# Patient Record
Sex: FEMALE | Race: BLACK OR AFRICAN AMERICAN | Employment: FULL TIME | URBAN - METROPOLITAN AREA
[De-identification: names, ages, dates, MRNs, and addresses within clinical notes are randomized per-mention and may not be internally consistent; named-entity substitution may affect disease eponyms.]

---

## 2017-02-06 ENCOUNTER — TELEPHONE (OUTPATIENT)
Dept: ENDOCRINOLOGY | Age: 62
End: 2017-02-06

## 2017-02-10 ENCOUNTER — TELEPHONE (OUTPATIENT)
Dept: ENDOCRINOLOGY | Age: 62
End: 2017-02-10

## 2017-02-13 ENCOUNTER — TELEPHONE (OUTPATIENT)
Dept: ENDOCRINOLOGY | Age: 62
End: 2017-02-13

## 2017-02-20 ENCOUNTER — TELEPHONE (OUTPATIENT)
Dept: INTERNAL MEDICINE | Age: 62
End: 2017-02-20

## 2017-02-20 RX ORDER — FLUOXETINE HYDROCHLORIDE 20 MG/1
20 CAPSULE ORAL DAILY
Qty: 90 CAPSULE | Refills: 0 | Status: SHIPPED | OUTPATIENT
Start: 2017-02-20 | End: 2017-08-22 | Stop reason: SDUPTHER

## 2017-02-20 RX ORDER — FLUOXETINE HYDROCHLORIDE 20 MG/1
20 CAPSULE ORAL DAILY
Qty: 90 CAPSULE | Refills: 1 | Status: CANCELLED | OUTPATIENT
Start: 2017-02-20

## 2017-05-15 ENCOUNTER — OFFICE VISIT (OUTPATIENT)
Dept: ENDOCRINOLOGY | Age: 62
End: 2017-05-15

## 2017-05-15 VITALS
HEART RATE: 106 BPM | WEIGHT: 293 LBS | OXYGEN SATURATION: 95 % | DIASTOLIC BLOOD PRESSURE: 83 MMHG | RESPIRATION RATE: 16 BRPM | SYSTOLIC BLOOD PRESSURE: 164 MMHG | HEIGHT: 67 IN | BODY MASS INDEX: 45.99 KG/M2

## 2017-05-15 DIAGNOSIS — I10 ESSENTIAL HYPERTENSION: ICD-10-CM

## 2017-05-15 DIAGNOSIS — E11.8 TYPE 2 DIABETES MELLITUS WITH COMPLICATION, WITH LONG-TERM CURRENT USE OF INSULIN (HCC): Primary | ICD-10-CM

## 2017-05-15 DIAGNOSIS — Z79.4 TYPE 2 DIABETES MELLITUS WITH COMPLICATION, WITH LONG-TERM CURRENT USE OF INSULIN (HCC): Primary | ICD-10-CM

## 2017-05-15 LAB — HBA1C MFR BLD: 10.2 %

## 2017-05-15 PROCEDURE — 99214 OFFICE O/P EST MOD 30 MIN: CPT | Performed by: INTERNAL MEDICINE

## 2017-05-15 PROCEDURE — 83036 HEMOGLOBIN GLYCOSYLATED A1C: CPT | Performed by: INTERNAL MEDICINE

## 2017-05-15 RX ORDER — HYDROCHLOROTHIAZIDE 25 MG/1
25 TABLET ORAL DAILY
Qty: 90 TABLET | Refills: 3 | Status: SHIPPED | OUTPATIENT
Start: 2017-05-15 | End: 2018-03-05

## 2017-05-16 ENCOUNTER — TELEPHONE (OUTPATIENT)
Dept: BARIATRICS/WEIGHT MGMT | Age: 62
End: 2017-05-16

## 2017-05-23 ENCOUNTER — TELEPHONE (OUTPATIENT)
Dept: ENDOCRINOLOGY | Age: 62
End: 2017-05-23

## 2017-05-25 ENCOUNTER — TELEPHONE (OUTPATIENT)
Dept: ENDOCRINOLOGY | Age: 62
End: 2017-05-25

## 2017-05-30 ENCOUNTER — TELEPHONE (OUTPATIENT)
Dept: ENDOCRINOLOGY | Age: 62
End: 2017-05-30

## 2017-06-21 ENCOUNTER — TELEPHONE (OUTPATIENT)
Dept: INTERNAL MEDICINE | Age: 62
End: 2017-06-21

## 2017-06-21 DIAGNOSIS — Z79.4 TYPE 2 DIABETES MELLITUS WITH COMPLICATION, WITH LONG-TERM CURRENT USE OF INSULIN (HCC): ICD-10-CM

## 2017-06-21 DIAGNOSIS — E11.8 TYPE 2 DIABETES MELLITUS WITH COMPLICATION, WITH LONG-TERM CURRENT USE OF INSULIN (HCC): ICD-10-CM

## 2017-06-21 DIAGNOSIS — I10 ESSENTIAL HYPERTENSION: ICD-10-CM

## 2017-06-21 RX ORDER — PROMETHAZINE HYDROCHLORIDE AND CODEINE PHOSPHATE 6.25; 1 MG/5ML; MG/5ML
5 SYRUP ORAL EVERY 4 HOURS PRN
Qty: 240 ML | Refills: 1 | OUTPATIENT
Start: 2017-06-21 | End: 2017-08-22 | Stop reason: ALTCHOICE

## 2017-08-22 ENCOUNTER — OFFICE VISIT (OUTPATIENT)
Dept: ENDOCRINOLOGY | Age: 62
End: 2017-08-22

## 2017-08-22 VITALS
DIASTOLIC BLOOD PRESSURE: 80 MMHG | RESPIRATION RATE: 16 BRPM | WEIGHT: 293 LBS | BODY MASS INDEX: 45.99 KG/M2 | SYSTOLIC BLOOD PRESSURE: 138 MMHG | HEIGHT: 67 IN

## 2017-08-22 DIAGNOSIS — E11.8 TYPE 2 DIABETES MELLITUS WITH COMPLICATION, WITH LONG-TERM CURRENT USE OF INSULIN (HCC): Primary | ICD-10-CM

## 2017-08-22 DIAGNOSIS — E04.2 MULTIPLE THYROID NODULES: ICD-10-CM

## 2017-08-22 DIAGNOSIS — Z79.4 TYPE 2 DIABETES MELLITUS WITH COMPLICATION, WITH LONG-TERM CURRENT USE OF INSULIN (HCC): Primary | ICD-10-CM

## 2017-08-22 DIAGNOSIS — I10 ESSENTIAL HYPERTENSION: ICD-10-CM

## 2017-08-22 LAB — HBA1C MFR BLD: 9.2 %

## 2017-08-22 PROCEDURE — 99214 OFFICE O/P EST MOD 30 MIN: CPT | Performed by: INTERNAL MEDICINE

## 2017-08-22 PROCEDURE — 83036 HEMOGLOBIN GLYCOSYLATED A1C: CPT | Performed by: INTERNAL MEDICINE

## 2017-08-22 RX ORDER — FLUOXETINE HYDROCHLORIDE 20 MG/1
CAPSULE ORAL
Qty: 15 CAPSULE | Refills: 0 | Status: SHIPPED | OUTPATIENT
Start: 2017-08-22 | End: 2017-09-07 | Stop reason: SDUPTHER

## 2017-09-07 ENCOUNTER — OFFICE VISIT (OUTPATIENT)
Dept: INTERNAL MEDICINE | Age: 62
End: 2017-09-07

## 2017-09-07 ENCOUNTER — TELEPHONE (OUTPATIENT)
Dept: INTERNAL MEDICINE | Age: 62
End: 2017-09-07

## 2017-09-07 VITALS
SYSTOLIC BLOOD PRESSURE: 188 MMHG | WEIGHT: 293 LBS | BODY MASS INDEX: 45.99 KG/M2 | DIASTOLIC BLOOD PRESSURE: 87 MMHG | HEIGHT: 67 IN

## 2017-09-07 DIAGNOSIS — R60.1 GENERALIZED EDEMA: ICD-10-CM

## 2017-09-07 DIAGNOSIS — K21.9 GASTROESOPHAGEAL REFLUX DISEASE WITHOUT ESOPHAGITIS: ICD-10-CM

## 2017-09-07 DIAGNOSIS — E11.8 TYPE 2 DIABETES MELLITUS WITH COMPLICATION, WITH LONG-TERM CURRENT USE OF INSULIN (HCC): ICD-10-CM

## 2017-09-07 DIAGNOSIS — G47.33 OBSTRUCTIVE SLEEP APNEA SYNDROME: ICD-10-CM

## 2017-09-07 DIAGNOSIS — I10 ESSENTIAL HYPERTENSION: ICD-10-CM

## 2017-09-07 DIAGNOSIS — E78.00 PURE HYPERCHOLESTEROLEMIA: ICD-10-CM

## 2017-09-07 DIAGNOSIS — E66.01 MORBID OBESITY DUE TO EXCESS CALORIES (HCC): ICD-10-CM

## 2017-09-07 DIAGNOSIS — F32.A DEPRESSION, UNSPECIFIED DEPRESSION TYPE: ICD-10-CM

## 2017-09-07 DIAGNOSIS — Z00.00 WELL ADULT EXAM: Primary | ICD-10-CM

## 2017-09-07 DIAGNOSIS — Z79.4 TYPE 2 DIABETES MELLITUS WITH COMPLICATION, WITH LONG-TERM CURRENT USE OF INSULIN (HCC): ICD-10-CM

## 2017-09-07 DIAGNOSIS — K63.5 POLYP OF COLON, UNSPECIFIED PART OF COLON, UNSPECIFIED TYPE: ICD-10-CM

## 2017-09-07 DIAGNOSIS — F51.01 PRIMARY INSOMNIA: ICD-10-CM

## 2017-09-07 PROCEDURE — 99396 PREV VISIT EST AGE 40-64: CPT | Performed by: INTERNAL MEDICINE

## 2017-09-07 RX ORDER — FLUOXETINE HYDROCHLORIDE 20 MG/1
CAPSULE ORAL
Qty: 90 CAPSULE | Refills: 3 | Status: SHIPPED | OUTPATIENT
Start: 2017-09-07 | End: 2018-09-29 | Stop reason: SDUPTHER

## 2017-09-07 RX ORDER — VERAPAMIL HYDROCHLORIDE 240 MG/1
240 TABLET, FILM COATED, EXTENDED RELEASE ORAL NIGHTLY
Qty: 90 TABLET | Refills: 3 | Status: SHIPPED | OUTPATIENT
Start: 2017-09-07 | End: 2017-12-13 | Stop reason: SDUPTHER

## 2017-09-07 RX ORDER — GABAPENTIN 100 MG/1
100 CAPSULE ORAL 4 TIMES DAILY
Qty: 120 CAPSULE | Refills: 5 | Status: SHIPPED | OUTPATIENT
Start: 2017-09-07 | End: 2019-01-28 | Stop reason: SDUPTHER

## 2017-09-07 ASSESSMENT — ENCOUNTER SYMPTOMS
BACK PAIN: 0
ABDOMINAL PAIN: 0
CHEST TIGHTNESS: 0
WHEEZING: 0
DIARRHEA: 0
COLOR CHANGE: 0

## 2017-09-16 ENCOUNTER — HOSPITAL ENCOUNTER (OUTPATIENT)
Dept: MAMMOGRAPHY | Age: 62
Discharge: HOME | End: 2017-09-16
Attending: OBSTETRICS & GYNECOLOGY | Admitting: OBSTETRICS & GYNECOLOGY

## 2017-09-16 DIAGNOSIS — Z12.31 VISIT FOR SCREENING MAMMOGRAM: ICD-10-CM

## 2017-09-21 DIAGNOSIS — F51.01 PRIMARY INSOMNIA: ICD-10-CM

## 2017-09-21 DIAGNOSIS — E11.8 TYPE 2 DIABETES MELLITUS WITH COMPLICATION, WITH LONG-TERM CURRENT USE OF INSULIN (HCC): ICD-10-CM

## 2017-09-21 DIAGNOSIS — Z79.4 TYPE 2 DIABETES MELLITUS WITH COMPLICATION, WITH LONG-TERM CURRENT USE OF INSULIN (HCC): ICD-10-CM

## 2017-09-21 DIAGNOSIS — E78.00 PURE HYPERCHOLESTEROLEMIA: ICD-10-CM

## 2017-09-21 DIAGNOSIS — I10 ESSENTIAL HYPERTENSION: ICD-10-CM

## 2017-09-21 DIAGNOSIS — Z00.00 WELL ADULT EXAM: ICD-10-CM

## 2017-09-21 LAB
A/G RATIO: 1.1 (ref 1.1–2.2)
ALBUMIN SERPL-MCNC: 3.7 G/DL (ref 3.4–5)
ALP BLD-CCNC: 123 U/L (ref 40–129)
ALT SERPL-CCNC: 11 U/L (ref 10–40)
ANION GAP SERPL CALCULATED.3IONS-SCNC: 15 MMOL/L (ref 3–16)
AST SERPL-CCNC: 8 U/L (ref 15–37)
BILIRUB SERPL-MCNC: 0.4 MG/DL (ref 0–1)
BUN BLDV-MCNC: 10 MG/DL (ref 7–20)
CALCIUM SERPL-MCNC: 9.4 MG/DL (ref 8.3–10.6)
CHLORIDE BLD-SCNC: 98 MMOL/L (ref 99–110)
CHOLESTEROL, TOTAL: 129 MG/DL (ref 0–199)
CO2: 27 MMOL/L (ref 21–32)
CREAT SERPL-MCNC: 0.6 MG/DL (ref 0.6–1.2)
CREATININE URINE: 106.7 MG/DL (ref 28–259)
GFR AFRICAN AMERICAN: >60
GFR NON-AFRICAN AMERICAN: >60
GLOBULIN: 3.4 G/DL
GLUCOSE BLD-MCNC: 208 MG/DL (ref 70–99)
HDLC SERPL-MCNC: 66 MG/DL (ref 40–60)
HEPATITIS C ANTIBODY INTERPRETATION: NORMAL
LDL CHOLESTEROL CALCULATED: 51 MG/DL
MICROALBUMIN UR-MCNC: <1.2 MG/DL
MICROALBUMIN/CREAT UR-RTO: NORMAL MG/G (ref 0–30)
POTASSIUM SERPL-SCNC: 4.3 MMOL/L (ref 3.5–5.1)
SODIUM BLD-SCNC: 140 MMOL/L (ref 136–145)
TOTAL PROTEIN: 7.1 G/DL (ref 6.4–8.2)
TRIGL SERPL-MCNC: 60 MG/DL (ref 0–150)
TSH SERPL DL<=0.05 MIU/L-ACNC: 1.47 UIU/ML (ref 0.27–4.2)
VITAMIN D 25-HYDROXY: 25.7 NG/ML
VLDLC SERPL CALC-MCNC: 12 MG/DL

## 2017-10-23 RX ORDER — INSULIN GLARGINE 100 [IU]/ML
INJECTION, SOLUTION SUBCUTANEOUS
Qty: 135 ML | Refills: 3 | Status: SHIPPED | OUTPATIENT
Start: 2017-10-23 | End: 2018-03-15 | Stop reason: SDUPTHER

## 2017-11-29 ENCOUNTER — NURSE ONLY (OUTPATIENT)
Dept: INTERNAL MEDICINE | Age: 62
End: 2017-11-29

## 2017-11-29 DIAGNOSIS — Z23 NEED FOR INFLUENZA VACCINATION: Primary | ICD-10-CM

## 2017-11-29 PROCEDURE — 90630 INFLUENZA, QUADV, 18-64 YRS, ID, PF, MICRO INJ, 0.1ML (FLUZONE QUADV, PF): CPT | Performed by: INTERNAL MEDICINE

## 2017-11-29 PROCEDURE — 90471 IMMUNIZATION ADMIN: CPT | Performed by: INTERNAL MEDICINE

## 2017-12-12 DIAGNOSIS — E11.8 TYPE 2 DIABETES MELLITUS WITH COMPLICATION, WITH LONG-TERM CURRENT USE OF INSULIN (HCC): ICD-10-CM

## 2017-12-12 DIAGNOSIS — F51.01 PRIMARY INSOMNIA: ICD-10-CM

## 2017-12-12 DIAGNOSIS — K21.9 GASTROESOPHAGEAL REFLUX DISEASE WITHOUT ESOPHAGITIS: ICD-10-CM

## 2017-12-12 DIAGNOSIS — R60.1 GENERALIZED EDEMA: ICD-10-CM

## 2017-12-12 DIAGNOSIS — Z79.4 TYPE 2 DIABETES MELLITUS WITH COMPLICATION, WITH LONG-TERM CURRENT USE OF INSULIN (HCC): ICD-10-CM

## 2017-12-12 DIAGNOSIS — I10 ESSENTIAL HYPERTENSION: ICD-10-CM

## 2017-12-12 DIAGNOSIS — G47.33 OBSTRUCTIVE SLEEP APNEA SYNDROME: ICD-10-CM

## 2017-12-13 RX ORDER — VERAPAMIL HYDROCHLORIDE 240 MG/1
240 TABLET, FILM COATED, EXTENDED RELEASE ORAL NIGHTLY
Qty: 90 TABLET | Refills: 3 | Status: SHIPPED | OUTPATIENT
Start: 2017-12-13 | End: 2018-02-08 | Stop reason: SDUPTHER

## 2017-12-14 RX ORDER — SIMVASTATIN 40 MG
TABLET ORAL
Qty: 90 TABLET | Refills: 4 | Status: SHIPPED | OUTPATIENT
Start: 2017-12-14 | End: 2018-11-12

## 2017-12-14 RX ORDER — LOSARTAN POTASSIUM 100 MG/1
TABLET ORAL
Qty: 90 TABLET | Refills: 4 | Status: SHIPPED | OUTPATIENT
Start: 2017-12-14 | End: 2019-02-04 | Stop reason: SDUPTHER

## 2018-01-19 ENCOUNTER — TELEPHONE (OUTPATIENT)
Dept: INTERNAL MEDICINE | Age: 63
End: 2018-01-19

## 2018-01-29 ENCOUNTER — OFFICE VISIT (OUTPATIENT)
Dept: ENDOCRINOLOGY | Age: 63
End: 2018-01-29

## 2018-01-29 VITALS
RESPIRATION RATE: 16 BRPM | HEART RATE: 68 BPM | SYSTOLIC BLOOD PRESSURE: 138 MMHG | BODY MASS INDEX: 45.99 KG/M2 | WEIGHT: 293 LBS | DIASTOLIC BLOOD PRESSURE: 78 MMHG | HEIGHT: 67 IN

## 2018-01-29 DIAGNOSIS — I10 ESSENTIAL HYPERTENSION: ICD-10-CM

## 2018-01-29 DIAGNOSIS — E11.8 TYPE 2 DIABETES MELLITUS WITH COMPLICATION, WITH LONG-TERM CURRENT USE OF INSULIN (HCC): Primary | ICD-10-CM

## 2018-01-29 DIAGNOSIS — Z79.4 TYPE 2 DIABETES MELLITUS WITH COMPLICATION, WITH LONG-TERM CURRENT USE OF INSULIN (HCC): Primary | ICD-10-CM

## 2018-01-29 LAB — HBA1C MFR BLD: 9.2 %

## 2018-01-29 PROCEDURE — 83036 HEMOGLOBIN GLYCOSYLATED A1C: CPT | Performed by: INTERNAL MEDICINE

## 2018-01-29 PROCEDURE — 99214 OFFICE O/P EST MOD 30 MIN: CPT | Performed by: INTERNAL MEDICINE

## 2018-01-29 RX ORDER — FLASH GLUCOSE SENSOR
KIT MISCELLANEOUS
Qty: 3 EACH | Refills: 2 | Status: SHIPPED | OUTPATIENT
Start: 2018-01-29 | End: 2019-07-22

## 2018-01-29 RX ORDER — FLASH GLUCOSE SENSOR
KIT MISCELLANEOUS
Qty: 1 DEVICE | Refills: 0 | Status: SHIPPED | OUTPATIENT
Start: 2018-01-29 | End: 2019-07-22

## 2018-01-29 NOTE — PROGRESS NOTES
Seen as f/u patient for diabetes    Interim; Seen after 5 months    Diagnosed with Type 2 diabetes mellitus 8 years ago  Known diabetic complications: Retinopathy NPDR    Current diabetic medications   Lantus /66    But missing second shot 3 days a week  Humalog 34 units AC TID, taking it 4/week   SSI 2 for 50>150  She has relative hypoglycemia in the 70s    H/o use of lantus, novolog, victoza, januvia    Metformin and ER caused GI symptoms    Last A1c 9.2%<------10.2 on <--- 8.4 on <-----7.8 on <------ 9.6 on <---- 10.5 on 10/15<-----11.8 on 8/15<---14.2 <--- 10.6 <--- 9.4    Prior visit with dietician: Yes   Current diet: on average, 3 meals per day does not follow low CHO  Current exercise: walking   Current monitoring regimen: home blood tests -occ    Has brought blood glucose log/meter: no  Home blood sugar records: -  Any episodes of hypoglycemia? no    No Hx of CAD , PVD, CVA    Hyperlipidemia:  on 8/15   Simvastatin 40mg  Last eye exam:   Last foot exam:   Last microalbumin to creatinine ratio:  ACE-I or ARB: verapamil  180mg  Losartan 100mg HCTZ 12.5mg    Past Medical History:   Diagnosis Date    Anxiety and depression     Asthma     Edema     GERD (gastroesophageal reflux disease)     Hyperlipidemia     Hypertension     IBS (irritable bowel syndrome)     Morbid obesity ([address]     Multinodular goiter 2010    Personal history of colonic polyps     Dr. Henriquez Dy- due for repeat colonoscopy     Positive PPD, treated     INH completed 2001    S/P cholecystectomy 2011    Type II or unspecified type diabetes mellitus without mention of complication, not stated as uncontrolled      Past Surgical History:   Procedure Laterality Date     SECTION N/A 30 plus years ago    x2    CHOLECYSTECTOMY      3/2009    COLONOSCOPY      2012 Dr. Cam Arreola 5 yrs    COLONOSCOPY      repeat x 3 yrs HAD 5 POLPYS    HYSTEROSCOPY  2015 HYSTEROSCOPY, DILATATION AND CURETTAGE WITH NOVASURE ABLATION AND ABLATION    MYOMECTOMY      6/2003    TIBIA FRACTURE SURGERY      8/2006 w/ revision     Current Outpatient Prescriptions   Medication Sig Dispense Refill    simvastatin (ZOCOR) 40 MG tablet TAKE 1 TABLET NIGHTLY 90 tablet 4    losartan (COZAAR) 100 MG tablet TAKE 1 TABLET DAILY 90 tablet 4    verapamil (CALAN SR) 240 MG extended release tablet Take 1 tablet by mouth nightly 90 tablet 3    LANTUS SOLOSTAR 100 UNIT/ML injection pen INJECT 66 UNITS TWICE A  mL 3    insulin lispro (HUMALOG) 100 UNIT/ML injection vial 34 units with meals 9 vial 3    gabapentin (NEURONTIN) 100 MG capsule Take 1 capsule by mouth 4 times daily 120 capsule 5    FLUoxetine (PROZAC) 20 MG capsule TAKE 1 CAPSULE DAILY 90 capsule 3    hydrochlorothiazide (HYDRODIURIL) 25 MG tablet Take 1 tablet by mouth daily 90 tablet 3    loratadine (CLARITIN) 10 MG tablet Take 10 mg by mouth daily      ONE TOUCH LANCETS MISC 1 each by Does not apply route daily 100 each 3    Insulin Syringe-Needle U-100 (B-D INS SYR MICROFINE .3CC/28G) 28G X 1/2\" 0.3 ML MISC 1 applicator by Does not apply route 2 times daily Dx: 250.00 200 each 6    NORETHINDRONE PO Take 10 mg by mouth nightly       No current facility-administered medications for this visit.         Review of Systems  scanned       Objective:      /78 (Site: Right Arm, Position: Sitting, Cuff Size: Large Adult)   Pulse 68   Resp 16   Ht 5' 6.5\" (1.689 m)   Wt (!) 343 lb 9.6 oz (155.9 kg)   BMI 54.63 kg/m²   Wt Readings from Last 3 Encounters:   01/29/18 (!) 343 lb 9.6 oz (155.9 kg)   09/07/17 (!) 336 lb (152.4 kg)   08/22/17 (!) 334 lb (151.5 kg)     Constitutional: Well-developed, alert, appears stated age, cooperative, in no acute distress  H/E/N/M/T:atraumatic, normocephalic, external ears, nose, lips normal without lesions  Eyes: extraocular muscles are intact  Skin: Xanthoma/Xanthelasmas are  not -Hyperlipidemia, LDL goal is <100 mg/dl   -Hypertension; Continue same  -Daily ASA: Added 81mg   -Smoking status: Non smoker

## 2018-02-08 RX ORDER — VERAPAMIL HYDROCHLORIDE 240 MG/1
240 TABLET, FILM COATED, EXTENDED RELEASE ORAL NIGHTLY
Qty: 90 TABLET | Refills: 3 | Status: SHIPPED | OUTPATIENT
Start: 2018-02-08 | End: 2018-05-21 | Stop reason: SDUPTHER

## 2018-03-02 ENCOUNTER — TELEPHONE (OUTPATIENT)
Dept: ENDOCRINOLOGY | Age: 63
End: 2018-03-02

## 2018-03-05 ENCOUNTER — OFFICE VISIT (OUTPATIENT)
Dept: INTERNAL MEDICINE | Age: 63
End: 2018-03-05

## 2018-03-05 DIAGNOSIS — M54.50 CHRONIC BILATERAL LOW BACK PAIN WITHOUT SCIATICA: ICD-10-CM

## 2018-03-05 DIAGNOSIS — M25.561 CHRONIC PAIN OF BOTH KNEES: ICD-10-CM

## 2018-03-05 DIAGNOSIS — Z79.4 TYPE 2 DIABETES MELLITUS WITH COMPLICATION, WITH LONG-TERM CURRENT USE OF INSULIN (HCC): ICD-10-CM

## 2018-03-05 DIAGNOSIS — I87.2 VENOUS STASIS DERMATITIS OF BOTH LOWER EXTREMITIES: ICD-10-CM

## 2018-03-05 DIAGNOSIS — G56.03 BILATERAL CARPAL TUNNEL SYNDROME: Primary | ICD-10-CM

## 2018-03-05 DIAGNOSIS — E11.8 TYPE 2 DIABETES MELLITUS WITH COMPLICATION, WITH LONG-TERM CURRENT USE OF INSULIN (HCC): ICD-10-CM

## 2018-03-05 DIAGNOSIS — M25.572 CHRONIC PAIN OF BOTH ANKLES: ICD-10-CM

## 2018-03-05 DIAGNOSIS — G89.29 CHRONIC PAIN OF BOTH ANKLES: ICD-10-CM

## 2018-03-05 DIAGNOSIS — R35.0 URINARY FREQUENCY: ICD-10-CM

## 2018-03-05 DIAGNOSIS — M19.90 ARTHRITIS: ICD-10-CM

## 2018-03-05 DIAGNOSIS — G89.29 CHRONIC PAIN OF BOTH KNEES: ICD-10-CM

## 2018-03-05 DIAGNOSIS — I10 ESSENTIAL HYPERTENSION: ICD-10-CM

## 2018-03-05 DIAGNOSIS — M25.571 CHRONIC PAIN OF BOTH ANKLES: ICD-10-CM

## 2018-03-05 DIAGNOSIS — G89.29 CHRONIC BILATERAL LOW BACK PAIN WITHOUT SCIATICA: ICD-10-CM

## 2018-03-05 DIAGNOSIS — M25.562 CHRONIC PAIN OF BOTH KNEES: ICD-10-CM

## 2018-03-05 PROCEDURE — 99214 OFFICE O/P EST MOD 30 MIN: CPT | Performed by: INTERNAL MEDICINE

## 2018-03-05 RX ORDER — BETAMETHASONE DIPROPIONATE 0.05 %
OINTMENT (GRAM) TOPICAL
Qty: 1 TUBE | Refills: 3 | Status: SHIPPED | OUTPATIENT
Start: 2018-03-05 | End: 2018-05-21 | Stop reason: SDUPTHER

## 2018-03-05 RX ORDER — FUROSEMIDE 20 MG/1
20 TABLET ORAL DAILY
Qty: 30 TABLET | Refills: 3 | Status: SHIPPED | OUTPATIENT
Start: 2018-03-05 | End: 2018-05-21 | Stop reason: SDUPTHER

## 2018-03-05 RX ORDER — OXYBUTYNIN CHLORIDE 10 MG/1
10 TABLET, EXTENDED RELEASE ORAL DAILY
Qty: 30 TABLET | Refills: 3 | Status: SHIPPED | OUTPATIENT
Start: 2018-03-05 | End: 2018-09-29

## 2018-03-05 ASSESSMENT — ENCOUNTER SYMPTOMS
BACK PAIN: 1
CHEST TIGHTNESS: 0
WHEEZING: 0
ABDOMINAL PAIN: 0
COLOR CHANGE: 1

## 2018-03-05 ASSESSMENT — PATIENT HEALTH QUESTIONNAIRE - PHQ9
2. FEELING DOWN, DEPRESSED OR HOPELESS: 0
SUM OF ALL RESPONSES TO PHQ QUESTIONS 1-9: 0
1. LITTLE INTEREST OR PLEASURE IN DOING THINGS: 0
SUM OF ALL RESPONSES TO PHQ9 QUESTIONS 1 & 2: 0

## 2018-03-08 ENCOUNTER — TELEPHONE (OUTPATIENT)
Dept: INTERNAL MEDICINE | Age: 63
End: 2018-03-08

## 2018-03-19 ENCOUNTER — TELEPHONE (OUTPATIENT)
Dept: ENDOCRINOLOGY | Age: 63
End: 2018-03-19

## 2018-03-19 NOTE — TELEPHONE ENCOUNTER
Pt called to have her BD Sterile Needles Microfine . 3cc/28G for her Lantus Pen called to [address] please.

## 2018-03-22 ENCOUNTER — HOSPITAL ENCOUNTER (OUTPATIENT)
Dept: PHYSICAL THERAPY | Age: 63
Discharge: HOME | End: 2018-03-31
Attending: INTERNAL MEDICINE | Admitting: INTERNAL MEDICINE

## 2018-04-01 ENCOUNTER — HOSPITAL ENCOUNTER (OUTPATIENT)
Dept: OTHER | Age: 63
Discharge: HOME | End: 2018-04-30
Attending: INTERNAL MEDICINE | Admitting: INTERNAL MEDICINE

## 2018-04-18 ENCOUNTER — TELEPHONE (OUTPATIENT)
Dept: ORTHOPEDIC SURGERY | Age: 63
End: 2018-04-18

## 2018-04-18 ENCOUNTER — OFFICE VISIT (OUTPATIENT)
Dept: ORTHOPEDIC SURGERY | Age: 63
End: 2018-04-18

## 2018-04-18 VITALS — HEIGHT: 67 IN | WEIGHT: 293 LBS | BODY MASS INDEX: 45.99 KG/M2 | RESPIRATION RATE: 16 BRPM

## 2018-04-18 DIAGNOSIS — R20.0 BILATERAL HAND NUMBNESS: Primary | ICD-10-CM

## 2018-04-18 PROCEDURE — 99243 OFF/OP CNSLTJ NEW/EST LOW 30: CPT | Performed by: ORTHOPAEDIC SURGERY

## 2018-05-01 ENCOUNTER — HOSPITAL ENCOUNTER (OUTPATIENT)
Dept: OTHER | Age: 63
Discharge: HOME | End: 2018-05-31
Attending: INTERNAL MEDICINE | Admitting: INTERNAL MEDICINE

## 2018-05-21 ENCOUNTER — OFFICE VISIT (OUTPATIENT)
Dept: INTERNAL MEDICINE | Age: 63
End: 2018-05-21

## 2018-05-21 VITALS
SYSTOLIC BLOOD PRESSURE: 120 MMHG | HEIGHT: 66 IN | BODY MASS INDEX: 47.09 KG/M2 | WEIGHT: 293 LBS | DIASTOLIC BLOOD PRESSURE: 76 MMHG

## 2018-05-21 DIAGNOSIS — M25.572 CHRONIC PAIN OF BOTH ANKLES: ICD-10-CM

## 2018-05-21 DIAGNOSIS — M54.50 CHRONIC BILATERAL LOW BACK PAIN WITHOUT SCIATICA: ICD-10-CM

## 2018-05-21 DIAGNOSIS — Z79.4 TYPE 2 DIABETES MELLITUS WITH COMPLICATION, WITH LONG-TERM CURRENT USE OF INSULIN (HCC): ICD-10-CM

## 2018-05-21 DIAGNOSIS — M25.571 CHRONIC PAIN OF BOTH ANKLES: ICD-10-CM

## 2018-05-21 DIAGNOSIS — M25.561 CHRONIC PAIN OF BOTH KNEES: ICD-10-CM

## 2018-05-21 DIAGNOSIS — G89.29 CHRONIC BILATERAL LOW BACK PAIN WITHOUT SCIATICA: ICD-10-CM

## 2018-05-21 DIAGNOSIS — E11.8 TYPE 2 DIABETES MELLITUS WITH COMPLICATION, WITH LONG-TERM CURRENT USE OF INSULIN (HCC): ICD-10-CM

## 2018-05-21 DIAGNOSIS — M25.562 CHRONIC PAIN OF BOTH KNEES: ICD-10-CM

## 2018-05-21 DIAGNOSIS — G89.29 CHRONIC PAIN OF BOTH ANKLES: ICD-10-CM

## 2018-05-21 DIAGNOSIS — R60.1 GENERALIZED EDEMA: ICD-10-CM

## 2018-05-21 DIAGNOSIS — I87.2 VENOUS STASIS DERMATITIS OF BOTH LOWER EXTREMITIES: ICD-10-CM

## 2018-05-21 DIAGNOSIS — I10 ESSENTIAL HYPERTENSION: ICD-10-CM

## 2018-05-21 DIAGNOSIS — G89.29 CHRONIC PAIN OF BOTH KNEES: ICD-10-CM

## 2018-05-21 PROCEDURE — 99214 OFFICE O/P EST MOD 30 MIN: CPT | Performed by: INTERNAL MEDICINE

## 2018-05-21 RX ORDER — FUROSEMIDE 40 MG/1
40 TABLET ORAL DAILY
Qty: 10 TABLET | Refills: 3 | Status: SHIPPED | OUTPATIENT
Start: 2018-05-21 | End: 2018-09-29

## 2018-05-21 RX ORDER — POTASSIUM CHLORIDE 750 MG/1
10 TABLET, EXTENDED RELEASE ORAL DAILY
Qty: 10 TABLET | Refills: 3 | Status: SHIPPED | OUTPATIENT
Start: 2018-05-21 | End: 2018-09-29

## 2018-05-21 RX ORDER — BETAMETHASONE DIPROPIONATE 0.05 %
OINTMENT (GRAM) TOPICAL
Qty: 3 TUBE | Refills: 3 | Status: SHIPPED | OUTPATIENT
Start: 2018-05-21 | End: 2018-05-21 | Stop reason: SDUPTHER

## 2018-05-21 RX ORDER — FUROSEMIDE 40 MG/1
40 TABLET ORAL DAILY
Qty: 90 TABLET | Refills: 3 | Status: SHIPPED | OUTPATIENT
Start: 2018-05-21 | End: 2018-05-21 | Stop reason: SDUPTHER

## 2018-05-21 RX ORDER — BETAMETHASONE DIPROPIONATE 0.05 %
OINTMENT (GRAM) TOPICAL
Qty: 1 TUBE | Refills: 3 | Status: SHIPPED | OUTPATIENT
Start: 2018-05-21 | End: 2018-09-29 | Stop reason: ALTCHOICE

## 2018-05-21 RX ORDER — POTASSIUM CHLORIDE 750 MG/1
10 TABLET, EXTENDED RELEASE ORAL DAILY
Qty: 90 TABLET | Refills: 3 | Status: SHIPPED | OUTPATIENT
Start: 2018-05-21 | End: 2018-05-21 | Stop reason: SDUPTHER

## 2018-05-21 RX ORDER — VERAPAMIL HYDROCHLORIDE 240 MG/1
240 TABLET, FILM COATED, EXTENDED RELEASE ORAL NIGHTLY
Qty: 90 TABLET | Refills: 3 | Status: SHIPPED | OUTPATIENT
Start: 2018-05-21 | End: 2019-07-11 | Stop reason: SDUPTHER

## 2018-05-21 ASSESSMENT — ENCOUNTER SYMPTOMS
WHEEZING: 0
CHEST TIGHTNESS: 0
BACK PAIN: 0
ABDOMINAL PAIN: 0
COLOR CHANGE: 1

## 2018-06-19 DIAGNOSIS — M25.572 CHRONIC PAIN OF BOTH ANKLES: ICD-10-CM

## 2018-06-19 DIAGNOSIS — M25.571 CHRONIC PAIN OF BOTH ANKLES: ICD-10-CM

## 2018-06-19 DIAGNOSIS — M25.562 CHRONIC PAIN OF BOTH KNEES: ICD-10-CM

## 2018-06-19 DIAGNOSIS — I87.2 VENOUS STASIS DERMATITIS OF BOTH LOWER EXTREMITIES: ICD-10-CM

## 2018-06-19 DIAGNOSIS — M25.561 CHRONIC PAIN OF BOTH KNEES: ICD-10-CM

## 2018-06-19 DIAGNOSIS — E11.8 TYPE 2 DIABETES MELLITUS WITH COMPLICATION, WITH LONG-TERM CURRENT USE OF INSULIN (HCC): ICD-10-CM

## 2018-06-19 DIAGNOSIS — Z79.4 TYPE 2 DIABETES MELLITUS WITH COMPLICATION, WITH LONG-TERM CURRENT USE OF INSULIN (HCC): ICD-10-CM

## 2018-06-19 DIAGNOSIS — G89.29 CHRONIC PAIN OF BOTH KNEES: ICD-10-CM

## 2018-06-19 DIAGNOSIS — I10 ESSENTIAL HYPERTENSION: ICD-10-CM

## 2018-06-19 DIAGNOSIS — G89.29 CHRONIC PAIN OF BOTH ANKLES: ICD-10-CM

## 2018-06-20 LAB
ALBUMIN SERPL-MCNC: 3.9 G/DL (ref 3.4–5)
ANION GAP SERPL CALCULATED.3IONS-SCNC: 16 MMOL/L (ref 3–16)
BASOPHILS ABSOLUTE: 0 K/UL (ref 0–0.2)
BASOPHILS RELATIVE PERCENT: 0.2 %
BUN BLDV-MCNC: 12 MG/DL (ref 7–20)
CALCIUM SERPL-MCNC: 9.5 MG/DL (ref 8.3–10.6)
CHLORIDE BLD-SCNC: 97 MMOL/L (ref 99–110)
CO2: 28 MMOL/L (ref 21–32)
CREAT SERPL-MCNC: 0.7 MG/DL (ref 0.6–1.2)
EOSINOPHILS ABSOLUTE: 0.1 K/UL (ref 0–0.6)
EOSINOPHILS RELATIVE PERCENT: 1.1 %
GFR AFRICAN AMERICAN: >60
GFR NON-AFRICAN AMERICAN: >60
GLUCOSE BLD-MCNC: 194 MG/DL (ref 70–99)
HCT VFR BLD CALC: 40.3 % (ref 36–48)
HEMOGLOBIN: 13.4 G/DL (ref 12–16)
LYMPHOCYTES ABSOLUTE: 3.2 K/UL (ref 1–5.1)
LYMPHOCYTES RELATIVE PERCENT: 32.1 %
MCH RBC QN AUTO: 29.1 PG (ref 26–34)
MCHC RBC AUTO-ENTMCNC: 33.3 G/DL (ref 31–36)
MCV RBC AUTO: 87.4 FL (ref 80–100)
MONOCYTES ABSOLUTE: 0.8 K/UL (ref 0–1.3)
MONOCYTES RELATIVE PERCENT: 7.9 %
NEUTROPHILS ABSOLUTE: 5.9 K/UL (ref 1.7–7.7)
NEUTROPHILS RELATIVE PERCENT: 58.7 %
PDW BLD-RTO: 15.5 % (ref 12.4–15.4)
PHOSPHORUS: 3.8 MG/DL (ref 2.5–4.9)
PLATELET # BLD: 227 K/UL (ref 135–450)
PMV BLD AUTO: 11.5 FL (ref 5–10.5)
POTASSIUM SERPL-SCNC: 4.3 MMOL/L (ref 3.5–5.1)
RBC # BLD: 4.61 M/UL (ref 4–5.2)
SODIUM BLD-SCNC: 141 MMOL/L (ref 136–145)
WBC # BLD: 10.1 K/UL (ref 4–11)

## 2018-07-26 ENCOUNTER — OFFICE VISIT (OUTPATIENT)
Dept: INTERNAL MEDICINE | Age: 63
End: 2018-07-26

## 2018-07-26 VITALS
SYSTOLIC BLOOD PRESSURE: 161 MMHG | HEIGHT: 67 IN | WEIGHT: 293 LBS | BODY MASS INDEX: 45.99 KG/M2 | DIASTOLIC BLOOD PRESSURE: 64 MMHG

## 2018-07-26 DIAGNOSIS — M25.561 CHRONIC PAIN OF BOTH KNEES: ICD-10-CM

## 2018-07-26 DIAGNOSIS — G89.29 CHRONIC PAIN OF BOTH KNEES: ICD-10-CM

## 2018-07-26 DIAGNOSIS — Z79.4 TYPE 2 DIABETES MELLITUS WITH COMPLICATION, WITH LONG-TERM CURRENT USE OF INSULIN (HCC): ICD-10-CM

## 2018-07-26 DIAGNOSIS — E11.8 TYPE 2 DIABETES MELLITUS WITH COMPLICATION, WITH LONG-TERM CURRENT USE OF INSULIN (HCC): ICD-10-CM

## 2018-07-26 DIAGNOSIS — M72.2 PLANTAR FASCIITIS: ICD-10-CM

## 2018-07-26 DIAGNOSIS — M25.562 CHRONIC PAIN OF BOTH KNEES: ICD-10-CM

## 2018-07-26 DIAGNOSIS — I10 ESSENTIAL HYPERTENSION: ICD-10-CM

## 2018-07-26 PROCEDURE — 99214 OFFICE O/P EST MOD 30 MIN: CPT | Performed by: INTERNAL MEDICINE

## 2018-07-26 ASSESSMENT — ENCOUNTER SYMPTOMS
COLOR CHANGE: 0
BACK PAIN: 0
WHEEZING: 0
CHEST TIGHTNESS: 0
ABDOMINAL PAIN: 0

## 2018-07-26 NOTE — PROGRESS NOTES
Subjective:      Patient ID: Lili Pat is a 65 y.o. female. Chief Complaint   Patient presents with    Foot Pain     bilat feet pain ,rercurring (PLANTAR FASCITIS)     HAVING SEVERE susie heel pain- has had plantar fasciitis in the past- bp's sl up at home but in 140 range- utd w Dr. Ofelia Metzger still ooc- no progress w wt loss-conts w lbp and susie knee pain    Review of Systems   Constitutional: Positive for fatigue. Negative for activity change and appetite change. HENT: Negative for congestion. Eyes: Negative for visual disturbance. Respiratory: Negative for chest tightness and wheezing. Cardiovascular: Negative for chest pain and palpitations. Gastrointestinal: Negative for abdominal pain. Musculoskeletal: Positive for arthralgias and gait problem. Negative for back pain. Skin: Negative for color change and rash. Neurological: Negative for weakness, light-headedness and headaches. Psychiatric/Behavioral: Positive for sleep disturbance. Objective:   Physical Exam   Constitutional: She is oriented to person, place, and time. She appears well-developed and well-nourished. Morbidly obese female   HENT:   Head: Normocephalic and atraumatic. Eyes: Conjunctivae and EOM are normal. Pupils are equal, round, and reactive to light. Neck: Normal range of motion. Neck supple. Cardiovascular: Normal rate, regular rhythm and normal heart sounds. No carotid bruit auscultated bilaterally   Pulmonary/Chest: Effort normal and breath sounds normal. No respiratory distress. Abdominal: She exhibits no distension. There is no tenderness. Musculoskeletal: She exhibits tenderness. Pt tenderness over plantar fascia susie l>r  susie knee pain   Lymphadenopathy:     She has no cervical adenopathy. Neurological: She is alert and oriented to person, place, and time. Skin: Skin is warm and dry. Psychiatric: She has a normal mood and affect.  Her behavior is normal. Judgment and thought content normal.         Assessment and Plan:      Plantar fasciitis  See orders for patient and pt also given complete instructions re: course of therapy      Hypertension  Sl up- monitor and call if staying above 130/80    Obese  Discussed with patient at length health risks of obesity and need for diet and exercise      Diabetes mellitus ([facility] [address]  OOC- cont to work w Dr. Jeny Ceja    Chronic pain of both knees  Try to work on wt loss and leg strengthening       Encounter Diagnoses   Name Primary?  Plantar fasciitis     Essential hypertension     Class 3 obesity due to excess calories with serious comorbidity and body mass index (BMI) of 40.0 to 44.9 in adult [facility])     Type 2 diabetes mellitus with complication, with long-term current use of insulin (HCC)     Chronic pain of both knees        No orders of the defined types were placed in this encounter.

## 2018-07-31 ENCOUNTER — TELEPHONE (OUTPATIENT)
Dept: INTERNAL MEDICINE | Age: 63
End: 2018-07-31

## 2018-07-31 ENCOUNTER — OFFICE VISIT (OUTPATIENT)
Dept: DERMATOLOGY | Age: 63
End: 2018-07-31

## 2018-07-31 DIAGNOSIS — I83.10 LIPODERMATOSCLEROSIS: Primary | ICD-10-CM

## 2018-07-31 DIAGNOSIS — R60.0 LOWER EXTREMITY EDEMA: ICD-10-CM

## 2018-07-31 DIAGNOSIS — Z87.891 FORMER SMOKER: ICD-10-CM

## 2018-07-31 DIAGNOSIS — L85.3 XEROSIS CUTIS: ICD-10-CM

## 2018-07-31 PROCEDURE — 99242 OFF/OP CONSLTJ NEW/EST SF 20: CPT | Performed by: DERMATOLOGY

## 2018-07-31 RX ORDER — CIPROFLOXACIN 500 MG/1
500 TABLET, FILM COATED ORAL 2 TIMES DAILY
Qty: 14 TABLET | Refills: 0 | Status: SHIPPED | OUTPATIENT
Start: 2018-07-31 | End: 2018-08-07

## 2018-07-31 NOTE — PROGRESS NOTES
Lipodermatosclerosis    2. Lower extremity edema    3. Xerosis cutis    4. Former smoker        1. Lipodermatosclerosis  No ulcerations present, very subtle erythema noted, overall stable condition  -Counseled patient about condition and how chronic vascular compromise due to DM and chronic episodes leg edema can lead to thickening and discoloration of the skin in dependent areas. Treatment topically can help to soften plaques with goal of tx to prevent erosions and ulcers in skin but often much of the change is irreversible and possible to experience flares due to underlying etiology     -Patient counseled to use a gentle cleanser such as Dove or Cetaphil daily, do not take more than 1 shower daily with warm water and spend less than 10 minutes in shower/bath, pat dry and then apply thick moisturizer like OTC CeraVe cream in jar liberally at least twice daily. Avoid fragrances and dyes and use only fragrance free detergents. -Apply topical corticosteroid as prescribed below to affected areas on lower extremities     -Referral sent to vascular surgery for help with underlying leg edema and fitting for compression stockings    Apply betamethasone ointment to hardened/firm areas only BID for 2 weeks. -Edu re: sparing use, atrophy, striae, hypopigmentation, telangiectasias. (PCP gave Rx prior, patient has plenty left)    RTC 4 weeks    2. Lower extremity edema  -Referral sent to vascular surgery for help with underlying leg edema, evaluate extent of peripheral vascular disease, and fitting for compression stockings  -Elevate legs above the level of the heart when reclined at rest.  -Encourage compliance with Lasix as prescribed by PCP, patient does not take as directed due to increased frequency of urination and difficulty with occupation. Follow-up with PCP as needed  - Ro Islas MD    3.  Xerosis cutis  -Patient counseled to use a gentle cleanser such as Cetaphil daily, do not take more than 1

## 2018-08-06 ENCOUNTER — TELEPHONE (OUTPATIENT)
Dept: VASCULAR SURGERY | Age: 63
End: 2018-08-06

## 2018-08-24 DIAGNOSIS — Z79.4 TYPE 2 DIABETES MELLITUS WITH COMPLICATION, WITH LONG-TERM CURRENT USE OF INSULIN (HCC): Primary | ICD-10-CM

## 2018-08-24 DIAGNOSIS — E11.8 TYPE 2 DIABETES MELLITUS WITH COMPLICATION, WITH LONG-TERM CURRENT USE OF INSULIN (HCC): Primary | ICD-10-CM

## 2018-08-28 ENCOUNTER — OFFICE VISIT (OUTPATIENT)
Dept: VASCULAR SURGERY | Age: 63
End: 2018-08-28

## 2018-08-28 VITALS
WEIGHT: 293 LBS | TEMPERATURE: 98.1 F | BODY MASS INDEX: 47.09 KG/M2 | SYSTOLIC BLOOD PRESSURE: 183 MMHG | HEIGHT: 66 IN | DIASTOLIC BLOOD PRESSURE: 85 MMHG

## 2018-08-28 DIAGNOSIS — I87.2 VENOUS STASIS DERMATITIS OF BOTH LOWER EXTREMITIES: ICD-10-CM

## 2018-08-28 DIAGNOSIS — E66.01 CLASS 3 SEVERE OBESITY DUE TO EXCESS CALORIES WITH SERIOUS COMORBIDITY AND BODY MASS INDEX (BMI) OF 40.0 TO 44.9 IN ADULT (HCC): Primary | ICD-10-CM

## 2018-08-28 PROCEDURE — 29580 STRAPPING UNNA BOOT: CPT | Performed by: SURGERY

## 2018-08-28 PROCEDURE — 99203 OFFICE O/P NEW LOW 30 MIN: CPT | Performed by: SURGERY

## 2018-08-28 NOTE — PROGRESS NOTES
Number of children: N/A    Years of education: N/A     Occupational History    Not on file. Social History Main Topics    Smoking status: Former Smoker     Packs/day: 0.50     Years: 10.00     Types: Cigarettes     Quit date: 5/22/1995    Smokeless tobacco: Never Used    Alcohol use Yes      Comment: once a month    Drug use: No    Sexual activity: Not on file     Other Topics Concern    Not on file     Social History Narrative    No narrative on file       Family History:  History reviewed. No pertinent family history. Review of Systems:  ROS  all pertinent items are noted in the HPI    Physical Examination:    Vitals:    08/28/18 1436   BP: (!) 183/85   Temp: 98.1 °F (36.7 °C)   Weight: (!) 344 lb (156 kg)   Height: 5' 6\" (1.676 m)     Body mass index is 55.52 kg/m². Physical Exam   Constitutional: She is oriented to person, place, and time. She appears well-developed and well-nourished. No distress. Morbidly obese, very pleasant and conversive. Neck: Normal range of motion. Neck supple. No JVD present. Cardiovascular: Normal rate and regular rhythm. Exam reveals no gallop and no friction rub. No murmur heard. Pulmonary/Chest: Effort normal and breath sounds normal. No respiratory distress. She has no wheezes. She has no rales. Abdominal: Soft. Bowel sounds are normal. She exhibits no distension. There is no tenderness. Neurological: She is alert and oriented to person, place, and time. Skin: Skin is warm and dry. Psychiatric: She has a normal mood and affect. Her behavior is normal. Judgment and thought content normal.   Extremities:  Bilateral lower sugars are warm and well-perfused with 2+ chronic, nonpitting edema of the bilateral lower legs and ankles. .  The feet do not have swelling. She has 2+ palpable dorsalis pedis and posterior tibial pulse. She has mild to moderate erythematous discoloration of the lower legs consistent with venous insufficiency. No cellulitis. No weeping or wound breakdown. Diagnosis:    1. Chronic venous insufficiency with bilateral lower extremity swelling  2. Morbid obesity    Plan: We had a long discussion regarding compression and how this is the mainstay of her treatment. She currently has CircAid; however, she is not applying these correctly. She does not have the measuring device which is necessary for donning these appropriately. We have placed her in bilateral Unna boots today and arrangements have been made for Mindy from [facility] [place] to meet with her next week at which time her inability to be removed and she will be appropriately measured for compression. We will also try to get her a measuring device for the current CircAid's that she has.

## 2018-09-04 ENCOUNTER — OFFICE VISIT (OUTPATIENT)
Dept: VASCULAR SURGERY | Age: 63
End: 2018-09-04

## 2018-09-04 VITALS
BODY MASS INDEX: 45.99 KG/M2 | HEIGHT: 67 IN | DIASTOLIC BLOOD PRESSURE: 81 MMHG | HEART RATE: 111 BPM | SYSTOLIC BLOOD PRESSURE: 145 MMHG | TEMPERATURE: 98.5 F | WEIGHT: 293 LBS

## 2018-09-04 DIAGNOSIS — M79.89 LEG SWELLING: Primary | ICD-10-CM

## 2018-09-04 PROCEDURE — NSCHGE NO SHOW CHARGE: Performed by: SURGERY

## 2018-09-04 PROCEDURE — 99212 OFFICE O/P EST SF 10 MIN: CPT | Performed by: SURGERY

## 2018-09-04 NOTE — PROGRESS NOTES
Patient here for stocking fitting. Cosme boots removed. Katie Ratel with [facility] in and states patient has good stockings, but had been applying them wrong. Per Katie Solares, patient instructed on how to place compression stocking correctly and verbalized understanding.

## 2018-09-18 LAB — PAP SMEAR: NORMAL

## 2018-09-19 ENCOUNTER — TELEPHONE (OUTPATIENT)
Dept: INTERNAL MEDICINE | Age: 63
End: 2018-09-19

## 2018-09-19 RX ORDER — HYDROCHLOROTHIAZIDE 12.5 MG/1
12.5 CAPSULE, GELATIN COATED ORAL DAILY
Qty: 30 CAPSULE | Refills: 3 | Status: SHIPPED | OUTPATIENT
Start: 2018-09-19 | End: 2018-11-12 | Stop reason: SDUPTHER

## 2018-09-19 NOTE — TELEPHONE ENCOUNTER
Prescription Question     From  Zoya Ramos To  [facility] [place] [facility] Practice Support Sent  9/19/2018  4:12 PM   Hello! I am having side effects from the Furesmide(water pill).  I am having severe diarrhea. I have discontinued taking the medication at this time. But of course, I am having inflammation or edema in my legs.  What should I do? Thank you!

## 2018-09-24 ENCOUNTER — HOSPITAL ENCOUNTER (OUTPATIENT)
Dept: MAMMOGRAPHY | Age: 63
Discharge: HOME | End: 2018-09-24
Payer: COMMERCIAL

## 2018-09-24 DIAGNOSIS — Z12.31 VISIT FOR SCREENING MAMMOGRAM: ICD-10-CM

## 2018-09-24 PROCEDURE — 77067 SCR MAMMO BI INCL CAD: CPT

## 2018-09-29 ENCOUNTER — OFFICE VISIT (OUTPATIENT)
Dept: INTERNAL MEDICINE CLINIC | Age: 63
End: 2018-09-29
Payer: COMMERCIAL

## 2018-09-29 VITALS
BODY MASS INDEX: 45.99 KG/M2 | SYSTOLIC BLOOD PRESSURE: 140 MMHG | DIASTOLIC BLOOD PRESSURE: 80 MMHG | WEIGHT: 293 LBS | HEIGHT: 67 IN

## 2018-09-29 DIAGNOSIS — K21.9 GASTROESOPHAGEAL REFLUX DISEASE WITHOUT ESOPHAGITIS: ICD-10-CM

## 2018-09-29 DIAGNOSIS — Z23 NEED FOR INFLUENZA VACCINATION: ICD-10-CM

## 2018-09-29 DIAGNOSIS — F32.A DEPRESSION, UNSPECIFIED DEPRESSION TYPE: ICD-10-CM

## 2018-09-29 DIAGNOSIS — Z11.4 ENCOUNTER FOR SCREENING FOR HIV: ICD-10-CM

## 2018-09-29 DIAGNOSIS — Z79.4 TYPE 2 DIABETES MELLITUS WITH COMPLICATION, WITH LONG-TERM CURRENT USE OF INSULIN (HCC): ICD-10-CM

## 2018-09-29 DIAGNOSIS — I10 ESSENTIAL HYPERTENSION: ICD-10-CM

## 2018-09-29 DIAGNOSIS — E66.01 CLASS 3 SEVERE OBESITY DUE TO EXCESS CALORIES WITH SERIOUS COMORBIDITY AND BODY MASS INDEX (BMI) OF 40.0 TO 44.9 IN ADULT (HCC): ICD-10-CM

## 2018-09-29 DIAGNOSIS — R32 URINARY INCONTINENCE, UNSPECIFIED TYPE: ICD-10-CM

## 2018-09-29 DIAGNOSIS — Z00.00 WELL ADULT EXAM: Primary | ICD-10-CM

## 2018-09-29 DIAGNOSIS — Z23 NEED FOR PNEUMOCOCCAL VACCINATION: ICD-10-CM

## 2018-09-29 DIAGNOSIS — F41.9 ANXIETY: ICD-10-CM

## 2018-09-29 DIAGNOSIS — E11.8 TYPE 2 DIABETES MELLITUS WITH COMPLICATION, WITH LONG-TERM CURRENT USE OF INSULIN (HCC): ICD-10-CM

## 2018-09-29 PROCEDURE — 90472 IMMUNIZATION ADMIN EACH ADD: CPT | Performed by: INTERNAL MEDICINE

## 2018-09-29 PROCEDURE — 90732 PPSV23 VACC 2 YRS+ SUBQ/IM: CPT | Performed by: INTERNAL MEDICINE

## 2018-09-29 PROCEDURE — 90686 IIV4 VACC NO PRSV 0.5 ML IM: CPT | Performed by: INTERNAL MEDICINE

## 2018-09-29 PROCEDURE — 99396 PREV VISIT EST AGE 40-64: CPT | Performed by: INTERNAL MEDICINE

## 2018-09-29 PROCEDURE — 90471 IMMUNIZATION ADMIN: CPT | Performed by: INTERNAL MEDICINE

## 2018-09-29 RX ORDER — FLUOXETINE HYDROCHLORIDE 40 MG/1
CAPSULE ORAL
Qty: 90 CAPSULE | Refills: 3 | Status: SHIPPED | OUTPATIENT
Start: 2018-09-29 | End: 2018-11-12 | Stop reason: SDUPTHER

## 2018-09-29 RX ORDER — BUSPIRONE HYDROCHLORIDE 15 MG/1
15 TABLET ORAL 3 TIMES DAILY PRN
Qty: 60 TABLET | Refills: 3 | Status: SHIPPED | OUTPATIENT
Start: 2018-09-29 | End: 2020-01-07 | Stop reason: SDUPTHER

## 2018-09-29 ASSESSMENT — ENCOUNTER SYMPTOMS
BACK PAIN: 0
ABDOMINAL PAIN: 0
CHEST TIGHTNESS: 0
COLOR CHANGE: 0
WHEEZING: 0

## 2018-09-29 NOTE — PATIENT INSTRUCTIONS
Restart your 3 times a day humolog!!! Weight watchers is probably the most effective diet plan out there!!   But if you would rather just count calories that is very effective also! Try  myplate. com or OmniStrat OR the "[employer] talia to track your calories    try to get under [id number] kcal/day    increase exercise gradually to 45-50 mins 4 times weekly   There are a lot of exercise routines you can watch and DO on the internet- one is the [place] 7 minute workout! DO NOT SKIP MEALS- preferably eat frequent small healthy snacks all day long  If you'd like to try a more regimented diet here are a few I recommend  The Lenice Lingo Method is very helpful-check it out on line   ? ?  Whole 30 diet  The Metabolism Plan- Janny-Roxi Recitas    Increase prozak to 40 mg daily- I sent rx for the 40mg to the pharmacy  Try the buspar for anxiety- start w 1/2 tab but you can take up to 2 2-3 times daily

## 2018-09-29 NOTE — PROGRESS NOTES
loratadine (CLARITIN) 10 MG tablet Take 10 mg by mouth daily      NORETHINDRONE PO Take 10 mg by mouth nightly      ONE TOUCH LANCETS MISC 1 each by Does not apply route daily 100 each 3     No current facility-administered medications for this visit. Vitals:    18 1113 18 1123   BP: (!) 160/90 (!) 140/80   Site: Left Upper Arm Left Upper Arm   Position: Sitting Sitting   Cuff Size: Large Adult Large Adult   Weight: (!) 347 lb 3.2 oz (157.5 kg)    Height: 5' 7\" (1.702 m)      Body mass index is 54.38 kg/m².      Wt Readings from Last 3 Encounters:   18 (!) 347 lb 3.2 oz (157.5 kg)   18 (!) 344 lb (156 kg)   18 (!) 344 lb (156 kg)     BP Readings from Last 3 Encounters:   18 (!) 140/80   18 (!) 145/81   18 (!) 183/85         Immunization History   Administered Date(s) Administered    Influenza Virus Vaccine 2015    Influenza, Intradermal, Quadrivalent, Preservative Free 2016, 2017    Influenza, Tyler Méndez, 3 yrs and older, IM, PF (Fluzone 3 yrs and older or Afluria 5 yrs and older) 2018    Pneumococcal 13-valent Conjugate ([id number]) 2015    Pneumococcal Conjugate 7-valent 2007    Pneumococcal Polysaccharide ([id number]) 2018    Tdap (Boostrix, Adacel) 2011    Tetanus 1999       Past Medical History:   Diagnosis Date    Anxiety and depression     Asthma     Edema     GERD (gastroesophageal reflux disease)     Hyperlipidemia     Hypertension     IBS (irritable bowel syndrome)     Morbid obesity ([address]     Multinodular goiter 2010    Personal history of colonic polyps     Dr. Toby Mazariegos- due for repeat colonoscopy     Positive PPD, treated     INH completed 2001    S/P cholecystectomy 2011    Type II or unspecified type diabetes mellitus without mention of complication, not stated as uncontrolled      Past Surgical History:   Procedure Laterality Date     SECTION N/A 30 plus

## 2018-10-05 ENCOUNTER — TELEPHONE (OUTPATIENT)
Dept: INTERNAL MEDICINE CLINIC | Age: 63
End: 2018-10-05

## 2018-10-05 RX ORDER — MOMETASONE FUROATE 1 MG/G
OINTMENT TOPICAL
Qty: 1 TUBE | Refills: 1 | Status: SHIPPED | OUTPATIENT
Start: 2018-10-05 | End: 2019-05-24 | Stop reason: ALTCHOICE

## 2018-11-12 ENCOUNTER — OFFICE VISIT (OUTPATIENT)
Dept: INTERNAL MEDICINE CLINIC | Age: 63
End: 2018-11-12
Payer: COMMERCIAL

## 2018-11-12 VITALS
SYSTOLIC BLOOD PRESSURE: 151 MMHG | WEIGHT: 293 LBS | HEIGHT: 67 IN | DIASTOLIC BLOOD PRESSURE: 76 MMHG | BODY MASS INDEX: 45.99 KG/M2

## 2018-11-12 DIAGNOSIS — Z79.4 TYPE 2 DIABETES MELLITUS WITH COMPLICATION, WITH LONG-TERM CURRENT USE OF INSULIN (HCC): ICD-10-CM

## 2018-11-12 DIAGNOSIS — E78.00 PURE HYPERCHOLESTEROLEMIA: ICD-10-CM

## 2018-11-12 DIAGNOSIS — E66.01 CLASS 3 SEVERE OBESITY DUE TO EXCESS CALORIES WITH SERIOUS COMORBIDITY AND BODY MASS INDEX (BMI) OF 40.0 TO 44.9 IN ADULT (HCC): ICD-10-CM

## 2018-11-12 DIAGNOSIS — E11.8 TYPE 2 DIABETES MELLITUS WITH COMPLICATION, WITH LONG-TERM CURRENT USE OF INSULIN (HCC): ICD-10-CM

## 2018-11-12 DIAGNOSIS — I10 ESSENTIAL HYPERTENSION: ICD-10-CM

## 2018-11-12 DIAGNOSIS — I87.2 VENOUS STASIS DERMATITIS OF BOTH LOWER EXTREMITIES: ICD-10-CM

## 2018-11-12 DIAGNOSIS — F32.A DEPRESSION, UNSPECIFIED DEPRESSION TYPE: ICD-10-CM

## 2018-11-12 PROCEDURE — 99214 OFFICE O/P EST MOD 30 MIN: CPT | Performed by: INTERNAL MEDICINE

## 2018-11-12 RX ORDER — MONTELUKAST SODIUM 10 MG/1
10 TABLET ORAL NIGHTLY
Qty: 90 TABLET | Refills: 3 | Status: SHIPPED | OUTPATIENT
Start: 2018-11-12 | End: 2019-01-15

## 2018-11-12 RX ORDER — ATORVASTATIN CALCIUM 40 MG/1
40 TABLET, FILM COATED ORAL DAILY
Qty: 90 TABLET | Refills: 1 | Status: SHIPPED | OUTPATIENT
Start: 2018-11-12 | End: 2020-01-02

## 2018-11-12 RX ORDER — FLUOXETINE HYDROCHLORIDE 40 MG/1
CAPSULE ORAL
Qty: 90 CAPSULE | Refills: 3 | Status: SHIPPED | OUTPATIENT
Start: 2018-11-12 | End: 2020-01-02

## 2018-11-12 RX ORDER — HYDROCHLOROTHIAZIDE 12.5 MG/1
12.5 CAPSULE, GELATIN COATED ORAL DAILY
Qty: 90 CAPSULE | Refills: 3 | Status: SHIPPED | OUTPATIENT
Start: 2018-11-12 | End: 2019-12-13

## 2018-11-12 NOTE — PATIENT INSTRUCTIONS
Add flonase daily  Or twice daily  If that isn't doing it for the allergies- add singulair- fill printed rx   Check bp 3-4 times a week preferably when you get home from work and other days in am after sitting for 5 mins but no caffeine,smoking or exercise for 30 mins prior-  and in a chair w a back and both feet down-recheck in another 5 mins  Goal is under 130/80  If its not coming down in the next few weeks call me   When you get the new [facility] Stop simvastatin and change to lipitor ([facility])

## 2018-11-15 ASSESSMENT — ENCOUNTER SYMPTOMS
ABDOMINAL PAIN: 0
BACK PAIN: 0
COLOR CHANGE: 0
CHEST TIGHTNESS: 0
WHEEZING: 0

## 2018-11-26 ENCOUNTER — TELEPHONE (OUTPATIENT)
Dept: INTERNAL MEDICINE CLINIC | Age: 63
End: 2018-11-26

## 2019-01-07 ENCOUNTER — OFFICE VISIT (OUTPATIENT)
Dept: INTERNAL MEDICINE CLINIC | Age: 64
End: 2019-01-07
Payer: COMMERCIAL

## 2019-01-07 VITALS
BODY MASS INDEX: 45.99 KG/M2 | DIASTOLIC BLOOD PRESSURE: 80 MMHG | TEMPERATURE: 99 F | WEIGHT: 293 LBS | SYSTOLIC BLOOD PRESSURE: 142 MMHG | HEIGHT: 67 IN

## 2019-01-07 DIAGNOSIS — F32.A DEPRESSION, UNSPECIFIED DEPRESSION TYPE: ICD-10-CM

## 2019-01-07 DIAGNOSIS — M54.50 CHRONIC BILATERAL LOW BACK PAIN WITHOUT SCIATICA: ICD-10-CM

## 2019-01-07 DIAGNOSIS — E66.01 CLASS 3 SEVERE OBESITY DUE TO EXCESS CALORIES WITH SERIOUS COMORBIDITY AND BODY MASS INDEX (BMI) OF 40.0 TO 44.9 IN ADULT (HCC): ICD-10-CM

## 2019-01-07 DIAGNOSIS — J06.9 VIRAL URI: ICD-10-CM

## 2019-01-07 DIAGNOSIS — J02.9 SORE THROAT: Primary | ICD-10-CM

## 2019-01-07 DIAGNOSIS — I10 ESSENTIAL HYPERTENSION: ICD-10-CM

## 2019-01-07 DIAGNOSIS — G47.33 OBSTRUCTIVE SLEEP APNEA SYNDROME: ICD-10-CM

## 2019-01-07 DIAGNOSIS — E11.8 TYPE 2 DIABETES MELLITUS WITH COMPLICATION, WITH LONG-TERM CURRENT USE OF INSULIN (HCC): ICD-10-CM

## 2019-01-07 DIAGNOSIS — Z79.4 TYPE 2 DIABETES MELLITUS WITH COMPLICATION, WITH LONG-TERM CURRENT USE OF INSULIN (HCC): ICD-10-CM

## 2019-01-07 DIAGNOSIS — G89.29 CHRONIC BILATERAL LOW BACK PAIN WITHOUT SCIATICA: ICD-10-CM

## 2019-01-07 LAB — S PYO AG THROAT QL: NORMAL

## 2019-01-07 PROCEDURE — 99214 OFFICE O/P EST MOD 30 MIN: CPT | Performed by: INTERNAL MEDICINE

## 2019-01-07 PROCEDURE — 87880 STREP A ASSAY W/OPTIC: CPT | Performed by: INTERNAL MEDICINE

## 2019-01-07 RX ORDER — PROMETHAZINE HYDROCHLORIDE AND CODEINE PHOSPHATE 6.25; 1 MG/5ML; MG/5ML
5 SYRUP ORAL EVERY 4 HOURS PRN
Qty: 240 ML | Refills: 1 | Status: SHIPPED | OUTPATIENT
Start: 2019-01-07 | End: 2019-05-24 | Stop reason: ALTCHOICE

## 2019-01-07 RX ORDER — BENZONATATE 200 MG/1
200 CAPSULE ORAL 3 TIMES DAILY PRN
Qty: 30 CAPSULE | Refills: 3 | Status: SHIPPED | OUTPATIENT
Start: 2019-01-07 | End: 2019-01-14

## 2019-01-07 ASSESSMENT — PATIENT HEALTH QUESTIONNAIRE - PHQ9
SUM OF ALL RESPONSES TO PHQ QUESTIONS 1-9: 2
SUM OF ALL RESPONSES TO PHQ QUESTIONS 1-9: 2
SUM OF ALL RESPONSES TO PHQ9 QUESTIONS 1 & 2: 2
1. LITTLE INTEREST OR PLEASURE IN DOING THINGS: 1
2. FEELING DOWN, DEPRESSED OR HOPELESS: 1

## 2019-01-07 ASSESSMENT — ENCOUNTER SYMPTOMS
COUGH: 0
WHEEZING: 0
SINUS PRESSURE: 1
RHINORRHEA: 1
ABDOMINAL PAIN: 0

## 2019-01-15 ENCOUNTER — OFFICE VISIT (OUTPATIENT)
Dept: ENDOCRINOLOGY | Age: 64
End: 2019-01-15
Payer: COMMERCIAL

## 2019-01-15 VITALS
RESPIRATION RATE: 16 BRPM | BODY MASS INDEX: 45.99 KG/M2 | SYSTOLIC BLOOD PRESSURE: 180 MMHG | WEIGHT: 293 LBS | DIASTOLIC BLOOD PRESSURE: 90 MMHG | OXYGEN SATURATION: 97 % | HEIGHT: 67 IN | HEART RATE: 87 BPM

## 2019-01-15 DIAGNOSIS — E04.2 MULTIPLE THYROID NODULES: ICD-10-CM

## 2019-01-15 DIAGNOSIS — I10 ESSENTIAL HYPERTENSION: ICD-10-CM

## 2019-01-15 DIAGNOSIS — F41.9 ANXIETY: ICD-10-CM

## 2019-01-15 DIAGNOSIS — Z11.4 ENCOUNTER FOR SCREENING FOR HIV: ICD-10-CM

## 2019-01-15 DIAGNOSIS — K21.9 GASTROESOPHAGEAL REFLUX DISEASE WITHOUT ESOPHAGITIS: ICD-10-CM

## 2019-01-15 DIAGNOSIS — E11.8 TYPE 2 DIABETES MELLITUS WITH COMPLICATION, WITH LONG-TERM CURRENT USE OF INSULIN (HCC): ICD-10-CM

## 2019-01-15 DIAGNOSIS — Z79.4 TYPE 2 DIABETES MELLITUS WITH COMPLICATION, WITH LONG-TERM CURRENT USE OF INSULIN (HCC): Primary | ICD-10-CM

## 2019-01-15 DIAGNOSIS — Z00.00 WELL ADULT EXAM: ICD-10-CM

## 2019-01-15 DIAGNOSIS — E78.49 OTHER HYPERLIPIDEMIA: ICD-10-CM

## 2019-01-15 DIAGNOSIS — Z79.4 TYPE 2 DIABETES MELLITUS WITH COMPLICATION, WITH LONG-TERM CURRENT USE OF INSULIN (HCC): ICD-10-CM

## 2019-01-15 DIAGNOSIS — F32.A DEPRESSION, UNSPECIFIED DEPRESSION TYPE: ICD-10-CM

## 2019-01-15 DIAGNOSIS — E11.8 TYPE 2 DIABETES MELLITUS WITH COMPLICATION, WITH LONG-TERM CURRENT USE OF INSULIN (HCC): Primary | ICD-10-CM

## 2019-01-15 LAB
A/G RATIO: 1.3 (ref 1.1–2.2)
ALBUMIN SERPL-MCNC: 4.3 G/DL (ref 3.4–5)
ALP BLD-CCNC: 148 U/L (ref 40–129)
ALT SERPL-CCNC: 14 U/L (ref 10–40)
ANION GAP SERPL CALCULATED.3IONS-SCNC: 16 MMOL/L (ref 3–16)
AST SERPL-CCNC: 12 U/L (ref 15–37)
BASOPHILS ABSOLUTE: 0 K/UL (ref 0–0.2)
BASOPHILS RELATIVE PERCENT: 0.4 %
BILIRUB SERPL-MCNC: 0.3 MG/DL (ref 0–1)
BUN BLDV-MCNC: 8 MG/DL (ref 7–20)
CALCIUM SERPL-MCNC: 9.7 MG/DL (ref 8.3–10.6)
CHLORIDE BLD-SCNC: 98 MMOL/L (ref 99–110)
CHOLESTEROL, TOTAL: 161 MG/DL (ref 0–199)
CO2: 29 MMOL/L (ref 21–32)
CREAT SERPL-MCNC: 0.7 MG/DL (ref 0.6–1.2)
CREATININE URINE: 155 MG/DL (ref 28–259)
EOSINOPHILS ABSOLUTE: 0.1 K/UL (ref 0–0.6)
EOSINOPHILS RELATIVE PERCENT: 1 %
GFR AFRICAN AMERICAN: >60
GFR NON-AFRICAN AMERICAN: >60
GLOBULIN: 3.4 G/DL
GLUCOSE BLD-MCNC: 127 MG/DL (ref 70–99)
HBA1C MFR BLD: 9.9 %
HCT VFR BLD CALC: 43.9 % (ref 36–48)
HDLC SERPL-MCNC: 63 MG/DL (ref 40–60)
HEMOGLOBIN: 14.5 G/DL (ref 12–16)
LDL CHOLESTEROL CALCULATED: 83 MG/DL
LYMPHOCYTES ABSOLUTE: 3.7 K/UL (ref 1–5.1)
LYMPHOCYTES RELATIVE PERCENT: 35.9 %
MCH RBC QN AUTO: 29.5 PG (ref 26–34)
MCHC RBC AUTO-ENTMCNC: 33 G/DL (ref 31–36)
MCV RBC AUTO: 89.5 FL (ref 80–100)
MICROALBUMIN UR-MCNC: 2.2 MG/DL
MICROALBUMIN/CREAT UR-RTO: 14.2 MG/G (ref 0–30)
MONOCYTES ABSOLUTE: 0.6 K/UL (ref 0–1.3)
MONOCYTES RELATIVE PERCENT: 5.8 %
NEUTROPHILS ABSOLUTE: 5.9 K/UL (ref 1.7–7.7)
NEUTROPHILS RELATIVE PERCENT: 56.9 %
PDW BLD-RTO: 14 % (ref 12.4–15.4)
PLATELET # BLD: 228 K/UL (ref 135–450)
PMV BLD AUTO: 11.6 FL (ref 5–10.5)
POTASSIUM SERPL-SCNC: 4.1 MMOL/L (ref 3.5–5.1)
RBC # BLD: 4.91 M/UL (ref 4–5.2)
SODIUM BLD-SCNC: 143 MMOL/L (ref 136–145)
TOTAL PROTEIN: 7.7 G/DL (ref 6.4–8.2)
TRIGL SERPL-MCNC: 75 MG/DL (ref 0–150)
TSH SERPL DL<=0.05 MIU/L-ACNC: 1.35 UIU/ML (ref 0.27–4.2)
VITAMIN D 25-HYDROXY: 24.3 NG/ML
VLDLC SERPL CALC-MCNC: 15 MG/DL
WBC # BLD: 10.3 K/UL (ref 4–11)

## 2019-01-15 PROCEDURE — 83036 HEMOGLOBIN GLYCOSYLATED A1C: CPT | Performed by: INTERNAL MEDICINE

## 2019-01-15 PROCEDURE — 99215 OFFICE O/P EST HI 40 MIN: CPT | Performed by: INTERNAL MEDICINE

## 2019-01-16 LAB
HIV AG/AB: NORMAL
HIV ANTIGEN: NORMAL
HIV-1 ANTIBODY: NORMAL
HIV-2 AB: NORMAL

## 2019-01-28 DIAGNOSIS — F51.01 PRIMARY INSOMNIA: ICD-10-CM

## 2019-01-28 DIAGNOSIS — I10 ESSENTIAL HYPERTENSION: ICD-10-CM

## 2019-01-28 DIAGNOSIS — Z79.4 TYPE 2 DIABETES MELLITUS WITH COMPLICATION, WITH LONG-TERM CURRENT USE OF INSULIN (HCC): ICD-10-CM

## 2019-01-28 DIAGNOSIS — E78.00 PURE HYPERCHOLESTEROLEMIA: ICD-10-CM

## 2019-01-28 DIAGNOSIS — Z00.00 WELL ADULT EXAM: ICD-10-CM

## 2019-01-28 DIAGNOSIS — E11.8 TYPE 2 DIABETES MELLITUS WITH COMPLICATION, WITH LONG-TERM CURRENT USE OF INSULIN (HCC): ICD-10-CM

## 2019-01-28 RX ORDER — MONTELUKAST SODIUM 10 MG/1
10 TABLET ORAL NIGHTLY
Qty: 90 TABLET | Refills: 3 | Status: SHIPPED | OUTPATIENT
Start: 2019-01-28 | End: 2021-05-10 | Stop reason: ALTCHOICE

## 2019-01-28 RX ORDER — GABAPENTIN 100 MG/1
100 CAPSULE ORAL 4 TIMES DAILY
Qty: 120 CAPSULE | Refills: 5 | Status: SHIPPED | OUTPATIENT
Start: 2019-01-28 | End: 2019-05-24 | Stop reason: ALTCHOICE

## 2019-02-04 DIAGNOSIS — K21.9 GASTROESOPHAGEAL REFLUX DISEASE WITHOUT ESOPHAGITIS: ICD-10-CM

## 2019-02-04 DIAGNOSIS — E11.8 TYPE 2 DIABETES MELLITUS WITH COMPLICATION, WITH LONG-TERM CURRENT USE OF INSULIN (HCC): ICD-10-CM

## 2019-02-04 DIAGNOSIS — G47.33 OBSTRUCTIVE SLEEP APNEA SYNDROME: ICD-10-CM

## 2019-02-04 DIAGNOSIS — I10 ESSENTIAL HYPERTENSION: ICD-10-CM

## 2019-02-04 DIAGNOSIS — F51.01 PRIMARY INSOMNIA: ICD-10-CM

## 2019-02-04 DIAGNOSIS — R60.1 GENERALIZED EDEMA: ICD-10-CM

## 2019-02-04 DIAGNOSIS — Z79.4 TYPE 2 DIABETES MELLITUS WITH COMPLICATION, WITH LONG-TERM CURRENT USE OF INSULIN (HCC): ICD-10-CM

## 2019-02-04 RX ORDER — LOSARTAN POTASSIUM 100 MG/1
TABLET ORAL
Qty: 90 TABLET | Refills: 4 | Status: SHIPPED | OUTPATIENT
Start: 2019-02-04 | End: 2021-05-10

## 2019-04-17 ENCOUNTER — OFFICE VISIT (OUTPATIENT)
Dept: INTERNAL MEDICINE CLINIC | Age: 64
End: 2019-04-17
Payer: COMMERCIAL

## 2019-04-17 ENCOUNTER — HOSPITAL ENCOUNTER (OUTPATIENT)
Age: 64
Discharge: HOME | End: 2019-04-17
Payer: COMMERCIAL

## 2019-04-17 ENCOUNTER — HOSPITAL ENCOUNTER (OUTPATIENT)
Dept: GENERAL RADIOLOGY | Age: 64
Discharge: HOME | End: 2019-04-17
Payer: COMMERCIAL

## 2019-04-17 VITALS
WEIGHT: 293 LBS | BODY MASS INDEX: 45.99 KG/M2 | HEIGHT: 67 IN | DIASTOLIC BLOOD PRESSURE: 68 MMHG | SYSTOLIC BLOOD PRESSURE: 158 MMHG

## 2019-04-17 DIAGNOSIS — S89.91XA INJURY OF RIGHT KNEE, INITIAL ENCOUNTER: Primary | ICD-10-CM

## 2019-04-17 DIAGNOSIS — E66.01 CLASS 3 SEVERE OBESITY DUE TO EXCESS CALORIES WITH SERIOUS COMORBIDITY AND BODY MASS INDEX (BMI) OF 40.0 TO 44.9 IN ADULT (HCC): ICD-10-CM

## 2019-04-17 DIAGNOSIS — I10 ESSENTIAL HYPERTENSION: ICD-10-CM

## 2019-04-17 DIAGNOSIS — E11.8 TYPE 2 DIABETES MELLITUS WITH COMPLICATION, WITH LONG-TERM CURRENT USE OF INSULIN (HCC): ICD-10-CM

## 2019-04-17 DIAGNOSIS — S89.91XA INJURY OF RIGHT KNEE, INITIAL ENCOUNTER: ICD-10-CM

## 2019-04-17 DIAGNOSIS — Z79.4 TYPE 2 DIABETES MELLITUS WITH COMPLICATION, WITH LONG-TERM CURRENT USE OF INSULIN (HCC): ICD-10-CM

## 2019-04-17 DIAGNOSIS — F32.A DEPRESSION, UNSPECIFIED DEPRESSION TYPE: ICD-10-CM

## 2019-04-17 PROCEDURE — 99214 OFFICE O/P EST MOD 30 MIN: CPT | Performed by: INTERNAL MEDICINE

## 2019-04-17 PROCEDURE — 73562 X-RAY EXAM OF KNEE 3: CPT

## 2019-04-17 RX ORDER — MELOXICAM 15 MG/1
15 TABLET ORAL DAILY
Qty: 30 TABLET | Refills: 1 | Status: SHIPPED | OUTPATIENT
Start: 2019-04-17 | End: 2019-05-24 | Stop reason: ALTCHOICE

## 2019-04-17 ASSESSMENT — ENCOUNTER SYMPTOMS
BACK PAIN: 0
COLOR CHANGE: 0
SHORTNESS OF BREATH: 0
CHEST TIGHTNESS: 0
WHEEZING: 0
ABDOMINAL PAIN: 0

## 2019-04-17 ASSESSMENT — PATIENT HEALTH QUESTIONNAIRE - PHQ9
SUM OF ALL RESPONSES TO PHQ QUESTIONS 1-9: 0
SUM OF ALL RESPONSES TO PHQ9 QUESTIONS 1 & 2: 0
1. LITTLE INTEREST OR PLEASURE IN DOING THINGS: 0
SUM OF ALL RESPONSES TO PHQ QUESTIONS 1-9: 0
2. FEELING DOWN, DEPRESSED OR HOPELESS: 0

## 2019-04-17 NOTE — PROGRESS NOTES
Subjective:      Patient ID: Everton Hammond is a 59 y.o. female. Chief Complaint   Patient presents with    Fall     x 4 days trauma to left knee fell in [place] of town   fell on right knee while hiking 4 days ago- swelled and was red- has not given out on her but w certain movements has a lot of pain- no progress w weight loss- bp's well controlled- has not been to Dr. Feliciano Matson and sugars not well controlled -conts w severe susie lower leg edema no worse since injury -stills struggles w some depression      Review of Systems   Constitutional: Negative for activity change, appetite change and fatigue. HENT: Negative for congestion. Eyes: Negative for visual disturbance. Respiratory: Negative for chest tightness, shortness of breath and wheezing. Cardiovascular: Negative for chest pain and palpitations. Gastrointestinal: Negative for abdominal pain. Musculoskeletal: Positive for arthralgias, gait problem and myalgias. Negative for back pain. Skin: Negative for color change and rash. Neurological: Negative for weakness, light-headedness and headaches. Psychiatric/Behavioral: Positive for dysphoric mood. Negative for sleep disturbance. The patient is not nervous/anxious. Objective:   Physical Exam   Constitutional: She is oriented to person, place, and time. She appears well-developed and well-nourished. HENT:   Head: Normocephalic and atraumatic. Eyes: Pupils are equal, round, and reactive to light. Conjunctivae and EOM are normal.   Neck: Normal range of motion. Neck supple. Cardiovascular: Normal rate, regular rhythm and normal heart sounds. Pulmonary/Chest: Effort normal and breath sounds normal. No respiratory distress. She has no wheezes. Abdominal: She exhibits no distension. There is no tenderness. Musculoskeletal: She exhibits tenderness.    Decreased rom in knee without effusion,redness or warmth noted  ++ pain w rom      Lymphadenopathy:     She has no cervical

## 2019-04-30 ENCOUNTER — OFFICE VISIT (OUTPATIENT)
Dept: ORTHOPEDIC SURGERY | Age: 64
End: 2019-04-30
Payer: COMMERCIAL

## 2019-04-30 VITALS — BODY MASS INDEX: 45.99 KG/M2 | HEIGHT: 67 IN | WEIGHT: 293 LBS

## 2019-04-30 DIAGNOSIS — M17.11 PRIMARY OSTEOARTHRITIS OF RIGHT KNEE: ICD-10-CM

## 2019-04-30 DIAGNOSIS — S83.281A ACUTE LATERAL MENISCUS TEAR OF RIGHT KNEE, INITIAL ENCOUNTER: ICD-10-CM

## 2019-04-30 DIAGNOSIS — M25.561 RIGHT KNEE PAIN, UNSPECIFIED CHRONICITY: Primary | ICD-10-CM

## 2019-04-30 DIAGNOSIS — S83.241A ACUTE MEDIAL MENISCUS TEAR OF RIGHT KNEE, INITIAL ENCOUNTER: ICD-10-CM

## 2019-04-30 PROCEDURE — 99203 OFFICE O/P NEW LOW 30 MIN: CPT | Performed by: ORTHOPAEDIC SURGERY
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
## 2019-05-03 ENCOUNTER — HOSPITAL ENCOUNTER (OUTPATIENT)
Dept: MRI IMAGING | Age: 64
Discharge: HOME | End: 2019-05-03
Payer: COMMERCIAL

## 2019-05-03 ENCOUNTER — TELEPHONE (OUTPATIENT)
Dept: ORTHOPEDIC SURGERY | Age: 64
End: 2019-05-03

## 2019-05-03 DIAGNOSIS — M17.11 PRIMARY OSTEOARTHRITIS OF RIGHT KNEE: ICD-10-CM

## 2019-05-03 DIAGNOSIS — M25.561 RIGHT KNEE PAIN, UNSPECIFIED CHRONICITY: ICD-10-CM

## 2019-05-03 DIAGNOSIS — S83.281A ACUTE LATERAL MENISCUS TEAR OF RIGHT KNEE, INITIAL ENCOUNTER: ICD-10-CM

## 2019-05-03 DIAGNOSIS — S83.241A ACUTE MEDIAL MENISCUS TEAR OF RIGHT KNEE, INITIAL ENCOUNTER: ICD-10-CM

## 2019-05-03 DIAGNOSIS — S83.281A ACUTE LATERAL MENISCUS TEAR OF RIGHT KNEE, INITIAL ENCOUNTER: Primary | ICD-10-CM

## 2019-05-03 NOTE — TELEPHONE ENCOUNTER
Patient called because she was not able to proceed with her MRI because of claustrophobia. She wants to proceed now @ [facility]. I will arrange and call her back.      I spoke with scheduling and she now is scheduled at [facility] with IV sedation on May 9 @ 8:30am.

## 2019-05-10 DIAGNOSIS — M25.561 RIGHT KNEE PAIN, UNSPECIFIED CHRONICITY: Primary | ICD-10-CM

## 2019-05-15 ENCOUNTER — TELEPHONE (OUTPATIENT)
Dept: ORTHOPEDIC SURGERY | Age: 64
End: 2019-05-15

## 2019-05-15 NOTE — LETTER
[facility]  [address] [place] [address]  Phone: [phone]  Fax: [address] [place] [address]        May 15, 2019     Patient: Tamiko Barlow   YOB: 1955   Date of Visit: 5/15/2019       To Whom It May Concern: It is my medical opinion that Severiano Abe may return to limited participation immediately with the following restrictions of limited standing, bending and walking due to the nature of her painful knee. If you have any questions or concerns, please don't hesitate to call. Sincerely,      Malka Olszewski. Rosina Pérez M.D., F.A.C.S.

## 2019-05-20 ENCOUNTER — TELEPHONE (OUTPATIENT)
Dept: ENDOCRINOLOGY | Age: 64
End: 2019-05-20

## 2019-05-20 NOTE — TELEPHONE ENCOUNTER
Spoke to patient and advised per prev note, she said that she just took it again a little bit ago and is was down to 232 and she is starting to feel better. She said that she cant go to urgent care because there is no one to take her but if her BS start to go up again she will find a way to go.  Advised patient that she can always call 911 if she has to go

## 2019-05-20 NOTE — TELEPHONE ENCOUNTER
Patients sugar was 400 this morning after breakfast and took insulin and rechecked and it was 352. Is there anything else she can do besides insulin or another insulin that will help.   She said she has been sick lately too and she has never had it his high before       Call patient on her cell  [phone]

## 2019-05-20 NOTE — TELEPHONE ENCOUNTER
If she is feeling sick , that can cause her high glucose. She needs to go to urgent care for evaluation.   Also needs to schedule to be seen for f/u as LOV 1/19

## 2019-05-21 ENCOUNTER — OFFICE VISIT (OUTPATIENT)
Dept: INTERNAL MEDICINE CLINIC | Age: 64
End: 2019-05-21
Payer: COMMERCIAL

## 2019-05-21 VITALS
HEIGHT: 66 IN | BODY MASS INDEX: 47.09 KG/M2 | DIASTOLIC BLOOD PRESSURE: 90 MMHG | TEMPERATURE: 98.8 F | WEIGHT: 293 LBS | SYSTOLIC BLOOD PRESSURE: 170 MMHG

## 2019-05-21 DIAGNOSIS — Z79.4 TYPE 2 DIABETES MELLITUS WITH COMPLICATION, WITH LONG-TERM CURRENT USE OF INSULIN (HCC): ICD-10-CM

## 2019-05-21 DIAGNOSIS — G47.33 OBSTRUCTIVE SLEEP APNEA SYNDROME: ICD-10-CM

## 2019-05-21 DIAGNOSIS — Z01.818 PRE-OP EXAMINATION: Primary | ICD-10-CM

## 2019-05-21 DIAGNOSIS — S83.241A ACUTE MEDIAL MENISCUS TEAR OF RIGHT KNEE, INITIAL ENCOUNTER: Chronic | ICD-10-CM

## 2019-05-21 DIAGNOSIS — E66.01 CLASS 3 SEVERE OBESITY DUE TO EXCESS CALORIES WITH SERIOUS COMORBIDITY AND BODY MASS INDEX (BMI) OF 40.0 TO 44.9 IN ADULT (HCC): ICD-10-CM

## 2019-05-21 DIAGNOSIS — E11.8 TYPE 2 DIABETES MELLITUS WITH COMPLICATION, WITH LONG-TERM CURRENT USE OF INSULIN (HCC): ICD-10-CM

## 2019-05-21 DIAGNOSIS — I10 ESSENTIAL HYPERTENSION: ICD-10-CM

## 2019-05-21 PROCEDURE — 99243 OFF/OP CNSLTJ NEW/EST LOW 30: CPT | Performed by: INTERNAL MEDICINE

## 2019-05-21 PROCEDURE — 93000 ELECTROCARDIOGRAM COMPLETE: CPT | Performed by: INTERNAL MEDICINE

## 2019-05-21 RX ORDER — NEBIVOLOL 5 MG/1
5 TABLET ORAL DAILY
Qty: 30 TABLET | Refills: 5 | Status: SHIPPED | OUTPATIENT
Start: 2019-05-21 | End: 2019-07-22

## 2019-05-21 ASSESSMENT — ENCOUNTER SYMPTOMS
CHEST TIGHTNESS: 0
COLOR CHANGE: 0
BACK PAIN: 0
ABDOMINAL PAIN: 0
WHEEZING: 0

## 2019-05-21 ASSESSMENT — PATIENT HEALTH QUESTIONNAIRE - PHQ9
SUM OF ALL RESPONSES TO PHQ QUESTIONS 1-9: 1
SUM OF ALL RESPONSES TO PHQ9 QUESTIONS 1 & 2: 1
SUM OF ALL RESPONSES TO PHQ QUESTIONS 1-9: 1
1. LITTLE INTEREST OR PLEASURE IN DOING THINGS: 0
2. FEELING DOWN, DEPRESSED OR HOPELESS: 1

## 2019-05-21 NOTE — LETTER
[facility] [address]  [address], [address] [place] [id number]  Phone: [phone]  Fax: [phone]    Elisa Lyles MD        May 21, 2019       Patient: Leandra Ma   MR Number: [id number]   YOB: 1955   Date of Visit: 5/21/2019       Dear Dr. Marycruz Sagastume: Thank you for the request for consultation for York Osler to me for preoperative evaluation prior to IV sedation for MRI of the knee. Below are the relevant portions of my assessment and plan of care. Subjective:      Patient ID: Leandra Ma is a 59 y.o. female. Chief Complaint   Patient presents with    Pre-op Exam     MRI (SEDATATION)        IN FOR PREOP PRIOR TO IV SEDATION BEFORE mri- bs's have been up- just placed on increased insulin and to see Dr Francy Nino in July- bp's also out of control in the 160-170 range- no progress w wt loss- knee pain persists and seeing Dr. Alla Pat has had sleep studies even though at great risk for sleep apnea     Leandra Ma  1955    Allergies   Allergen Reactions    Actos [Pioglitazone Hydrochloride] Nausea Only    Metformin Nausea Only    Sulfa Antibiotics Swelling     Current Outpatient Medications   Medication Sig Dispense Refill    nebivolol (BYSTOLIC) 5 MG tablet Take 1 tablet by mouth daily 30 tablet 5    meloxicam (MOBIC) 15 MG tablet Take 1 tablet by mouth daily Does NOT need 90 d supply 30 tablet 1    losartan (COZAAR) 100 MG tablet TAKE 1 TABLET DAILY 90 tablet 4    montelukast (SINGULAIR) 10 MG tablet Take 1 tablet by mouth nightly 90 tablet 3    insulin regular human (HUMULIN R U-500 KWIKPEN) 500 UNIT/ML SOPN concentrated injection pen 80 units before breakfast and dinner 18 pen 2    promethazine-codeine (PHENERGAN WITH CODEINE) 6.25-10 MG/5ML syrup Take 5 mLs by mouth every 4 hours as needed for Cough. . 240 mL 1    hydrochlorothiazide (MICROZIDE) 12.5 MG capsule Take 1 capsule by mouth daily 90 capsule 3  FLUoxetine (PROZAC) 40 MG capsule TAKE 1 CAPSULE DAILY 90 capsule 3    atorvastatin (LIPITOR) 40 MG tablet Take 1 tablet by mouth daily 90 tablet 1    mometasone (ELOCON) 0.1 % ointment Apply topically daily. 1 Tube 1    busPIRone (BUSPAR) 15 MG tablet Take 15 mg by mouth 3 times daily as needed (anxiety) 60 tablet 3    verapamil (CALAN SR) 240 MG extended release tablet Take 1 tablet by mouth nightly 90 tablet 3    Insulin Pen Needle 32G X 4 MM MISC 1 each by Does not apply route daily 100 each 3    Continuous Blood Gluc  (FREESTYLE MING READER) YOANDY Use as needed 1 Device 0    Continuous Blood Gluc Sensor (FREESTYLE MING SENSOR SYSTEM) MISC Every 10 days 3 each 2    loratadine (CLARITIN) 10 MG tablet Take 10 mg by mouth daily      NORETHINDRONE PO Take 10 mg by mouth nightly      ONE TOUCH LANCETS MISC 1 each by Does not apply route daily 100 each 3    gabapentin (NEURONTIN) 100 MG capsule Take 1 capsule by mouth 4 times daily for 59 days. . 120 capsule 5     No current facility-administered medications for this visit. Vitals:    05/21/19 1527   BP: (!) 170/90   Temp: 98.8 °F (37.1 °C)   Weight: (!) 341 lb (154.7 kg)   Height: 5' 5.5\" (1.664 m)     Body mass index is 55.88 kg/m².      Wt Readings from Last 3 Encounters:   05/21/19 (!) 341 lb (154.7 kg)   04/30/19 (!) 340 lb (154.2 kg)   04/17/19 (!) 339 lb (153.8 kg)     BP Readings from Last 3 Encounters:   05/21/19 (!) 170/90   04/17/19 (!) 158/68   01/15/19 (!) 180/90         Immunization History   Administered Date(s) Administered    Influenza Virus Vaccine 09/26/2015    Influenza, Intradermal, Quadrivalent, Preservative Free 12/08/2016, 11/29/2017    Influenza, [place], 3 yrs and older, IM, PF (Fluzone 3 yrs and older or Afluria 5 yrs and older) 09/29/2018    Pneumococcal 13-valent Conjugate ([id number]) 09/26/2015    Pneumococcal Conjugate 7-valent 06/05/2007    Pneumococcal Polysaccharide ([id number]) 09/29/2018  Tdap (Boostrix, Adacel) 2011    Tetanus 1999       Past Medical History:   Diagnosis Date    Anxiety and depression     Asthma     Edema     GERD (gastroesophageal reflux disease)     Hyperlipidemia     Hypertension     IBS (irritable bowel syndrome)     Morbid obesity ([address]     Multinodular goiter 2010    Personal history of colonic polyps     Dr. Marisol Real- due for repeat colonoscopy     Positive PPD, treated     INH completed 2001    S/P cholecystectomy 2011    Type II or unspecified type diabetes mellitus without mention of complication, not stated as uncontrolled      Past Surgical History:   Procedure Laterality Date     SECTION N/A 30 plus years ago    x2    CHOLECYSTECTOMY      3/2009    COLONOSCOPY      2012 Dr. Huan Vallejo 5 yrs    COLONOSCOPY      repeat x 3 yrs HAD 5 POLPYS    HYSTEROSCOPY  2015    HYSTEROSCOPY, DILATATION AND CURETTAGE WITH NOVASURE ABLATION AND ABLATION    MYOMECTOMY      2003    TIBIA FRACTURE SURGERY      2006 w/ revision     No family history on file.   Social History     Socioeconomic History    Marital status:      Spouse name: Not on file    Number of children: Not on file    Years of education: Not on file    Highest education level: Not on file   Occupational History    Not on file   Social Needs    Financial resource strain: Not on file    Food insecurity:     Worry: Not on file     Inability: Not on file    Transportation needs:     Medical: Not on file     Non-medical: Not on file   Tobacco Use    Smoking status: Former Smoker     Packs/day: 0.50     Years: 10.00     Pack years: 5.00     Types: Cigarettes     Last attempt to quit: 1995     Years since quittin.0    Smokeless tobacco: Never Used   Substance and Sexual Activity    Alcohol use: Yes     Comment: once a month    Drug use: No    Sexual activity: Not on file   Lifestyle    Physical activity: Days per week: Not on file     Minutes per session: Not on file    Stress: Not on file   Relationships    Social connections:     Talks on phone: Not on file     Gets together: Not on file     Attends Jehovah's witness service: Not on file     Active member of club or organization: Not on file     Attends meetings of clubs or organizations: Not on file     Relationship status: Not on file    Intimate partner violence:     Fear of current or ex partner: Not on file     Emotionally abused: Not on file     Physically abused: Not on file     Forced sexual activity: Not on file   Other Topics Concern    Not on file   Social History Narrative    Not on file             Review of Systems   Constitutional: Negative for activity change, appetite change and fatigue. HENT: Negative for congestion. Eyes: Negative for visual disturbance. Respiratory: Negative for chest tightness and wheezing. Cardiovascular: Negative for chest pain and palpitations. Gastrointestinal: Negative for abdominal pain. Musculoskeletal: Positive for arthralgias, gait problem and joint swelling. Negative for back pain. Skin: Negative for color change and rash. Neurological: Negative for weakness, light-headedness and headaches. Psychiatric/Behavioral: Positive for dysphoric mood. Negative for sleep disturbance. The patient is nervous/anxious. Objective:   Physical Exam   Constitutional: She is oriented to person, place, and time. She appears well-developed and well-nourished. HENT:   Head: Normocephalic and atraumatic. Eyes: Pupils are equal, round, and reactive to light. Conjunctivae and EOM are normal.   Neck: Normal range of motion. Neck supple. Cardiovascular: Normal rate, regular rhythm and normal heart sounds. Pulmonary/Chest: Effort normal and breath sounds normal. No respiratory distress. Abdominal: She exhibits no distension. There is no tenderness. Musculoskeletal: She exhibits tenderness. Decreased rom in knee without effusion,redness or warmth noted  ++ pain w rom      Lymphadenopathy:     She has no cervical adenopathy. Neurological: She is alert and oriented to person, place, and time. Skin: Skin is warm and dry. Psychiatric: She has a normal mood and affect. Her behavior is normal. Judgment and thought content normal.         Assessment and Plan:      Acute medial meniscus tear of right knee  For MRI to determine etiology of pain    Diabetes mellitus ([facility] [address]  Poorly controlled- cont to increase insulin and f/u w endo    Obese  Discussed with patient at length health risks of obesity and need for diet and exercise      Hypertension  OOC- add bystolic and follow but will not effect procedure     Obstructive sleep apnea syndrome  Still hasn't done sleep studies          Patient has been seen-history and physical performed and is medically cleared for surgery. If you have questions, please do not hesitate to call me. I look forward to following González Ruiz along with you.     Sincerely,        Cletis Frankel, MD    CC providers:  No Recipients

## 2019-05-21 NOTE — PATIENT INSTRUCTIONS
START BYSTOLIC-CONTINUE ALL OTHER MEDS  IF IN 1 WEEK THE BP IS STILL ABOVE 140/80 AND AS LONG AS THE PULSE IS ABOVE 65, INCREASE THE BYSTOLIC TO 2 TABS DAILY  CALL IN 2-3 WEEKS W BP READINGS AND PULSE

## 2019-05-21 NOTE — COMMUNICATION BODY
mellitus without mention of complication, not stated as uncontrolled      Past Surgical History:   Procedure Laterality Date     SECTION N/A 30 plus years ago    x2    CHOLECYSTECTOMY      3/2009    COLONOSCOPY      2012 Dr. Filemon Wakefield 5 yrs    COLONOSCOPY      repeat x 3 yrs HAD 5 POLPYS    HYSTEROSCOPY  2015    HYSTEROSCOPY, DILATATION AND CURETTAGE WITH NOVASURE ABLATION AND ABLATION    MYOMECTOMY      2003    TIBIA FRACTURE SURGERY      2006 w/ revision     No family history on file.   Social History     Socioeconomic History    Marital status:      Spouse name: Not on file    Number of children: Not on file    Years of education: Not on file    Highest education level: Not on file   Occupational History    Not on file   Social Needs    Financial resource strain: Not on file    Food insecurity:     Worry: Not on file     Inability: Not on file    Transportation needs:     Medical: Not on file     Non-medical: Not on file   Tobacco Use    Smoking status: Former Smoker     Packs/day: 0.50     Years: 10.00     Pack years: 5.00     Types: Cigarettes     Last attempt to quit: 1995     Years since quittin.0    Smokeless tobacco: Never Used   Substance and Sexual Activity    Alcohol use: Yes     Comment: once a month    Drug use: No    Sexual activity: Not on file   Lifestyle    Physical activity:     Days per week: Not on file     Minutes per session: Not on file    Stress: Not on file   Relationships    Social connections:     Talks on phone: Not on file     Gets together: Not on file     Attends Yarsanism service: Not on file     Active member of club or organization: Not on file     Attends meetings of clubs or organizations: Not on file     Relationship status: Not on file    Intimate partner violence:     Fear of current or ex partner: Not on file     Emotionally abused: Not on file     Physically abused: Not on file     Forced sexual activity: Not

## 2019-05-21 NOTE — PROGRESS NOTES
Subjective:      Patient ID: Claude Na is a 59 y.o. female. Chief Complaint   Patient presents with    Pre-op Exam     MRI (SEDATATION)        IN FOR PREOP PRIOR TO IV SEDATION BEFORE mri- bs's have been up- just placed on increased insulin and to see Dr Bill Gauthier in July- bp's also out of control in the 160-170 range- no progress w wt loss- knee pain persists and seeing Dr. Darline Yeager has had sleep studies even though at great risk for sleep apnea     Claude Na  1955    Allergies   Allergen Reactions    Actos [Pioglitazone Hydrochloride] Nausea Only    Metformin Nausea Only    Sulfa Antibiotics Swelling     Current Outpatient Medications   Medication Sig Dispense Refill    nebivolol (BYSTOLIC) 5 MG tablet Take 1 tablet by mouth daily 30 tablet 5    meloxicam (MOBIC) 15 MG tablet Take 1 tablet by mouth daily Does NOT need 90 d supply 30 tablet 1    losartan (COZAAR) 100 MG tablet TAKE 1 TABLET DAILY 90 tablet 4    montelukast (SINGULAIR) 10 MG tablet Take 1 tablet by mouth nightly 90 tablet 3    insulin regular human (HUMULIN R U-500 KWIKPEN) 500 UNIT/ML SOPN concentrated injection pen 80 units before breakfast and dinner 18 pen 2    promethazine-codeine (PHENERGAN WITH CODEINE) 6.25-10 MG/5ML syrup Take 5 mLs by mouth every 4 hours as needed for Cough. . 240 mL 1    hydrochlorothiazide (MICROZIDE) 12.5 MG capsule Take 1 capsule by mouth daily 90 capsule 3    FLUoxetine (PROZAC) 40 MG capsule TAKE 1 CAPSULE DAILY 90 capsule 3    atorvastatin (LIPITOR) 40 MG tablet Take 1 tablet by mouth daily 90 tablet 1    mometasone (ELOCON) 0.1 % ointment Apply topically daily.  1 Tube 1    busPIRone (BUSPAR) 15 MG tablet Take 15 mg by mouth 3 times daily as needed (anxiety) 60 tablet 3    verapamil (CALAN SR) 240 MG extended release tablet Take 1 tablet by mouth nightly 90 tablet 3    Insulin Pen Needle 32G X 4 MM MISC 1 each by Does not apply route daily 100 each 3    Continuous Blood Gluc  ([employer] MING READER) YOANDY Use as needed 1 Device 0    Continuous Blood Gluc Sensor (FREESTYLE MING SENSOR SYSTEM) MISC Every 10 days 3 each 2    loratadine (CLARITIN) 10 MG tablet Take 10 mg by mouth daily      NORETHINDRONE PO Take 10 mg by mouth nightly      ONE TOUCH LANCETS MISC 1 each by Does not apply route daily 100 each 3    gabapentin (NEURONTIN) 100 MG capsule Take 1 capsule by mouth 4 times daily for 59 days. . 120 capsule 5     No current facility-administered medications for this visit. Vitals:    05/21/19 1527   BP: (!) 170/90   Temp: 98.8 °F (37.1 °C)   Weight: (!) 341 lb (154.7 kg)   Height: 5' 5.5\" (1.664 m)     Body mass index is 55.88 kg/m².      Wt Readings from Last 3 Encounters:   05/21/19 (!) 341 lb (154.7 kg)   04/30/19 (!) 340 lb (154.2 kg)   04/17/19 (!) 339 lb (153.8 kg)     BP Readings from Last 3 Encounters:   05/21/19 (!) 170/90   04/17/19 (!) 158/68   01/15/19 (!) 180/90         Immunization History   Administered Date(s) Administered    Influenza Virus Vaccine 09/26/2015    Influenza, Intradermal, Quadrivalent, Preservative Free 12/08/2016, 11/29/2017    Influenza, [place], 3 yrs and older, IM, PF (Fluzone 3 yrs and older or Afluria 5 yrs and older) 09/29/2018    Pneumococcal 13-valent Conjugate ([id number]) 09/26/2015    Pneumococcal Conjugate 7-valent 06/05/2007    Pneumococcal Polysaccharide ([id number]) 09/29/2018    Tdap (Boostrix, Adacel) 06/04/2011    Tetanus 07/07/1999       Past Medical History:   Diagnosis Date    Anxiety and depression     Asthma     Edema     GERD (gastroesophageal reflux disease)     Hyperlipidemia     Hypertension     IBS (irritable bowel syndrome)     Morbid obesity ([facility] [address]     Multinodular goiter 5/22/2010    Personal history of colonic polyps     Dr. Rubio Body- due for repeat colonoscopy 2012    Positive PPD, treated     INH completed 11/2001    S/P cholecystectomy 9/17/2011    Type II or unspecified type diabetes mellitus without mention of complication, not stated as uncontrolled      Past Surgical History:   Procedure Laterality Date     SECTION N/A 30 plus years ago    x2    CHOLECYSTECTOMY      3/2009    COLONOSCOPY      2012 Dr. Huan Vallejo 5 yrs    COLONOSCOPY      repeat x 3 yrs HAD 5 POLPYS    HYSTEROSCOPY  2015    HYSTEROSCOPY, DILATATION AND CURETTAGE WITH NOVASURE ABLATION AND ABLATION    MYOMECTOMY      2003    TIBIA FRACTURE SURGERY      2006 w/ revision     No family history on file.   Social History     Socioeconomic History    Marital status:      Spouse name: Not on file    Number of children: Not on file    Years of education: Not on file    Highest education level: Not on file   Occupational History    Not on file   Social Needs    Financial resource strain: Not on file    Food insecurity:     Worry: Not on file     Inability: Not on file    Transportation needs:     Medical: Not on file     Non-medical: Not on file   Tobacco Use    Smoking status: Former Smoker     Packs/day: 0.50     Years: 10.00     Pack years: 5.00     Types: Cigarettes     Last attempt to quit: 1995     Years since quittin.0    Smokeless tobacco: Never Used   Substance and Sexual Activity    Alcohol use: Yes     Comment: once a month    Drug use: No    Sexual activity: Not on file   Lifestyle    Physical activity:     Days per week: Not on file     Minutes per session: Not on file    Stress: Not on file   Relationships    Social connections:     Talks on phone: Not on file     Gets together: Not on file     Attends Yazidism service: Not on file     Active member of club or organization: Not on file     Attends meetings of clubs or organizations: Not on file     Relationship status: Not on file    Intimate partner violence:     Fear of current or ex partner: Not on file     Emotionally abused: Not on file     Physically abused: Not on file     Forced sexual activity: Not on file   Other Topics Concern    Not on file   Social History Narrative    Not on file             Review of Systems   Constitutional: Negative for activity change, appetite change and fatigue. HENT: Negative for congestion. Eyes: Negative for visual disturbance. Respiratory: Negative for chest tightness and wheezing. Cardiovascular: Negative for chest pain and palpitations. Gastrointestinal: Negative for abdominal pain. Musculoskeletal: Positive for arthralgias, gait problem and joint swelling. Negative for back pain. Skin: Negative for color change and rash. Neurological: Negative for weakness, light-headedness and headaches. Psychiatric/Behavioral: Positive for dysphoric mood. Negative for sleep disturbance. The patient is nervous/anxious. Objective:   Physical Exam   Constitutional: She is oriented to person, place, and time. She appears well-developed and well-nourished. HENT:   Head: Normocephalic and atraumatic. Eyes: Pupils are equal, round, and reactive to light. Conjunctivae and EOM are normal.   Neck: Normal range of motion. Neck supple. Cardiovascular: Normal rate, regular rhythm and normal heart sounds. Pulmonary/Chest: Effort normal and breath sounds normal. No respiratory distress. Abdominal: She exhibits no distension. There is no tenderness. Musculoskeletal: She exhibits tenderness. Decreased rom in knee without effusion,redness or warmth noted  ++ pain w rom      Lymphadenopathy:     She has no cervical adenopathy. Neurological: She is alert and oriented to person, place, and time. Skin: Skin is warm and dry. Psychiatric: She has a normal mood and affect.  Her behavior is normal. Judgment and thought content normal.         Assessment and Plan:      Acute medial meniscus tear of right knee  For MRI to determine etiology of pain    Diabetes mellitus ([address]  Poorly controlled- cont to increase insulin and f/u w endo    Obese  Discussed with patient at length health risks of obesity and need for diet and exercise      Hypertension  OOC- add bystolic and follow but will not effect procedure     Obstructive sleep apnea syndrome  Still hasn't done sleep studies          Patient has been seen-history and physical performed and is medically cleared for surgery.

## 2019-05-24 ENCOUNTER — ANESTHESIA (OUTPATIENT)
Dept: MRI IMAGING | Age: 64
End: 2019-05-24

## 2019-05-24 ENCOUNTER — ANESTHESIA EVENT (OUTPATIENT)
Dept: MRI IMAGING | Age: 64
End: 2019-05-24

## 2019-05-24 ENCOUNTER — HOSPITAL ENCOUNTER (OUTPATIENT)
Dept: MRI IMAGING | Age: 64
Discharge: HOME | End: 2019-05-24
Payer: COMMERCIAL

## 2019-05-24 VITALS
HEIGHT: 66 IN | HEART RATE: 69 BPM | TEMPERATURE: 97.2 F | WEIGHT: 293 LBS | BODY MASS INDEX: 47.09 KG/M2 | SYSTOLIC BLOOD PRESSURE: 127 MMHG | DIASTOLIC BLOOD PRESSURE: 52 MMHG | RESPIRATION RATE: 14 BRPM | OXYGEN SATURATION: 96 %

## 2019-05-24 VITALS — SYSTOLIC BLOOD PRESSURE: 174 MMHG | OXYGEN SATURATION: 98 % | DIASTOLIC BLOOD PRESSURE: 71 MMHG

## 2019-05-24 DIAGNOSIS — S83.241A ACUTE MEDIAL MENISCUS TEAR OF RIGHT KNEE, INITIAL ENCOUNTER: ICD-10-CM

## 2019-05-24 DIAGNOSIS — S83.281A ACUTE LATERAL MENISCUS TEAR OF RIGHT KNEE, INITIAL ENCOUNTER: ICD-10-CM

## 2019-05-24 DIAGNOSIS — M25.561 RIGHT KNEE PAIN, UNSPECIFIED CHRONICITY: ICD-10-CM

## 2019-05-24 DIAGNOSIS — M17.11 PRIMARY OSTEOARTHRITIS OF RIGHT KNEE: ICD-10-CM

## 2019-05-24 LAB
GLUCOSE BLD-MCNC: 140 MG/DL (ref 70–99)
GLUCOSE BLD-MCNC: 180 MG/DL (ref 70–99)
PERFORMED ON: ABNORMAL
PERFORMED ON: ABNORMAL

## 2019-05-24 PROCEDURE — 2500000003 HC RX 250 WO HCPCS: Performed by: ANESTHESIOLOGY

## 2019-05-24 PROCEDURE — 73721 MRI JNT OF LWR EXTRE W/O DYE: CPT

## 2019-05-24 PROCEDURE — 6360000002 HC RX W HCPCS: Performed by: ANESTHESIOLOGY

## 2019-05-24 PROCEDURE — 3700000001 HC ADD 15 MINUTES (ANESTHESIA)

## 2019-05-24 PROCEDURE — 7100000011 HC PHASE II RECOVERY - ADDTL 15 MIN

## 2019-05-24 PROCEDURE — 2580000003 HC RX 258: Performed by: ANESTHESIOLOGY

## 2019-05-24 PROCEDURE — 6360000002 HC RX W HCPCS: Performed by: NURSE ANESTHETIST, CERTIFIED REGISTERED

## 2019-05-24 PROCEDURE — 2580000003 HC RX 258: Performed by: NURSE ANESTHETIST, CERTIFIED REGISTERED

## 2019-05-24 PROCEDURE — 7100000010 HC PHASE II RECOVERY - FIRST 15 MIN

## 2019-05-24 PROCEDURE — 3700000000 HC ANESTHESIA ATTENDED CARE

## 2019-05-24 RX ORDER — MEPERIDINE HYDROCHLORIDE 25 MG/ML
12.5 INJECTION INTRAMUSCULAR; INTRAVENOUS; SUBCUTANEOUS EVERY 5 MIN PRN
Status: DISCONTINUED | OUTPATIENT
Start: 2019-05-24 | End: 2019-05-25 | Stop reason: HOSPADM

## 2019-05-24 RX ORDER — HYDRALAZINE HYDROCHLORIDE 20 MG/ML
5 INJECTION INTRAMUSCULAR; INTRAVENOUS EVERY 10 MIN PRN
Status: DISCONTINUED | OUTPATIENT
Start: 2019-05-24 | End: 2019-05-25 | Stop reason: HOSPADM

## 2019-05-24 RX ORDER — OXYCODONE HYDROCHLORIDE AND ACETAMINOPHEN 5; 325 MG/1; MG/1
1 TABLET ORAL ONCE
Status: DISCONTINUED | OUTPATIENT
Start: 2019-05-24 | End: 2019-05-25 | Stop reason: HOSPADM

## 2019-05-24 RX ORDER — FENTANYL CITRATE 50 UG/ML
25 INJECTION, SOLUTION INTRAMUSCULAR; INTRAVENOUS EVERY 5 MIN PRN
Status: DISCONTINUED | OUTPATIENT
Start: 2019-05-24 | End: 2019-05-25 | Stop reason: HOSPADM

## 2019-05-24 RX ORDER — SODIUM CHLORIDE, SODIUM LACTATE, POTASSIUM CHLORIDE, CALCIUM CHLORIDE 600; 310; 30; 20 MG/100ML; MG/100ML; MG/100ML; MG/100ML
INJECTION, SOLUTION INTRAVENOUS CONTINUOUS PRN
Status: DISCONTINUED | OUTPATIENT
Start: 2019-05-24 | End: 2019-05-24 | Stop reason: SDUPTHER

## 2019-05-24 RX ORDER — MIDAZOLAM HYDROCHLORIDE 1 MG/ML
2 INJECTION INTRAMUSCULAR; INTRAVENOUS EVERY 5 MIN PRN
Status: DISCONTINUED | OUTPATIENT
Start: 2019-05-24 | End: 2019-05-25 | Stop reason: HOSPADM

## 2019-05-24 RX ORDER — 0.9 % SODIUM CHLORIDE 0.9 %
500 INTRAVENOUS SOLUTION INTRAVENOUS
Status: ACTIVE | OUTPATIENT
Start: 2019-05-24 | End: 2019-05-24

## 2019-05-24 RX ORDER — SODIUM CHLORIDE, SODIUM LACTATE, POTASSIUM CHLORIDE, CALCIUM CHLORIDE 600; 310; 30; 20 MG/100ML; MG/100ML; MG/100ML; MG/100ML
INJECTION, SOLUTION INTRAVENOUS CONTINUOUS
Status: DISCONTINUED | OUTPATIENT
Start: 2019-05-24 | End: 2019-05-25 | Stop reason: HOSPADM

## 2019-05-24 RX ORDER — ONDANSETRON 2 MG/ML
4 INJECTION INTRAMUSCULAR; INTRAVENOUS EVERY 30 MIN PRN
Status: DISCONTINUED | OUTPATIENT
Start: 2019-05-24 | End: 2019-05-25 | Stop reason: HOSPADM

## 2019-05-24 RX ORDER — FENTANYL CITRATE 50 UG/ML
INJECTION, SOLUTION INTRAMUSCULAR; INTRAVENOUS PRN
Status: DISCONTINUED | OUTPATIENT
Start: 2019-05-24 | End: 2019-05-24 | Stop reason: SDUPTHER

## 2019-05-24 RX ORDER — LABETALOL 20 MG/4 ML (5 MG/ML) INTRAVENOUS SYRINGE
5 EVERY 10 MIN PRN
Status: DISCONTINUED | OUTPATIENT
Start: 2019-05-24 | End: 2019-05-25 | Stop reason: HOSPADM

## 2019-05-24 RX ORDER — DIPHENHYDRAMINE HYDROCHLORIDE 50 MG/ML
12.5 INJECTION INTRAMUSCULAR; INTRAVENOUS
Status: ACTIVE | OUTPATIENT
Start: 2019-05-24 | End: 2019-05-24

## 2019-05-24 RX ORDER — ONDANSETRON 2 MG/ML
INJECTION INTRAMUSCULAR; INTRAVENOUS PRN
Status: DISCONTINUED | OUTPATIENT
Start: 2019-05-24 | End: 2019-05-24 | Stop reason: SDUPTHER

## 2019-05-24 RX ORDER — MIDAZOLAM HYDROCHLORIDE 1 MG/ML
INJECTION INTRAMUSCULAR; INTRAVENOUS PRN
Status: DISCONTINUED | OUTPATIENT
Start: 2019-05-24 | End: 2019-05-24 | Stop reason: SDUPTHER

## 2019-05-24 RX ORDER — GLYCOPYRROLATE 0.2 MG/ML
0.1 INJECTION INTRAMUSCULAR; INTRAVENOUS ONCE
Status: COMPLETED | OUTPATIENT
Start: 2019-05-24 | End: 2019-05-24

## 2019-05-24 RX ORDER — PROMETHAZINE HYDROCHLORIDE 25 MG/ML
6.25 INJECTION, SOLUTION INTRAMUSCULAR; INTRAVENOUS
Status: ACTIVE | OUTPATIENT
Start: 2019-05-24 | End: 2019-05-24

## 2019-05-24 RX ADMIN — MIDAZOLAM HYDROCHLORIDE 1 MG: 2 INJECTION, SOLUTION INTRAMUSCULAR; INTRAVENOUS at 13:36

## 2019-05-24 RX ADMIN — SODIUM CHLORIDE, POTASSIUM CHLORIDE, SODIUM LACTATE AND CALCIUM CHLORIDE: 600; 310; 30; 20 INJECTION, SOLUTION INTRAVENOUS at 12:04

## 2019-05-24 RX ADMIN — MIDAZOLAM HYDROCHLORIDE 1 MG: 2 INJECTION, SOLUTION INTRAMUSCULAR; INTRAVENOUS at 13:48

## 2019-05-24 RX ADMIN — GLYCOPYRROLATE 0.1 MG: 0.2 INJECTION, SOLUTION INTRAMUSCULAR; INTRAVENOUS at 12:24

## 2019-05-24 RX ADMIN — FENTANYL CITRATE 25 MCG: 50 INJECTION, SOLUTION INTRAMUSCULAR; INTRAVENOUS at 13:44

## 2019-05-24 RX ADMIN — MIDAZOLAM HYDROCHLORIDE 2 MG: 2 INJECTION, SOLUTION INTRAMUSCULAR; INTRAVENOUS at 12:05

## 2019-05-24 RX ADMIN — SODIUM CHLORIDE, SODIUM LACTATE, POTASSIUM CHLORIDE, AND CALCIUM CHLORIDE: 600; 310; 30; 20 INJECTION, SOLUTION INTRAVENOUS at 13:36

## 2019-05-24 RX ADMIN — MIDAZOLAM HYDROCHLORIDE 1 MG: 2 INJECTION, SOLUTION INTRAMUSCULAR; INTRAVENOUS at 13:44

## 2019-05-24 RX ADMIN — ONDANSETRON 4 MG: 2 INJECTION INTRAMUSCULAR; INTRAVENOUS at 13:44

## 2019-05-24 ASSESSMENT — PAIN - FUNCTIONAL ASSESSMENT: PAIN_FUNCTIONAL_ASSESSMENT: 0-10

## 2019-05-24 ASSESSMENT — PAIN SCALES - GENERAL: PAINLEVEL_OUTOF10: 0

## 2019-05-24 NOTE — PROGRESS NOTES
Ambulatory Surgery/Procedure Discharge Note  Pt alert and stable  Fluids and food taken well   Up to bathroom with help , voided qs amt  No nausea   Minimal pain in knee ,tolerable for discharge  Verbal and written discharge instructions given to pt and family   dcd per wheelchair to car with family present    Vitals:    05/24/19 1450   BP: (!) 127/52   Pulse: 69   Resp: 14   Temp:    SpO2: 96%       No intake/output data recorded. Restroom use offered before discharge. Yes    Pain assessment:  level of pain (1-10, 10 severe),   Pain Level: 0        Patient discharged to home/self care.  Patient discharged via wheel chair by transporter to waiting family/S.O.       5/24/2019 3:45 PM

## 2019-05-24 NOTE — ANESTHESIA POSTPROCEDURE EVALUATION
Department of Anesthesiology  Postprocedure Note    Patient: Benjamín Lanier  MRN: [id number]  YOB: 1955  Date of evaluation: 5/24/2019  Time:  3:20 PM     Procedure Summary     Date:  05/24/19 Room / Location:  [facility]    Anesthesia Start:  1336 Anesthesia Stop:  1895    Procedure:  MRI KNEE RIGHT WO CONTRAST Diagnosis:       Acute lateral meniscus tear of right knee, initial encounter      Acute medial meniscus tear of right knee, initial encounter      Primary osteoarthritis of right knee      Right knee pain, unspecified chronicity      (KNEE PAIN)    Scheduled Providers:   Responsible Provider:  Kiara Us MD    Anesthesia Type:  general ASA Status:  3          Anesthesia Type: general    Johann Phase I: Johann Score: 10    Johann Phase II:      Last vitals: Reviewed and per EMR flowsheets. Anesthesia Post Evaluation    Patient location during evaluation: PACU  Patient participation: complete - patient participated  Level of consciousness: awake and alert  Pain scale: please refer to nursing notes. Airway patency: patent  Nausea & Vomiting: no nausea and no vomiting  Complications: no  Cardiovascular status: hemodynamically stable  Respiratory status: spontaneous ventilation  Hydration status: stable  Comments: No phone calls received from PACU RN regarding patient.

## 2019-05-24 NOTE — PROGRESS NOTES
Pt arrived to pacu from MRI A&O x4, VSS, pt did not receive anesthesia placed directly in phase 2, report received from CRNA on record

## 2019-05-24 NOTE — ANESTHESIA PRE PROCEDURE
Department of Anesthesiology  Preprocedure Note       Name:  Benjamín Lanier   Age:  59 y.o.  :  1955                                          MRN:  [id number]         Date:  2019      Surgeon: * No surgeons listed *    Procedure: MRI KNEE RIGHT WO CONTRAST    Medications prior to admission:   Prior to Admission medications    Medication Sig Start Date End Date Taking? Authorizing Provider   nebivolol (BYSTOLIC) 5 MG tablet Take 1 tablet by mouth daily 19   Tess Moffett MD   meloxicam (MOBIC) 15 MG tablet Take 1 tablet by mouth daily Does NOT need 90 d supply 19   Tess Moffett MD   losartan (COZAAR) 100 MG tablet TAKE 1 TABLET DAILY 19   Tess Moffett MD   montelukast (SINGULAIR) 10 MG tablet Take 1 tablet by mouth nightly 19   Tess Moffett MD   gabapentin (NEURONTIN) 100 MG capsule Take 1 capsule by mouth 4 times daily for 59 days. . 1/28/19 3/28/19  Tess Moffett MD   insulin regular human (HUMULIN R U-500 KWIKPEN) 500 UNIT/ML SOPN concentrated injection pen 80 units before breakfast and dinner 1/15/19   Renetta Clay MD   promethazine-[facility] WITH CODEINE) 6.25-10 MG/5ML syrup Take 5 mLs by mouth every 4 hours as needed for Cough. . 19  Tess Moffett MD   hydrochlorothiazide (MICROZIDE) 12.5 MG capsule Take 1 capsule by mouth daily 18   Tess Moffett MD   FLUoxetine (PROZAC) 40 MG capsule TAKE 1 CAPSULE DAILY 18   Tess Moffett MD   atorvastatin (LIPITOR) 40 MG tablet Take 1 tablet by mouth daily 18   Tess Moffett MD   mometasone (ELOCON) 0.1 % ointment Apply topically daily.  10/5/18   Tess Moffett MD   busPIRone (BUSPAR) 15 MG tablet Take 15 mg by mouth 3 times daily as needed (anxiety) 18   Tess Moffett MD   verapamil (CALAN SR) 240 MG extended release tablet Take 1 tablet by mouth nightly 18   Tess Moffett MD   Insulin Pen Needle 32G X 4 MM MISC 1 each by Does not apply route daily 3/20/18   Renetta Clay MD MING READER) YOANDY Use as needed 1 Device 0    Continuous Blood Gluc Sensor (FREESTYLE MING SENSOR SYSTEM) MISC Every 10 days 3 each 2    loratadine (CLARITIN) 10 MG tablet Take 10 mg by mouth daily      NORETHINDRONE PO Take 10 mg by mouth nightly      ONE TOUCH LANCETS MISC 1 each by Does not apply route daily 100 each 3     No current facility-administered medications for this encounter. Allergies: Allergies   Allergen Reactions    Actos [Pioglitazone Hydrochloride] Nausea Only    Metformin Nausea Only    Sulfa Antibiotics Swelling       Problem List:    Patient Active Problem List   Diagnosis Code    Diabetes mellitus ([facility] [address] E11.9    Edema R60.9    IBS (irritable bowel syndrome) K58.9    Arthritis M19.90    Hypertension I10    Allergic rhinitis J30.9    Obstructive sleep apnea syndrome G47.33    Obese E66.9    Fibroid D21.9    Colon polyps K63.5    Depression F32.9    Anxiety F41.9    GERD (gastroesophageal reflux disease) K21.9    Vitamin D deficiency E55.9    Irregular menstrual bleeding N92.6    Insomnia G47.00    Urinary incontinence R32    Chronic bilateral low back pain without sciatica M54.5, G89.29    Venous stasis dermatitis of both lower extremities I87.2    Multiple thyroid nodules E04.2    Pure hypercholesterolemia E78.00    Chronic pain of both knees M25.561, M25.562, G89.29    Bilateral ankle pain M25.571, M25.572    Urinary frequency R35.0    Bilateral hand numbness R20.0    Plantar fasciitis M72.2    Right knee injury S89. 91XA    Primary osteoarthritis of right knee M17.11    Acute medial meniscus tear of right knee S83.241A    Acute lateral meniscus tear of right knee S83.281A       Past Medical History:        Diagnosis Date    Anxiety and depression     Asthma     Edema     GERD (gastroesophageal reflux disease)     Hyperlipidemia     Hypertension     IBS (irritable bowel syndrome)     Morbid obesity (HCC)     Multinodular goiter 2010    Personal history of colonic polyps     Dr. Ruben Carranza- due for repeat colonoscopy     Positive PPD, treated     INH completed 2001    S/P cholecystectomy 2011    Type II or unspecified type diabetes mellitus without mention of complication, not stated as uncontrolled        Past Surgical History:        Procedure Laterality Date     SECTION N/A 30 plus years ago    x2    CHOLECYSTECTOMY      3/2009    COLONOSCOPY      2012 Dr. Clarence Carroll 5 yrs    COLONOSCOPY      repeat x 3 yrs HAD 5 POLPYS    HYSTEROSCOPY  2015    HYSTEROSCOPY, DILATATION AND CURETTAGE WITH NOVASURE ABLATION AND ABLATION    MYOMECTOMY      2003    TIBIA FRACTURE SURGERY      2006 w/ revision       Social History:    Social History     Tobacco Use    Smoking status: Former Smoker     Packs/day: 0.50     Years: 10.00     Pack years: 5.00     Types: Cigarettes     Last attempt to quit: 1995     Years since quittin.0    Smokeless tobacco: Never Used   Substance Use Topics    Alcohol use: Yes     Comment: once a month                                Counseling given: Not Answered      Vital Signs (Current):   Vitals:    19 1124   BP: (!) 179/76   Pulse: 80   Resp: 18   Temp: 98.6 °F (37 °C)   TempSrc: Oral   SpO2: 99%                                              BP Readings from Last 3 Encounters:   19 (!) 179/76   19 (!) 170/90   19 (!) 158/68       NPO Status:                                                                                 BMI:   Wt Readings from Last 3 Encounters:   19 (!) 341 lb (154.7 kg)   19 (!) 340 lb (154.2 kg)   19 (!) 339 lb (153.8 kg)     There is no height or weight on file to calculate BMI.    CBC:   Lab Results   Component Value Date    WBC 10.3 01/15/2019    RBC 4.91 01/15/2019    HGB 14.5 01/15/2019    HCT 43.9 01/15/2019    MCV 89.5 01/15/2019    RDW 14.0 01/15/2019     01/15/2019       CMP:   Lab Results Component Value Date     01/15/2019    K 4.1 01/15/2019    CL 98 01/15/2019    CO2 29 01/15/2019    BUN 8 01/15/2019    CREATININE 0.7 01/15/2019    GFRAA >60 01/15/2019    GFRAA >60 01/19/2013    AGRATIO 1.3 01/15/2019    LABGLOM >60 01/15/2019    GLUCOSE 127 01/15/2019    GLUCOSE 156 11/28/2011    PROT 7.7 01/15/2019    PROT 7.0 01/19/2013    CALCIUM 9.7 01/15/2019    BILITOT 0.3 01/15/2019    ALKPHOS 148 01/15/2019    AST 12 01/15/2019    ALT 14 01/15/2019       POC Tests: No results for input(s): POCGLU, POCNA, POCK, POCCL, POCBUN, POCHEMO, POCHCT in the last 72 hours. Coags: No results found for: PROTIME, INR, APTT    HCG (If Applicable): No results found for: PREGTESTUR, PREGSERUM, HCG, HCGQUANT     ABGs: No results found for: PHART, PO2ART, XLR3HHJ, ZNE2OSR, BEART, M0VZRPOJ     Type & Screen (If Applicable):  No results found for: LABABO, [address]    Anesthesia Evaluation  Patient summary reviewed no history of anesthetic complications:   Airway: Mallampati: III  TM distance: >3 FB   Neck ROM: full  Mouth opening: > = 3 FB Dental:    (+) caps      Pulmonary:   (+) sleep apnea: on CPAP,  asthma:                            Cardiovascular:    (+) hypertension:,                   Neuro/Psych:               GI/Hepatic/Renal:   (+) GERD:, morbid obesity          Endo/Other:    (+) DiabetesType II DM, , .                 Abdominal:   (+) obese,         Vascular: negative vascular ROS. Anesthesia Plan      general     ASA 3       Induction: intravenous. Anesthetic plan and risks discussed with patient. Plan discussed with CRNA.     Attending anesthesiologist reviewed and agrees with Gary Lowry MD   5/24/2019

## 2019-05-28 ENCOUNTER — OFFICE VISIT (OUTPATIENT)
Dept: ORTHOPEDIC SURGERY | Age: 64
End: 2019-05-28
Payer: COMMERCIAL

## 2019-05-28 DIAGNOSIS — M17.11 PRIMARY OSTEOARTHRITIS OF RIGHT KNEE: Primary | ICD-10-CM

## 2019-05-28 DIAGNOSIS — M25.561 RIGHT KNEE PAIN, UNSPECIFIED CHRONICITY: ICD-10-CM

## 2019-05-28 DIAGNOSIS — S83.241D ACUTE MEDIAL MENISCUS TEAR OF RIGHT KNEE, SUBSEQUENT ENCOUNTER: Chronic | ICD-10-CM

## 2019-05-28 PROCEDURE — 99212 OFFICE O/P EST SF 10 MIN: CPT | Performed by: ORTHOPAEDIC SURGERY

## 2019-05-28 PROCEDURE — 20610 DRAIN/INJ JOINT/BURSA W/O US: CPT | Performed by: ORTHOPAEDIC SURGERY

## 2019-05-28 NOTE — PROGRESS NOTES
[address] [facility] # [id number]    PRODUCT CODE [id number]    LOT# [id number]    EXP DATE 5/28/19    SITE INJECTED RIGHT KNEE

## 2019-05-28 NOTE — PROGRESS NOTES
Her MRI scan of her right knee reveals no lateral meniscus tear. She does have irregular contour and free edge of the meniscus body to suggest fraying and degeneration with may be a very small radial tear. There is no large fragment tear noted. She does have primary osteoarthritis particularly in the lateral compartment and she was also noted to have patellofemoral osteoarthritis on radiographs. Review of systems from April 30, 2019 unchanged and in the chart    At this point her major complaint is soreness and aching with some catching particularly around the patellofemoral joint. She's not having any major locking sensation. We talked her about the options of treatment including arthroscopic intervention versus trying viscous supplementation 1st with appropriate strength exercises and physical therapy. She would rather do the latter approach for now knowing that she could always go back to arthroscopic intervention. We talked about the risks and benefits and she understood that. We talked about the use of ice and the use of anti-inflammatory medications.     I spent 10+ minutes with the patient including 5+ minutes face to face with the patient discussing and answering questions regarding their osteoarthritis lateral compartment and patellofemoral compartment with questionable small radial medial meniscus tear

## 2019-05-29 ENCOUNTER — TELEPHONE (OUTPATIENT)
Dept: INTERNAL MEDICINE CLINIC | Age: 64
End: 2019-05-29

## 2019-05-29 RX ORDER — METOPROLOL SUCCINATE 50 MG/1
50 TABLET, EXTENDED RELEASE ORAL NIGHTLY
Qty: 30 TABLET | Refills: 3 | Status: SHIPPED | OUTPATIENT
Start: 2019-05-29 | End: 2021-05-10 | Stop reason: ALTCHOICE

## 2019-05-29 NOTE — TELEPHONE ENCOUNTER
That's strange as the computer says bystolic is preferred w her insurance- I sent in metoprolol instead-tell pt we need to add something for the bp as it was WAY too high- if metoprolol is not allowed have her check her medication formulary or ask her pharmacist which bblocker medication is covered

## 2019-05-29 NOTE — TELEPHONE ENCOUNTER
Patient would like to speak with MA regarding Bystolic medication. Insurance did not cover medication and wanted to discuss alternatives.     Please advise

## 2019-05-30 ENCOUNTER — TELEPHONE (OUTPATIENT)
Dept: ORTHOPEDIC SURGERY | Age: 64
End: 2019-05-30

## 2019-06-07 ENCOUNTER — OFFICE VISIT (OUTPATIENT)
Dept: ORTHOPEDIC SURGERY | Age: 64
End: 2019-06-07
Payer: COMMERCIAL

## 2019-06-07 DIAGNOSIS — M17.11 PRIMARY OSTEOARTHRITIS OF RIGHT KNEE: Primary | ICD-10-CM

## 2019-06-07 PROCEDURE — 20610 DRAIN/INJ JOINT/BURSA W/O US: CPT | Performed by: ORTHOPAEDIC SURGERY

## 2019-06-07 NOTE — PROGRESS NOTES
C/O left chest and upper throat pain after ambulating to bathroom.  EKG obtained, pain relieved after 3 sl ntg.  Dr Wright notified, see orders.  Pt states she has experienced chest pain/discomfort a couple of times since hospitalization, but has taken own sl ntg.  Instructed pt to call nurse with c/o chest pain and do not take any medications from home with explanation of the importance of monitoring medications and monitoring her care.  Pt and  verbalized understanding   DIAGNOSIS: Osteoarthritis, Chondromalacia in the right knee. HISTORY OF PRESENT ILLNESS: The patient is here for the second orthovisc injection into the right knee. PROCEDURE: Under sterile conditions, the patient was injected in the superolateral pouch of the right knee with orthovisc prefilled syringe  with a 22-gauge needle. The injection was tolerated well. Post-injection precautions were given. PLAN: The patient will return back in one week for the third injection of the orthovisc series.

## 2019-06-18 ENCOUNTER — OFFICE VISIT (OUTPATIENT)
Dept: ORTHOPEDIC SURGERY | Age: 64
End: 2019-06-18
Payer: COMMERCIAL

## 2019-06-18 DIAGNOSIS — M17.11 PRIMARY OSTEOARTHRITIS OF RIGHT KNEE: Primary | Chronic | ICD-10-CM

## 2019-06-18 PROCEDURE — 20610 DRAIN/INJ JOINT/BURSA W/O US: CPT | Performed by: ORTHOPAEDIC SURGERY

## 2019-06-18 RX ORDER — MELOXICAM 15 MG/1
15 TABLET ORAL DAILY
Qty: 90 TABLET | Refills: 3 | Status: SHIPPED | OUTPATIENT
Start: 2019-06-18 | End: 2019-12-31

## 2019-06-18 NOTE — PROGRESS NOTES
The patient returns today for their third Orthovisc for diagnosis of osteoarthritis. The risks, benefits, and complications of the injections were again discussed in detail with the patient. The risks discussed included but are not limited to infection, skin reactions, hot swollen joints, and anaphylaxis. The patient gave verbal informed consent for the injection. The patient skin was prepped with iodine and sterile 4x4 gauze pad and the right knee joint was injected with 2 ml of Orthovisc intra-articularly under sterile conditions  into the supra-lateral pouch. The patient tolerated the injection reasonably well. The patient was given instructions to ice the right knee and avoid strenuous activities for 24-48 hours. The patient was instructed to call the office immediately if there is increased pain, redness, warmth, fever, or chills. We will see the patient back in 3-months for a follow-up. Patient would like to try some type of a stabilizer brace and we will try and see if we can fit her for a hinged sleeve brace. She will also start meloxicam 50 mg p.o. daily with food and obvious precautions.

## 2019-06-26 ENCOUNTER — TELEPHONE (OUTPATIENT)
Dept: INTERNAL MEDICINE CLINIC | Age: 64
End: 2019-06-26

## 2019-06-26 NOTE — TELEPHONE ENCOUNTER
Pt called and stated ask she was given herself her injection the needle broke and the needle remains in her stomach. Dr Maranda Joyner is and was aware of this matter and stated to send pt to the er. . Pt stated that wasnt what she wanted to hear but she agreed to go. ..just a fyi- pls advise if needed  -also pt stated she would need another pen called in

## 2019-06-26 NOTE — TELEPHONE ENCOUNTER
CALLED PATIENT BACK Left message on machine   WAS JUST CHECKING ON SITUATION  PEN RX E-SCRIBED TODAY. CALL BACK IF NEEDED.

## 2019-07-10 ENCOUNTER — HOSPITAL ENCOUNTER (OUTPATIENT)
Dept: PHYSICAL THERAPY | Age: 64
Setting detail: THERAPIES SERIES
Discharge: HOME | End: 2019-07-10
Payer: COMMERCIAL

## 2019-07-10 PROCEDURE — 97161 PT EVAL LOW COMPLEX 20 MIN: CPT | Performed by: PHYSICAL THERAPIST

## 2019-07-10 PROCEDURE — 97110 THERAPEUTIC EXERCISES: CPT | Performed by: PHYSICAL THERAPIST

## 2019-07-10 NOTE — FLOWSHEET NOTE
proximal hip, and core control with self care, mobility, lifting, ambulation.  [] (15717) Provided verbal/tactile cueing for activities related to improving balance, coordination, kinesthetic sense, posture, motor skill, proprioception  to assist with LE, proximal hip, and core control in self care, mobility, lifting, ambulation and eccentric single leg control. NMR and Therapeutic Activities:    [] (90320 or 12582) Provided verbal/tactile cueing for activities related to improving balance, coordination, kinesthetic sense, posture, motor skill, proprioception and motor activation to allow for proper function of core, proximal hip and LE with self care and ADLs  [] (17403) Gait Re-education- Provided training and instruction to the patient for proper LE, core and proximal hip recruitment and positioning and eccentric body weight control with ambulation re-education including up and down stairs     Home Exercise Program:    [x] (72925) Reviewed/Progressed HEP activities related to strengthening, flexibility, endurance, ROM of core, proximal hip and LE for functional self-care, mobility, lifting and ambulation/stair navigation   [] (43377)Reviewed/Progressed HEP activities related to improving balance, coordination, kinesthetic sense, posture, motor skill, proprioception of core, proximal hip and LE for self care, mobility, lifting, and ambulation/stair navigation      Manual Treatments:  PROM / Scar Mobs / STM/Rollerstick / Knee (Flex./Ext.)  Stretch: H.S. / ITB / Piriformis / Teretha Clothier / Groin / Hip Flexor   [] (06657) Provided manual therapy to mobilize LE, proximal hip and/or LS spine soft tissue/joints for the purpose of modulating pain, promoting relaxation,  increasing ROM, reducing/eliminating soft tissue swelling/inflammation/restriction, improving soft tissue extensibility and allowing for proper ROM for normal function with self care, mobility, lifting and ambulation.      Modalities:  CP

## 2019-07-10 NOTE — PLAN OF CARE
History:see above  Functional Disability Index:LEFS: 66%  G-Codes  The patient demonstrates at least 60% but less than 70% impairment, limitation or restriction in:   - walking and moving around (mobility)     Pain Scale: 1-3/10  Easing factors: ice, rest  Provocative factors: stand, walk, stairs, position changes     Type: []Constant   []Intermittent  []Radiating []Localized []other:     Numbness/Tingling: none    Functional Limitations/Impairments: []Sitting [x]Standing [x]Walking    [x]Squatting/bending  [x]Stairs           []ADL's  [x]Transfers []Sports/Recreation []Other:    Occupation/School:   Office work (desk job)    Living Status/Prior Level of Function: Independent with ADLs and IADLs  (insert highest prior level of function)    OBJECTIVE:     ROM LEFT RIGHT   HIP Flex     HIP Abd     HIP Ext     HIP IR     HIP ER     Knee ext -3 -13   Knee Flex 120 110   Ankle PF     Ankle DF     Ankle In     Ankle Ev     Strength  LEFT RIGHT   HIP Flexors     HIP Abductors 4 3+   HIP Ext     Hip ER     Knee EXT (quad) 5 4   Knee Flex (HS) 5 5   Ankle DF     Ankle PF     Ankle Inv     Ankle EV          Circumference  Mid  7 cm       LE Dermatomes     LE myotomes       Single Leg Squat: n/a  Single Leg Stance: n/a    Joint mobility:    []Normal    [x]Hypo   []Hyper    Palpation: lateral joint line of knee    Functional Mobility/Transfers: slow, labored table mobility    Posture: mild valgus of knee    Bandages/Dressings/Incisions: n/a    Gait: (include devices/WB status) antalgic gait with decreased heel strike, weight shift and stance time on RLE    Orthopedic Special Tests: n/a                       [x] Patient history, allergies, meds reviewed. Medical chart reviewed. See intake form. Review Of Systems (ROS):  [x]Performed Review of systems (Integumentary, CardioPulmonary, Neurological) by intake and observation. Intake form has been scanned into medical record.  Patient has been instructed to contact their primary

## 2019-07-12 RX ORDER — VERAPAMIL HYDROCHLORIDE 240 MG/1
TABLET, FILM COATED, EXTENDED RELEASE ORAL
Qty: 90 TABLET | Refills: 3 | Status: SHIPPED | OUTPATIENT
Start: 2019-07-12 | End: 2020-07-28

## 2019-07-19 ENCOUNTER — TELEPHONE (OUTPATIENT)
Dept: INTERNAL MEDICINE CLINIC | Age: 64
End: 2019-07-19

## 2019-07-19 DIAGNOSIS — Z79.4 TYPE 2 DIABETES MELLITUS WITH OTHER CIRCULATORY COMPLICATION, WITH LONG-TERM CURRENT USE OF INSULIN (HCC): Primary | ICD-10-CM

## 2019-07-19 DIAGNOSIS — E11.59 TYPE 2 DIABETES MELLITUS WITH OTHER CIRCULATORY COMPLICATION, WITH LONG-TERM CURRENT USE OF INSULIN (HCC): Primary | ICD-10-CM

## 2019-07-22 ENCOUNTER — OFFICE VISIT (OUTPATIENT)
Dept: ENDOCRINOLOGY | Age: 64
End: 2019-07-22
Payer: COMMERCIAL

## 2019-07-22 VITALS
HEIGHT: 67 IN | SYSTOLIC BLOOD PRESSURE: 166 MMHG | WEIGHT: 293 LBS | HEART RATE: 89 BPM | OXYGEN SATURATION: 98 % | RESPIRATION RATE: 16 BRPM | BODY MASS INDEX: 45.99 KG/M2 | DIASTOLIC BLOOD PRESSURE: 82 MMHG

## 2019-07-22 DIAGNOSIS — Z79.4 TYPE 2 DIABETES MELLITUS WITH COMPLICATION, WITH LONG-TERM CURRENT USE OF INSULIN (HCC): Primary | ICD-10-CM

## 2019-07-22 DIAGNOSIS — E11.8 TYPE 2 DIABETES MELLITUS WITH COMPLICATION, WITH LONG-TERM CURRENT USE OF INSULIN (HCC): Primary | ICD-10-CM

## 2019-07-22 LAB — HBA1C MFR BLD: 9.3 %

## 2019-07-22 PROCEDURE — 99214 OFFICE O/P EST MOD 30 MIN: CPT | Performed by: INTERNAL MEDICINE

## 2019-07-22 PROCEDURE — 83036 HEMOGLOBIN GLYCOSYLATED A1C: CPT | Performed by: INTERNAL MEDICINE

## 2019-07-23 RX ORDER — BETAMETHASONE DIPROPIONATE 0.05 %
OINTMENT (GRAM) TOPICAL
Qty: 90 G | Refills: 3 | Status: SHIPPED | OUTPATIENT
Start: 2019-07-23 | End: 2021-11-23 | Stop reason: SDUPTHER

## 2019-07-24 ENCOUNTER — HOSPITAL ENCOUNTER (OUTPATIENT)
Dept: PHYSICAL THERAPY | Age: 64
Setting detail: THERAPIES SERIES
Discharge: HOME | End: 2019-07-24
Payer: COMMERCIAL

## 2019-07-24 PROCEDURE — 97110 THERAPEUTIC EXERCISES: CPT | Performed by: SPECIALIST/TECHNOLOGIST

## 2019-07-24 NOTE — FLOWSHEET NOTE
[facility] [address] [place] [address] [facility]    Physical Therapy Daily Treatment Note  Date:  2019    Patient Name:  Tae Lassiter    :  1955  MRN: [id number]  Restrictions/Precautions:    Medical/Treatment Diagnosis Information:  · Diagnosis: Right knee OA   M17.11  · Treatment Diagnosis: PT treatment diagnosis:  right knee pain, stiffness, weakness  Insurance/Certification information:   [employer]  30 visits/yr,  $0 copay  Physician Information:  Referring Practitioner: Dr Faizan Richards of care signed (Y/N):     Date of Patient follow up with Physician:     G-Code (if applicable):      Date G-Code Applied: The patient demonstrates at least 60% but less than 70% impairment, limitation or restriction in:   - walking and moving around (mobility)     Progress Note: [x]  Yes  []  No  Next due by: Visit #10       Latex Allergy:  [x]NO      []YES  Preferred Language for Healthcare:   [x]English       []other:    Visit # Insurance Allowable   2 30     Auth Required   []  Yes    [] No    Visits Approved  Date Ranged-       Pain level:  1-4/10     SUBJECTIVE:  Pt. Reports she fell on Monday 7/15/19 and her knees landed of the concrete. Pt. Reports no significant changes since last PT visit. OBJECTIVE:    Observation: Pt. Reports being non-compliant with HEP. = 19. Pt. Did not come to PT between 7/10/19 - 19   Test measurements:      RESTRICTIONS/PRECAUTIONS: DM, OA, HTN, previous tibial plateau fracture    Exercises/Interventions: Rx 19  MD 9/10/19    Exercise Sets/Reps Notes Last Progression   Gastroc Stretch 3x30'' belt    Heelslides       LLLD Wallslide      HS Stretch:  Tableside  3x30''     Heel prop 3'     SAQ X 20 New     LAQ      SLR Flex 2x10 TC    SLR Abd 2x10 TC     SLR Ext      SLR Add.       Clamshells - SL X 15 - RLE New     Bridges 2 x 10 New     HR X 20 New     Standing hip abd X 20 B New     BAPS      Bike      Elliptical      Standing Stretch: (insert muscles)      Weight Shifting      Lateral Walk      Squats      Single Leg Stance Balance                            Therapeutic Exercise and NMR EXR  [x] (01507) Provided verbal/tactile cueing for activities related to strengthening, flexibility, endurance, ROM for improvements in LE, proximal hip, and core control with self care, mobility, lifting, ambulation.  [] (83258) Provided verbal/tactile cueing for activities related to improving balance, coordination, kinesthetic sense, posture, motor skill, proprioception  to assist with LE, proximal hip, and core control in self care, mobility, lifting, ambulation and eccentric single leg control.      NMR and Therapeutic Activities:    [] (24944 or 61006) Provided verbal/tactile cueing for activities related to improving balance, coordination, kinesthetic sense, posture, motor skill, proprioception and motor activation to allow for proper function of core, proximal hip and LE with self care and ADLs  [] (52446) Gait Re-education- Provided training and instruction to the patient for proper LE, core and proximal hip recruitment and positioning and eccentric body weight control with ambulation re-education including up and down stairs     Home Exercise Program:    [x] (39918) Reviewed/Progressed HEP activities related to strengthening, flexibility, endurance, ROM of core, proximal hip and LE for functional self-care, mobility, lifting and ambulation/stair navigation   [] (06396)Reviewed/Progressed HEP activities related to improving balance, coordination, kinesthetic sense, posture, motor skill, proprioception of core, proximal hip and LE for self care, mobility, lifting, and ambulation/stair navigation      Manual Treatments:  PROM / Scar Mobs / STM/Rollerstick / Knee (Flex./Ext.)  Stretch: H.S. / ITB / Piriformis / Quad / Groin / Hip Flexor   [] (90088) Provided manual therapy to mobilize LE, proximal hip and/or LS spine soft tissue/joints for the purpose of

## 2019-07-25 ENCOUNTER — TELEPHONE (OUTPATIENT)
Dept: INTERNAL MEDICINE CLINIC | Age: 64
End: 2019-07-25

## 2019-07-25 DIAGNOSIS — R85.618 OTHER ABNORMAL CYTOLOGICAL FINDINGS ON SPECIMENS FROM ANUS: Primary | ICD-10-CM

## 2019-07-26 ENCOUNTER — TELEPHONE (OUTPATIENT)
Dept: ENDOCRINOLOGY | Age: 64
End: 2019-07-26

## 2019-07-26 NOTE — TELEPHONE ENCOUNTER
[facility] calling to get a script filled for a 90 day supply of trulicity pen.      Ref# [id number]    [phone] marlyn

## 2019-07-31 ENCOUNTER — APPOINTMENT (OUTPATIENT)
Dept: PHYSICAL THERAPY | Age: 64
End: 2019-07-31
Payer: COMMERCIAL

## 2019-07-31 ENCOUNTER — HOSPITAL ENCOUNTER (OUTPATIENT)
Dept: PHYSICAL THERAPY | Age: 64
Setting detail: THERAPIES SERIES
Discharge: HOME | End: 2019-07-31
Payer: COMMERCIAL

## 2019-10-24 ENCOUNTER — NURSE ONLY (OUTPATIENT)
Dept: INTERNAL MEDICINE CLINIC | Age: 64
End: 2019-10-24
Payer: COMMERCIAL

## 2019-10-24 DIAGNOSIS — Z23 NEED FOR INFLUENZA VACCINATION: Primary | ICD-10-CM

## 2019-10-24 PROCEDURE — 90471 IMMUNIZATION ADMIN: CPT | Performed by: INTERNAL MEDICINE

## 2019-10-24 PROCEDURE — 90686 IIV4 VACC NO PRSV 0.5 ML IM: CPT | Performed by: INTERNAL MEDICINE

## 2019-12-13 ENCOUNTER — OFFICE VISIT (OUTPATIENT)
Dept: INTERNAL MEDICINE CLINIC | Age: 64
End: 2019-12-13
Payer: COMMERCIAL

## 2019-12-13 VITALS
HEIGHT: 67 IN | DIASTOLIC BLOOD PRESSURE: 76 MMHG | WEIGHT: 293 LBS | SYSTOLIC BLOOD PRESSURE: 179 MMHG | BODY MASS INDEX: 45.99 KG/M2

## 2019-12-13 DIAGNOSIS — G47.33 OBSTRUCTIVE SLEEP APNEA SYNDROME: ICD-10-CM

## 2019-12-13 DIAGNOSIS — E66.01 CLASS 3 SEVERE OBESITY DUE TO EXCESS CALORIES WITH SERIOUS COMORBIDITY AND BODY MASS INDEX (BMI) OF 40.0 TO 44.9 IN ADULT (HCC): ICD-10-CM

## 2019-12-13 DIAGNOSIS — E11.59 TYPE 2 DIABETES MELLITUS WITH OTHER CIRCULATORY COMPLICATION, WITH LONG-TERM CURRENT USE OF INSULIN (HCC): ICD-10-CM

## 2019-12-13 DIAGNOSIS — Z79.4 TYPE 2 DIABETES MELLITUS WITH OTHER CIRCULATORY COMPLICATION, WITH LONG-TERM CURRENT USE OF INSULIN (HCC): ICD-10-CM

## 2019-12-13 DIAGNOSIS — R32 URINARY INCONTINENCE, UNSPECIFIED TYPE: ICD-10-CM

## 2019-12-13 DIAGNOSIS — I10 ESSENTIAL HYPERTENSION: ICD-10-CM

## 2019-12-13 DIAGNOSIS — F32.A DEPRESSION, UNSPECIFIED DEPRESSION TYPE: ICD-10-CM

## 2019-12-13 DIAGNOSIS — Z00.00 WELL ADULT EXAM: Primary | ICD-10-CM

## 2019-12-13 PROCEDURE — 99396 PREV VISIT EST AGE 40-64: CPT | Performed by: INTERNAL MEDICINE

## 2019-12-13 RX ORDER — BUPROPION HYDROCHLORIDE 300 MG/1
300 TABLET ORAL EVERY MORNING
Qty: 30 TABLET | Refills: 3 | Status: SHIPPED | OUTPATIENT
Start: 2019-12-13 | End: 2020-01-07 | Stop reason: SDUPTHER

## 2019-12-13 RX ORDER — CHLORTHALIDONE 25 MG/1
25 TABLET ORAL DAILY
Qty: 30 TABLET | Refills: 3 | Status: SHIPPED | OUTPATIENT
Start: 2019-12-13 | End: 2020-01-07 | Stop reason: SDUPTHER

## 2019-12-13 SDOH — ECONOMIC STABILITY: FOOD INSECURITY: WITHIN THE PAST 12 MONTHS, THE FOOD YOU BOUGHT JUST DIDN'T LAST AND YOU DIDN'T HAVE MONEY TO GET MORE.: PATIENT DECLINED

## 2019-12-13 SDOH — ECONOMIC STABILITY: TRANSPORTATION INSECURITY
IN THE PAST 12 MONTHS, HAS LACK OF TRANSPORTATION KEPT YOU FROM MEETINGS, WORK, OR FROM GETTING THINGS NEEDED FOR DAILY LIVING?: PATIENT DECLINED

## 2019-12-13 SDOH — ECONOMIC STABILITY: FOOD INSECURITY: WITHIN THE PAST 12 MONTHS, YOU WORRIED THAT YOUR FOOD WOULD RUN OUT BEFORE YOU GOT MONEY TO BUY MORE.: PATIENT DECLINED

## 2019-12-13 SDOH — ECONOMIC STABILITY: TRANSPORTATION INSECURITY
IN THE PAST 12 MONTHS, HAS THE LACK OF TRANSPORTATION KEPT YOU FROM MEDICAL APPOINTMENTS OR FROM GETTING MEDICATIONS?: PATIENT DECLINED

## 2019-12-13 ASSESSMENT — ENCOUNTER SYMPTOMS
ABDOMINAL PAIN: 0
BACK PAIN: 0
CHEST TIGHTNESS: 0
WHEEZING: 0
COLOR CHANGE: 0

## 2019-12-31 ENCOUNTER — OFFICE VISIT (OUTPATIENT)
Dept: ENDOCRINOLOGY | Age: 64
End: 2019-12-31
Payer: COMMERCIAL

## 2019-12-31 VITALS
HEART RATE: 80 BPM | HEIGHT: 67 IN | WEIGHT: 293 LBS | SYSTOLIC BLOOD PRESSURE: 218 MMHG | OXYGEN SATURATION: 97 % | DIASTOLIC BLOOD PRESSURE: 108 MMHG | BODY MASS INDEX: 45.99 KG/M2

## 2019-12-31 DIAGNOSIS — Z79.4 TYPE 2 DIABETES MELLITUS WITH COMPLICATION, WITH LONG-TERM CURRENT USE OF INSULIN (HCC): Primary | ICD-10-CM

## 2019-12-31 DIAGNOSIS — E11.8 TYPE 2 DIABETES MELLITUS WITH COMPLICATION, WITH LONG-TERM CURRENT USE OF INSULIN (HCC): Primary | ICD-10-CM

## 2019-12-31 DIAGNOSIS — I10 ESSENTIAL HYPERTENSION: ICD-10-CM

## 2019-12-31 LAB — HBA1C MFR BLD: 10.5 %

## 2019-12-31 PROCEDURE — 99214 OFFICE O/P EST MOD 30 MIN: CPT | Performed by: INTERNAL MEDICINE

## 2019-12-31 PROCEDURE — 83036 HEMOGLOBIN GLYCOSYLATED A1C: CPT | Performed by: INTERNAL MEDICINE

## 2020-01-02 RX ORDER — FLUOXETINE HYDROCHLORIDE 40 MG/1
CAPSULE ORAL
Qty: 90 CAPSULE | Refills: 3 | Status: SHIPPED | OUTPATIENT
Start: 2020-01-02 | End: 2021-03-01

## 2020-01-02 RX ORDER — ATORVASTATIN CALCIUM 40 MG/1
TABLET, FILM COATED ORAL
Qty: 90 TABLET | Refills: 3 | Status: SHIPPED | OUTPATIENT
Start: 2020-01-02 | End: 2021-01-22

## 2020-01-07 ENCOUNTER — TELEPHONE (OUTPATIENT)
Dept: INTERNAL MEDICINE CLINIC | Age: 65
End: 2020-01-07

## 2020-01-07 RX ORDER — BUSPIRONE HYDROCHLORIDE 15 MG/1
15 TABLET ORAL 3 TIMES DAILY PRN
Qty: 90 TABLET | Refills: 3 | Status: SHIPPED | OUTPATIENT
Start: 2020-01-07 | End: 2021-06-17

## 2020-01-07 RX ORDER — CHLORTHALIDONE 25 MG/1
25 TABLET ORAL DAILY
Qty: 30 TABLET | Refills: 3 | Status: SHIPPED | OUTPATIENT
Start: 2020-01-07 | End: 2020-07-28

## 2020-01-07 RX ORDER — BUPROPION HYDROCHLORIDE 300 MG/1
300 TABLET ORAL EVERY MORNING
Qty: 90 TABLET | Refills: 3 | Status: SHIPPED | OUTPATIENT
Start: 2020-01-07 | End: 2021-05-10 | Stop reason: ALTCHOICE

## 2020-01-21 ENCOUNTER — TELEPHONE (OUTPATIENT)
Dept: INTERNAL MEDICINE CLINIC | Age: 65
End: 2020-01-21

## 2020-01-21 RX ORDER — CLONIDINE HYDROCHLORIDE 0.1 MG/1
0.1 TABLET ORAL EVERY 8 HOURS
Qty: 60 TABLET | Refills: 1 | Status: SHIPPED | OUTPATIENT
Start: 2020-01-21 | End: 2020-03-04 | Stop reason: SDUPTHER

## 2020-01-22 ENCOUNTER — TELEPHONE (OUTPATIENT)
Dept: INTERNAL MEDICINE CLINIC | Age: 65
End: 2020-01-22

## 2020-01-24 DIAGNOSIS — Z79.4 TYPE 2 DIABETES MELLITUS WITH COMPLICATION, WITH LONG-TERM CURRENT USE OF INSULIN (HCC): ICD-10-CM

## 2020-01-24 DIAGNOSIS — I10 ESSENTIAL HYPERTENSION: ICD-10-CM

## 2020-01-24 DIAGNOSIS — E11.8 TYPE 2 DIABETES MELLITUS WITH COMPLICATION, WITH LONG-TERM CURRENT USE OF INSULIN (HCC): ICD-10-CM

## 2020-01-27 LAB
ALDOSTERONE: 7.3 NG/DL
RENIN ACTIVITY: 0.4 NG/ML/HR

## 2020-02-01 ENCOUNTER — OFFICE VISIT (OUTPATIENT)
Dept: INTERNAL MEDICINE CLINIC | Age: 65
End: 2020-02-01
Payer: COMMERCIAL

## 2020-02-01 VITALS
SYSTOLIC BLOOD PRESSURE: 138 MMHG | DIASTOLIC BLOOD PRESSURE: 82 MMHG | BODY MASS INDEX: 45.99 KG/M2 | WEIGHT: 293 LBS | HEIGHT: 67 IN

## 2020-02-01 PROBLEM — S83.281A ACUTE LATERAL MENISCUS TEAR OF RIGHT KNEE: Chronic | Status: RESOLVED | Noted: 2019-04-30 | Resolved: 2020-02-01

## 2020-02-01 PROCEDURE — 99214 OFFICE O/P EST MOD 30 MIN: CPT | Performed by: INTERNAL MEDICINE

## 2020-02-01 ASSESSMENT — ENCOUNTER SYMPTOMS
CHEST TIGHTNESS: 0
COLOR CHANGE: 0
BACK PAIN: 0
WHEEZING: 0
ABDOMINAL PAIN: 0

## 2020-02-01 NOTE — PROGRESS NOTES
Subjective:      Patient ID: Josué Tello is a 62 y.o. female. Chief Complaint   Patient presents with    Hypertension     b/p check, b/s 210 this week (a1c needed) albs needed     bp's much better controlled!!! Feeling ok- taking all of her meds and catapres not making her sleepy- trying to cut back on her carbs- A1C was up but has lost more than 10#'s recently and working on her intake-still has knee pain     Review of Systems   Constitutional: Negative for activity change, appetite change and fatigue. HENT: Negative for congestion. Eyes: Negative for visual disturbance. Respiratory: Negative for chest tightness and wheezing. Cardiovascular: Negative for chest pain and palpitations. Gastrointestinal: Negative for abdominal pain. Musculoskeletal: Positive for arthralgias and myalgias. Negative for back pain. Skin: Negative for color change and rash. Neurological: Negative for weakness, light-headedness and headaches. Psychiatric/Behavioral: Positive for dysphoric mood. Negative for sleep disturbance. The patient is nervous/anxious. Objective:   Physical Exam  Constitutional:       Appearance: She is well-developed. HENT:      Head: Normocephalic and atraumatic. Eyes:      Conjunctiva/sclera: Conjunctivae normal.      Pupils: Pupils are equal, round, and reactive to light. Neck:      Musculoskeletal: Normal range of motion and neck supple. Cardiovascular:      Rate and Rhythm: Normal rate and regular rhythm. Heart sounds: Normal heart sounds. Pulmonary:      Effort: Pulmonary effort is normal. No respiratory distress. Breath sounds: Normal breath sounds. Abdominal:      General: There is no distension. Tenderness: There is no abdominal tenderness. Lymphadenopathy:      Cervical: No cervical adenopathy. Skin:     General: Skin is warm and dry. Neurological:      Mental Status: She is alert and oriented to person, place, and time.    Psychiatric:

## 2020-03-04 RX ORDER — CLONIDINE HYDROCHLORIDE 0.1 MG/1
0.1 TABLET ORAL EVERY 8 HOURS
Qty: 180 TABLET | Refills: 3 | Status: SHIPPED | OUTPATIENT
Start: 2020-03-04 | End: 2020-03-06 | Stop reason: SDUPTHER

## 2020-03-06 RX ORDER — CLONIDINE HYDROCHLORIDE 0.1 MG/1
0.1 TABLET ORAL EVERY 8 HOURS
Qty: 270 TABLET | Refills: 3 | Status: SHIPPED | OUTPATIENT
Start: 2020-03-06 | End: 2021-07-07 | Stop reason: SDUPTHER

## 2020-03-11 ENCOUNTER — TELEPHONE (OUTPATIENT)
Dept: INTERNAL MEDICINE CLINIC | Age: 65
End: 2020-03-11

## 2020-03-14 DIAGNOSIS — E78.00 PURE HYPERCHOLESTEROLEMIA: ICD-10-CM

## 2020-03-14 DIAGNOSIS — E11.59 TYPE 2 DIABETES MELLITUS WITH OTHER CIRCULATORY COMPLICATION, WITH LONG-TERM CURRENT USE OF INSULIN (HCC): ICD-10-CM

## 2020-03-14 DIAGNOSIS — K63.5 POLYP OF COLON, UNSPECIFIED PART OF COLON, UNSPECIFIED TYPE: ICD-10-CM

## 2020-03-14 DIAGNOSIS — I10 ESSENTIAL HYPERTENSION: ICD-10-CM

## 2020-03-14 DIAGNOSIS — Z79.4 TYPE 2 DIABETES MELLITUS WITH OTHER CIRCULATORY COMPLICATION, WITH LONG-TERM CURRENT USE OF INSULIN (HCC): ICD-10-CM

## 2020-03-14 DIAGNOSIS — E55.9 VITAMIN D DEFICIENCY: ICD-10-CM

## 2020-03-14 DIAGNOSIS — E66.01 CLASS 3 SEVERE OBESITY DUE TO EXCESS CALORIES WITH SERIOUS COMORBIDITY AND BODY MASS INDEX (BMI) OF 40.0 TO 44.9 IN ADULT (HCC): ICD-10-CM

## 2020-03-14 LAB
A/G RATIO: 1.3 (ref 1.1–2.2)
ALBUMIN SERPL-MCNC: 3.8 G/DL (ref 3.4–5)
ALP BLD-CCNC: 157 U/L (ref 40–129)
ALT SERPL-CCNC: 15 U/L (ref 10–40)
ANION GAP SERPL CALCULATED.3IONS-SCNC: 13 MMOL/L (ref 3–16)
AST SERPL-CCNC: 12 U/L (ref 15–37)
BASOPHILS ABSOLUTE: 0 K/UL (ref 0–0.2)
BASOPHILS RELATIVE PERCENT: 0.4 %
BILIRUB SERPL-MCNC: 0.3 MG/DL (ref 0–1)
BUN BLDV-MCNC: 8 MG/DL (ref 7–20)
CALCIUM SERPL-MCNC: 9.8 MG/DL (ref 8.3–10.6)
CHLORIDE BLD-SCNC: 97 MMOL/L (ref 99–110)
CHOLESTEROL, TOTAL: 124 MG/DL (ref 0–199)
CO2: 29 MMOL/L (ref 21–32)
CREAT SERPL-MCNC: 0.8 MG/DL (ref 0.6–1.2)
CREATININE URINE: 123.4 MG/DL (ref 28–259)
EOSINOPHILS ABSOLUTE: 0.2 K/UL (ref 0–0.6)
EOSINOPHILS RELATIVE PERCENT: 2.1 %
GFR AFRICAN AMERICAN: >60
GFR NON-AFRICAN AMERICAN: >60
GLOBULIN: 3 G/DL
GLUCOSE BLD-MCNC: 192 MG/DL (ref 70–99)
HCT VFR BLD CALC: 40.1 % (ref 36–48)
HDLC SERPL-MCNC: 55 MG/DL (ref 40–60)
HEMOGLOBIN: 13.3 G/DL (ref 12–16)
LDL CHOLESTEROL CALCULATED: 58 MG/DL
LYMPHOCYTES ABSOLUTE: 2.4 K/UL (ref 1–5.1)
LYMPHOCYTES RELATIVE PERCENT: 29.8 %
MCH RBC QN AUTO: 30.6 PG (ref 26–34)
MCHC RBC AUTO-ENTMCNC: 33.3 G/DL (ref 31–36)
MCV RBC AUTO: 91.9 FL (ref 80–100)
MICROALBUMIN UR-MCNC: <1.2 MG/DL
MICROALBUMIN/CREAT UR-RTO: NORMAL MG/G (ref 0–30)
MONOCYTES ABSOLUTE: 0.6 K/UL (ref 0–1.3)
MONOCYTES RELATIVE PERCENT: 7.3 %
NEUTROPHILS ABSOLUTE: 4.8 K/UL (ref 1.7–7.7)
NEUTROPHILS RELATIVE PERCENT: 60.4 %
PDW BLD-RTO: 14.6 % (ref 12.4–15.4)
PLATELET # BLD: 238 K/UL (ref 135–450)
PMV BLD AUTO: 10.9 FL (ref 5–10.5)
POTASSIUM SERPL-SCNC: 4.2 MMOL/L (ref 3.5–5.1)
RBC # BLD: 4.37 M/UL (ref 4–5.2)
SODIUM BLD-SCNC: 139 MMOL/L (ref 136–145)
TOTAL PROTEIN: 6.8 G/DL (ref 6.4–8.2)
TRIGL SERPL-MCNC: 54 MG/DL (ref 0–150)
TSH SERPL DL<=0.05 MIU/L-ACNC: 1.25 UIU/ML (ref 0.27–4.2)
VITAMIN D 25-HYDROXY: 25 NG/ML
VLDLC SERPL CALC-MCNC: 11 MG/DL
WBC # BLD: 8 K/UL (ref 4–11)

## 2020-03-30 ENCOUNTER — VIRTUAL VISIT (OUTPATIENT)
Dept: ENDOCRINOLOGY | Age: 65
End: 2020-03-30
Payer: COMMERCIAL

## 2020-03-30 ENCOUNTER — VIRTUAL VISIT (OUTPATIENT)
Dept: ENDOCRINOLOGY | Age: 65
End: 2020-03-30

## 2020-03-30 PROCEDURE — 99443 PR PHYS/QHP TELEPHONE EVALUATION 21-30 MIN: CPT | Performed by: INTERNAL MEDICINE

## 2020-03-30 RX ORDER — CALCIUM CITRATE/VITAMIN D3 200MG-6.25
1 TABLET ORAL 3 TIMES DAILY
Qty: 300 EACH | Refills: 3 | Status: SHIPPED | OUTPATIENT
Start: 2020-03-30 | End: 2021-11-29 | Stop reason: SDUPTHER

## 2020-03-30 RX ORDER — INSULIN HUMAN 500 [IU]/ML
INJECTION, SOLUTION SUBCUTANEOUS
Qty: 18 PEN | Refills: 1 | Status: SHIPPED | OUTPATIENT
Start: 2020-03-30 | End: 2020-03-30 | Stop reason: SDUPTHER

## 2020-03-30 RX ORDER — INSULIN HUMAN 500 [IU]/ML
INJECTION, SOLUTION SUBCUTANEOUS
Qty: 18 PEN | Refills: 1 | Status: SHIPPED | OUTPATIENT
Start: 2020-03-30 | End: 2020-08-05

## 2020-03-30 NOTE — PROGRESS NOTES
nodules, not on medication    Past Medical History:   Diagnosis Date    Anxiety and depression     Asthma     Edema     GERD (gastroesophageal reflux disease)     Hyperlipidemia     Hypertension     IBS (irritable bowel syndrome)     Irregular menstrual bleeding 2011    Dr. Bonilla Quintanilla obesity ([facility] [address]     Multinodular goiter 2010    Personal history of colonic polyps     Dr. Hooker [place]- due for repeat colonoscopy     Positive PPD, treated     INH completed 2001    S/P cholecystectomy 2011    Type II or unspecified type diabetes mellitus without mention of complication, not stated as uncontrolled      Past Surgical History:   Procedure Laterality Date     SECTION N/A 30 plus years ago    x2    CHOLECYSTECTOMY      3/2009    COLONOSCOPY      2012 Dr. Truman Russell 5 yrs    COLONOSCOPY      repeat x 3 yrs HAD 5 POLPYS    HYSTEROSCOPY  2015    HYSTEROSCOPY, DILATATION AND CURETTAGE WITH NOVASURE ABLATION AND ABLATION    MYOMECTOMY      2003    TIBIA FRACTURE SURGERY      2006 w/ revision     Current Outpatient Medications   Medication Sig Dispense Refill    cloNIDine (CATAPRES) 0.1 MG tablet Take 1 tablet by mouth every 8 hours 270 tablet 3    diclofenac sodium 1 % GEL Apply 4 g topically 4 times daily 3 Tube 5    buPROPion (WELLBUTRIN XL) 300 MG extended release tablet Take 1 tablet by mouth every morning 90 tablet 3    chlorthalidone (HYGROTON) 25 MG tablet Take 1 tablet by mouth daily 30 tablet 3    busPIRone (BUSPAR) 15 MG tablet Take 15 mg by mouth 3 times daily as needed (anxiety) 90 tablet 3    atorvastatin (LIPITOR) 40 MG tablet TAKE 1 TABLET DAILY 90 tablet 3    FLUoxetine (PROZAC) 40 MG capsule TAKE 1 CAPSULE DAILY 90 capsule 3    Dulaglutide (TRULICITY) 9.93 AN/8.1LK SOPN 0.75 mg weekly 12 pen 3    insulin regular human (HUMULIN R U-500 KWIKPEN) 500 UNIT/ML SOPN concentrated injection pen  units before breakfast and dinner 18 pen 1

## 2020-04-07 ENCOUNTER — TELEPHONE (OUTPATIENT)
Dept: ENDOCRINOLOGY | Age: 65
End: 2020-04-07

## 2020-04-07 NOTE — TELEPHONE ENCOUNTER
Patient is calling to leave a message for Ree Soares. Patient states that [facility] [employer] has reached out to the office 3 times with no response.     They need a response so that they can fill the patient's prescription   [phone]

## 2020-04-22 ENCOUNTER — TELEPHONE (OUTPATIENT)
Dept: ENDOCRINOLOGY | Age: 65
End: 2020-04-22

## 2020-04-28 ENCOUNTER — VIRTUAL VISIT (OUTPATIENT)
Dept: ENDOCRINOLOGY | Age: 65
End: 2020-04-28
Payer: COMMERCIAL

## 2020-04-28 PROCEDURE — G2063 QUAL NONMD EST PT 21>MIN: HCPCS | Performed by: DIETITIAN, REGISTERED

## 2020-04-28 NOTE — PROGRESS NOTES
Medical Nutrition Therapy for Diabetes    Tristan Lorenzo  April 28, 2020      Patient Care Team:  Bj Hammond MD as PCP - Adry Rubi MD as PCP - [facility]  Cindy Saha MD as Consulting Physician (Endocrinology)  Syeda Rodriguez MD as Obstetrician (Obstetrics & Gynecology)    Reason for visit: uncontrolled Type 2     ASSESSMENT/PLAN:   NUTRITION DIAGNOSIS       Identified patient by name and date of birth. Patient is meeting this telephone appointment at home. I am at [facility] Endocrinology office. Patient is the sole participant on the phone call. Pursuant to the emergency declaration under the [facility], [facility] waiver authority and the [employer] General Act this Telephone Visit was insisted, with patient's consent, to reduce the patient's risk of exposure to    COVID-19 and provide continuity of care for an established patient. #1 Problem: Altered Nutrition-Related Laboratory Values (NC-2.2)  Related to: Endocrine/Diabetes   As Evidenced by: Elevated Plasma glucose and/or HgbA1c levels         #2 Problem: Overweight/Obesity (NC-3.3)  Related to: Excessive energy intake or physical inactivity  As Evidenced by: BMI more than normative standard for age and sex (BMI=52.31kg/m2)    #3 Problem: Excessive Carbohydrate Intake (NI-5.8. 2)  Related to: Food-and nutrition-related knowledge deficit concerning appropriate amount of carbohydrate intake  As evidenced by: Estimated carbohydrate intake that is consistently more than the recommended amounts    NUTRITION INTERVENTION  Nutrition Prescription: 3 meals     Diabetes Education/Counseling included: brief pathophysiology about Liver releasing large amounts of glucose  Carbohydrate Control, Monitoring, Hypoyglycemia prevention/treatment, Insulin management and other: benefits of quality sleep.     Interventions:  Encouraged to increase

## 2020-07-14 ENCOUNTER — VIRTUAL VISIT (OUTPATIENT)
Dept: ENDOCRINOLOGY | Age: 65
End: 2020-07-14
Payer: COMMERCIAL

## 2020-07-14 PROCEDURE — 97802 MEDICAL NUTRITION INDIV IN: CPT | Performed by: DIETITIAN, REGISTERED

## 2020-07-14 NOTE — PROGRESS NOTES
Medical Nutrition Therapy for Diabetes    Dillon Godfrey  July 14, 2020      Patient Care Team:  Jase De Anda MD as PCP - Frankie Hall MD as PCP - [facility]  Eusebio Henderson MD as Consulting Physician (Endocrinology)  aPrish Redman MD as Obstetrician (Obstetrics & Gynecology)    Reason for visit: uncontrolled, Type 2 Diabetes    ASSESSMENT/PLAN:   NUTRITION DIAGNOSIS   Identified patient by name and date of birth. Patient is meeting this telephone appointment at home. I am at [address] [place] [address] Endocrinology office. Patient is the sole participant on the phone call. Pursuant to the emergency declaration under the [address] [place] [address], [facility] waiver authority and the [employer] Act this Telephone Visit was insisted, with patient's consent, to reduce the patient's risk of exposure to    COVID-19 and provide continuity of care for an established patient. #1 Problem: Altered Nutrition-Related Laboratory Values (NC-2.2)  Related to: Endocrine/Diabetes   As Evidenced by: Elevated Plasma glucose and/or HgbA1c levels         #2 Problem: Overweight/Obesity (NC-3.3)  Related to: Excessive energy intake or physical inactivity  As Evidenced by: BMI more than normative standard for age and sex (BMI=52.31kg/m2)     #3 Problem: Excessive Carbohydrate Intake (NI-5.8. 2)  Related to: Food-and nutrition-related knowledge deficit concerning appropriate amount of carbohydrate intake  As evidenced by: Estimated carbohydrate intake that is consistently more than the recommended amounts          NUTRITION INTERVENTION  Nutrition Prescription: 3 meals daily    Diabetes Education/Counseling included: Alternatives to high carbohydrate beverages, benefits to 3 meals,  Daily, reviewed checking blood sugar prior to meals and before insulin injection  Reviewed rational for treating Sleep Apnea.   Interventions:  Recommended no or low sugar beverages (crystal lite) and water. Suggested patient add 1 additional meal daily. Encouraged checking glucose before meals and insulin administration. NUTRITION MONITORING AND EVALUATION  Increase water  Begin 2 meals daily  Check blood sugar before taking insulin         Patient Active Problem List   Diagnosis    Diabetes mellitus ([address]    Edema    IBS (irritable bowel syndrome)    Arthritis    Hypertension    Allergic rhinitis    Obstructive sleep apnea syndrome    Obese    Fibroid    Colon polyps    Depression    Anxiety    GERD (gastroesophageal reflux disease)    Vitamin D deficiency    Insomnia    Urinary incontinence    Chronic bilateral low back pain without sciatica    Venous stasis dermatitis of both lower extremities    Multiple thyroid nodules    Pure hypercholesterolemia    Chronic pain of both knees    Bilateral ankle pain    Urinary frequency    Bilateral hand numbness    Plantar fasciitis    Right knee injury    Primary osteoarthritis of right knee    Acute medial meniscus tear of right knee       Current Outpatient Medications   Medication Sig Dispense Refill    insulin regular human (HUMULIN R U-500 KWIKPEN) 500 UNIT/ML SOPN concentrated injection pen  units before meals 18 pen 1    Insulin Pen Needle 32G X 4 MM MISC 1 each by Does not apply route 3 times daily 300 each 3    blood glucose test strips (TRUE METRIX BLOOD GLUCOSE TEST) strip 1 each by In Vitro route 3 times daily As needed.  300 each 3    cloNIDine (CATAPRES) 0.1 MG tablet Take 1 tablet by mouth every 8 hours 270 tablet 3    diclofenac sodium 1 % GEL Apply 4 g topically 4 times daily 3 Tube 5    buPROPion (WELLBUTRIN XL) 300 MG extended release tablet Take 1 tablet by mouth every morning 90 tablet 3    chlorthalidone (HYGROTON) 25 MG tablet Take 1 tablet by mouth daily 30 tablet 3    busPIRone (BUSPAR) 15 MG tablet Take 15 mg by mouth 3 times daily as needed (anxiety) 90 tablet 3    atorvastatin (LIPITOR) 40 MG tablet TAKE 1 TABLET DAILY 90 tablet 3    FLUoxetine (PROZAC) 40 MG capsule TAKE 1 CAPSULE DAILY 90 capsule 3    Dulaglutide (TRULICITY) 4.99 TM/6.2NK SOPN 0.75 mg weekly 12 pen 3    betamethasone dipropionate (DIPROLENE) 0.05 % ointment APPLY TOPICALLY DAILY 90 g 3    verapamil (CALAN SR) 240 MG extended release tablet TAKE 1 TABLET NIGHTLY 90 tablet 3    metoprolol succinate (TOPROL XL) 50 MG extended release tablet Take 1 tablet by mouth nightly 30 tablet 3    losartan (COZAAR) 100 MG tablet TAKE 1 TABLET DAILY 90 tablet 4    montelukast (SINGULAIR) 10 MG tablet Take 1 tablet by mouth nightly 90 tablet 3    loratadine (CLARITIN) 10 MG tablet Take 10 mg by mouth daily      ONE TOUCH LANCETS MISC 1 each by Does not apply route daily 100 each 3     No current facility-administered medications for this visit.           NUTRITION ASSESSMENT    Biochemical Data:    Lab Results   Component Value Date    LABA1C 10.5 12/31/2019     Lab Results   Component Value Date    .0 08/22/2015       Lab Results   Component Value Date    CHOL 124 03/14/2020    CHOL 161 01/15/2019    CHOL 129 09/21/2017     Lab Results   Component Value Date    TRIG 54 03/14/2020    TRIG 75 01/15/2019    TRIG 60 09/21/2017     Lab Results   Component Value Date    HDL 55 03/14/2020    HDL 63 (H) 01/15/2019    HDL 66 (H) 09/21/2017     Lab Results   Component Value Date    LDLCALC 58 03/14/2020    LDLCALC 83 01/15/2019    LDLCALC 51 09/21/2017     Lab Results   Component Value Date    LABVLDL 11 03/14/2020    LABVLDL 15 01/15/2019    LABVLDL 12 09/21/2017     No results found for: CHOLHDLRATIO    Lab Results   Component Value Date    WBC 8.0 03/14/2020    HGB 13.3 03/14/2020    HCT 40.1 03/14/2020    MCV 91.9 03/14/2020     03/14/2020       Lab Results   Component Value Date    CREATININE 0.8 03/14/2020    BUN 8 03/14/2020     03/14/2020    K 4.2 03/14/2020    CL 97 (L) 03/14/2020 CO2 29 03/14/2020       Diabetes Medications: Yes  Knows name and dose of prescribed medications Yes  Knows prescribed schedule for medicationsYes  Recent change in medication type/dosage: No  Stores  medications properlyYes  Comments:     Monitoring:   Has BG meter: Yes  Testing frequency:   Recent results: fasting 81 (today), before lunch 198, evening 303  Hypoglycemia? No, hypo symptoms    Anthropometric Measurements: Wt:   Wt Readings from Last 3 Encounters:   02/01/20 (!) 334 lb (151.5 kg)   12/31/19 (!) 345 lb (156.5 kg)   12/13/19 (!) 345 lb (156.5 kg)      BMI:   BMI Readings from Last 3 Encounters:   02/01/20 52.31 kg/m²   12/31/19 54.03 kg/m²   12/13/19 54.03 kg/m²     Patient's stated goal weight:   7% Weight loss goal weight:     Physical Activity History:   Physical activity: none  Frequency of activity:   Duration of activity:   Obstacles to activity:     Sleep: \"not good\", napping during the day    Food and Nutrition History:   Nutrition Awareness/Previous DSMES: yes  Beverage consumption: 32 ounces water, diluted regular lemonade  Alcohol consumption: rarely  Frequency of Meals Eaten away from home:daily  Food Availability Problems  Within the past 12 months, have you worried that your food would run out before you got money to buy more? No  Within the past 12 months, has the food you bought not lasted till the end of the month and you didn't have money to get more?  No  Number of people in household: 2  Usual Food consumption:   1 large meal, diluted regular lemonade     Barriers:   -none          Follow Up Plan: 1 month    Referring Provider: Marge Carias MD  Time spent with patient: 15 minute

## 2020-07-28 RX ORDER — VERAPAMIL HYDROCHLORIDE 240 MG/1
TABLET, FILM COATED, EXTENDED RELEASE ORAL
Qty: 90 TABLET | Refills: 3 | Status: SHIPPED | OUTPATIENT
Start: 2020-07-28 | End: 2022-01-27

## 2020-07-28 RX ORDER — CHLORTHALIDONE 25 MG/1
TABLET ORAL
Qty: 30 TABLET | Refills: 3 | Status: SHIPPED | OUTPATIENT
Start: 2020-07-28 | End: 2021-08-10

## 2020-08-05 ENCOUNTER — VIRTUAL VISIT (OUTPATIENT)
Dept: ENDOCRINOLOGY | Age: 65
End: 2020-08-05
Payer: COMMERCIAL

## 2020-08-05 PROCEDURE — 99214 OFFICE O/P EST MOD 30 MIN: CPT | Performed by: INTERNAL MEDICINE

## 2020-08-05 RX ORDER — INSULIN GLARGINE 100 [IU]/ML
INJECTION, SOLUTION SUBCUTANEOUS
Qty: 10 PEN | Refills: 3 | Status: SHIPPED | OUTPATIENT
Start: 2020-08-05 | End: 2021-05-10 | Stop reason: ALTCHOICE

## 2020-08-05 RX ORDER — INSULIN LISPRO 100 [IU]/ML
INJECTION, SOLUTION INTRAVENOUS; SUBCUTANEOUS
Qty: 15 PEN | Refills: 2 | Status: SHIPPED | OUTPATIENT
Start: 2020-08-05 | End: 2021-05-10

## 2020-08-05 NOTE — PROGRESS NOTES
Seen as f/u patient for diabetes      Pursuant to the emergency declaration under the [facility] waiver authority and the [employer] General Act this Telephone Visit was insisted, with patient's consent, to reduce the patient's risk of exposure to COVID-19 and provide continuity of care for an established patient. Services were provided through a synchronous discussion over a telephone to substitute for in-person clinic visit.   Video connect  Audio issues as pt arelis not working , completed through phone visit    Interm:  Would like to take insulin after meal  Meal are irregular  Reports due to work schedule  Willing to go back to basal bolus    Diagnosed with Type 2 diabetes mellitus 8 years ago  Known diabetic complications: Retinopathy NPDR    Current diabetic medications     U-500 80/70/50  But taking only with lunch n dinner  Takes it after dinner  Lows overnight   SSI 5 for 29>079  Trulicity    Moderate, uncontrolled    Previous:    Lantus 70/70    But missing second shot 3 days a week   But doing 66/66  Humalog 38 units AC TID, taking it 2/week    But doing 34   SSI 2 for 50>150  She has relative hypoglycemia in the 70s    H/o use of lantus, novolog, victoza, januvia    Metformin and ER caused GI symptoms    Last A1c 9.2%<----10.5%<-----9.3%<------9.9%<-----9.2%<------10.2 on 5/17<--- 8.4 on 12/16<-----7.8 on 6/16<------ 9.6 on 2/16<---- 10.5 on 10/15<-----11.8 on 8/15<---14.2 <--- 10.6 <--- 9.4    Prior visit with dietician: Yes   Current diet: on average, 3 meals per day does not follow low CHO  Current exercise: walking   Current monitoring regimen: home blood tests -occ    Has brought blood glucose log/meter:   Home blood sugar records: - 200s  Any episodes of hypoglycemia? no    No Hx of CAD , PVD, CVA    Hyperlipidemia:controlled on statin LDL 51 on 9/17  Simvastatin 40mg  LDL 58 on 3/20  Last eye exam: 2/20  Last foot exam:   Last microalbumin to creatinine ratio:    HTN: On multiple medications, taking toprol 50mg  Losartan 100mg verapamil 240mg Chlorthalidone 25mg clonidine 0.1mg TID  Uncontrolled    She has thyroid nodules, not on medication    Past Medical History:   Diagnosis Date    Anxiety and depression     Asthma     Edema     GERD (gastroesophageal reflux disease)     Hyperlipidemia     Hypertension     IBS (irritable bowel syndrome)     Irregular menstrual bleeding 2011    Dr. Richards Guard obesity ([address]     Multinodular goiter 2010    Personal history of colonic polyps     Dr. Nancie Dee- due for repeat colonoscopy     Positive PPD, treated     INH completed 2001    S/P cholecystectomy 2011    Type II or unspecified type diabetes mellitus without mention of complication, not stated as uncontrolled      Past Surgical History:   Procedure Laterality Date     SECTION N/A 30 plus years ago    x2    CHOLECYSTECTOMY      3/2009    COLONOSCOPY      2012 Dr. Romano Phi 5 yrs    COLONOSCOPY      repeat x 3 yrs HAD 5 POLPYS    HYSTEROSCOPY  2015    HYSTEROSCOPY, DILATATION AND CURETTAGE WITH NOVASURE ABLATION AND ABLATION    MYOMECTOMY      2003    TIBIA FRACTURE SURGERY      2006 w/ revision     Current Outpatient Medications   Medication Sig Dispense Refill    cloNIDine (CATAPRES) 0.1 MG tablet Take 1 tablet by mouth every 8 hours 270 tablet 3    diclofenac sodium 1 % GEL Apply 4 g topically 4 times daily 3 Tube 5    buPROPion (WELLBUTRIN XL) 300 MG extended release tablet Take 1 tablet by mouth every morning 90 tablet 3    chlorthalidone (HYGROTON) 25 MG tablet Take 1 tablet by mouth daily 30 tablet 3    busPIRone (BUSPAR) 15 MG tablet Take 15 mg by mouth 3 times daily as needed (anxiety) 90 tablet 3    atorvastatin (LIPITOR) 40 MG tablet TAKE 1 TABLET DAILY 90 tablet 3    FLUoxetine (PROZAC) 40 MG capsule TAKE 1 CAPSULE DAILY 90 capsule 3  Dulaglutide (TRULICITY) 6.45 EN/0.8EN SOPN 0.75 mg weekly 12 pen 3    insulin regular human (HUMULIN R U-500 KWIKPEN) 500 UNIT/ML SOPN concentrated injection pen  units before breakfast and dinner 18 pen 1    betamethasone dipropionate (DIPROLENE) 0.05 % ointment APPLY TOPICALLY DAILY 90 g 3    verapamil (CALAN SR) 240 MG extended release tablet TAKE 1 TABLET NIGHTLY 90 tablet 3    metoprolol succinate (TOPROL XL) 50 MG extended release tablet Take 1 tablet by mouth nightly 30 tablet 3    losartan (COZAAR) 100 MG tablet TAKE 1 TABLET DAILY 90 tablet 4    montelukast (SINGULAIR) 10 MG tablet Take 1 tablet by mouth nightly 90 tablet 3    Insulin Pen Needle 32G X 4 MM MISC 1 each by Does not apply route daily 100 each 3    loratadine (CLARITIN) 10 MG tablet Take 10 mg by mouth daily      ONE TOUCH LANCETS MISC 1 each by Does not apply route daily 100 each 3     No current facility-administered medications for this visit. Review of Systems    Constitutional: Negative for weight loss and malaise/fatigue. Negative for fever and chills. Eyes: Negative for blurred vision. Negative for double vision, photophobia and pain. Respiratory: Negative for cough and sputum production. Cardiovascular: Negative for chest pain, palpitations and leg swelling. Gastrointestinal: Negative for nausea, vomiting and abdominal pain. Genitourinary: Negative for dysuria, urgency and frequency. Skin: Negative for itching and rash.    Endo/Heme/Allergies: see HPI  Neuro: No headache, dizziness   HEENT: no hearing loss, ear pain    1/19  Skeletal foot exam is normal, no skin lesions, toenails are normal,10 g monofilament is 10/10 on the right and 10/10 on the left    Lab Reviewed   No components found for: CHLPL  Lab Results   Component Value Date    TRIG 54 03/14/2020    TRIG 75 01/15/2019    TRIG 60 09/21/2017     Lab Results   Component Value Date    HDL 55 03/14/2020    HDL 63 (H) 01/15/2019    HDL 66 (H) 09/21/2017     Lab Results   Component Value Date    LDLCALC 58 03/14/2020    LDLCALC 83 01/15/2019    LDLCALC 51 09/21/2017     Lab Results   Component Value Date    LABVLDL 11 03/14/2020    LABVLDL 15 01/15/2019    LABVLDL 12 09/21/2017     Lab Results   Component Value Date    LABA1C 10.5 12/31/2019       Assessment:     Pedro Luis Avelar is a 62 y.o. female with :    1.T2DM: Longstanding, uncontrolled. Discussed goals, provided education. Given her A1c and hyperglycemia,switched to U-500 , discussed risk of lows, needs self monitoring. Advised to take insulin 30 min before the meal.  Report low occasional, reports difficulty taking before meals, would like to take any insulin after meals. Would switch back to lantus/novolog as irregular meal pattern  CGM not covered  2. HTN. BP stable per patient. Aldosterone and renin nl, lab did not check MN  3. HLD: LDL at goal, on statin  4. Obesity: Discussed weight loss, refer to bariatrics  5. Depression  6. MNG: Per history-h/o FNA, reports FNA was benign years ago, will need USG, did not have done  7. ADELINA  8. Vitamin D deficiency      Plan:      Stop U-500   Start lantus 70 units HS   Humalog 34 units AC TID   SSI 2 for 50 > 150   Advise to check blood sugar 4 times a day   Patient to send blood sugar log for titration. Advise to low simple carbohydrate and protein with each  meal diet. Diabetes Care: recommend yearly eye exam, foot exam and urine microalbumin to   creatinine ratio.       -Hyperlipidemia, LDL goal is <100 mg/dl   -Hypertension; Continue same  -Daily ASA: Added 81mg   -Smoking status: Non smoker      Needs FMLA for DM visits

## 2020-08-11 ENCOUNTER — VIRTUAL VISIT (OUTPATIENT)
Dept: ENDOCRINOLOGY | Age: 65
End: 2020-08-11
Payer: COMMERCIAL

## 2020-08-11 PROCEDURE — G2063 QUAL NONMD EST PT 21>MIN: HCPCS | Performed by: DIETITIAN, REGISTERED

## 2020-08-11 NOTE — PROGRESS NOTES
patient. Interventions:  Encouraged 3 meals and inclusion of carbohydrate each meal.  Recommended testing glucose prior to each meal and Lantus injection. Sent insulin form with current MD prescription. Provided glucose log form for record keeping. NUTRITION MONITORING AND EVALUATION  3 meals  Take insulin as prescribed. Record glucose results         Patient Active Problem List   Diagnosis    Diabetes mellitus ([address]    Edema    IBS (irritable bowel syndrome)    Arthritis    Hypertension    Allergic rhinitis    Obstructive sleep apnea syndrome    Obese    Fibroid    Colon polyps    Depression    Anxiety    GERD (gastroesophageal reflux disease)    Vitamin D deficiency    Insomnia    Urinary incontinence    Chronic bilateral low back pain without sciatica    Venous stasis dermatitis of both lower extremities    Multiple thyroid nodules    Pure hypercholesterolemia    Chronic pain of both knees    Bilateral ankle pain    Urinary frequency    Bilateral hand numbness    Plantar fasciitis    Right knee injury    Primary osteoarthritis of right knee    Acute medial meniscus tear of right knee       Current Outpatient Medications   Medication Sig Dispense Refill    insulin glargine (LANTUS SOLOSTAR) 100 UNIT/ML injection pen 70 units daily 10 pen 3    insulin lispro, 1 Unit Dial, (HUMALOG KWIKPEN) 100 UNIT/ML SOPN 34-40 units AC TID 15 pen 2    verapamil (CALAN SR) 240 MG extended release tablet TAKE 1 TABLET NIGHTLY 90 tablet 3    chlorthalidone (HYGROTON) 25 MG tablet TAKE 1 TABLET DAILY 30 tablet 3    Insulin Pen Needle 32G X 4 MM MISC 1 each by Does not apply route 3 times daily 300 each 3    blood glucose test strips (TRUE METRIX BLOOD GLUCOSE TEST) strip 1 each by In Vitro route 3 times daily As needed.  300 each 3    cloNIDine (CATAPRES) 0.1 MG tablet Take 1 tablet by mouth every 8 hours 270 tablet 3    diclofenac sodium 1 % GEL Apply 4 g topically 4 times daily 3 Tube 5    buPROPion (WELLBUTRIN XL) 300 MG extended release tablet Take 1 tablet by mouth every morning 90 tablet 3    busPIRone (BUSPAR) 15 MG tablet Take 15 mg by mouth 3 times daily as needed (anxiety) 90 tablet 3    atorvastatin (LIPITOR) 40 MG tablet TAKE 1 TABLET DAILY 90 tablet 3    FLUoxetine (PROZAC) 40 MG capsule TAKE 1 CAPSULE DAILY 90 capsule 3    Dulaglutide (TRULICITY) 4.57 AG/0.4LD SOPN 0.75 mg weekly 12 pen 3    betamethasone dipropionate (DIPROLENE) 0.05 % ointment APPLY TOPICALLY DAILY 90 g 3    metoprolol succinate (TOPROL XL) 50 MG extended release tablet Take 1 tablet by mouth nightly 30 tablet 3    losartan (COZAAR) 100 MG tablet TAKE 1 TABLET DAILY 90 tablet 4    montelukast (SINGULAIR) 10 MG tablet Take 1 tablet by mouth nightly 90 tablet 3    loratadine (CLARITIN) 10 MG tablet Take 10 mg by mouth daily      ONE TOUCH LANCETS MISC 1 each by Does not apply route daily 100 each 3     No current facility-administered medications for this visit.           NUTRITION ASSESSMENT    Biochemical Data:    Lab Results   Component Value Date    LABA1C 9.2 08/01/2020     Lab Results   Component Value Date    .3 08/01/2020       Lab Results   Component Value Date    CHOL 124 03/14/2020    CHOL 161 01/15/2019    CHOL 129 09/21/2017     Lab Results   Component Value Date    TRIG 54 03/14/2020    TRIG 75 01/15/2019    TRIG 60 09/21/2017     Lab Results   Component Value Date    HDL 55 03/14/2020    HDL 63 (H) 01/15/2019    HDL 66 (H) 09/21/2017     Lab Results   Component Value Date    LDLCALC 58 03/14/2020    LDLCALC 83 01/15/2019    LDLCALC 51 09/21/2017     Lab Results   Component Value Date    LABVLDL 11 03/14/2020    LABVLDL 15 01/15/2019    LABVLDL 12 09/21/2017     No results found for: CHOLHDLRATIO    Lab Results   Component Value Date    WBC 8.0 03/14/2020    HGB 13.3 03/14/2020    HCT 40.1 03/14/2020    MCV 91.9 03/14/2020     03/14/2020       Lab Results   Component Value Date CREATININE 0.8 03/14/2020    BUN 8 03/14/2020     03/14/2020    K 4.2 03/14/2020    CL 97 (L) 03/14/2020    CO2 29 03/14/2020       Diabetes Medications: Yes  Knows name and dose of prescribed medications Yes  Knows prescribed schedule for medicationsYes  Recent change in medication type/dosage: No  Stores  medications properlyYes  Comments:     Monitoring:   Has BG meter: Yes  Testing frequency: 3   Recent results: fasting 8, ac lunch 198, evening 303  Hypoglycemia? No    Anthropometric Measurements: Wt:   Wt Readings from Last 3 Encounters:   02/01/20 (!) 334 lb (151.5 kg)   12/31/19 (!) 345 lb (156.5 kg)   12/13/19 (!) 345 lb (156.5 kg)      BMI:   BMI Readings from Last 3 Encounters:   02/01/20 52.31 kg/m²   12/31/19 54.03 kg/m²   12/13/19 54.03 kg/m²     Patient's stated goal weight:   7% Weight loss goal weight: 310 kg    Physical Activity History:   Physical activity: none  Frequency of activity:   Duration of activity:   Obstacles to activity:     Sleep: \"not good\" frequent urination    Food and Nutrition History:   Nutrition Awareness/Previous DSMES: yes  Beverage consumption: less sugar lemonade - 1-2 lunch/dinner, water - 32 ounces  Alcohol consumption: rarely, wine 1 glass  Frequency of Meals Eaten away from home:most days  Food Availability Problems  Within the past 12 months, have you worried that your food would run out before you got money to buy more? No  Within the past 12 months, has the food you bought not lasted till the end of the month and you didn't have money to get more? No  Number of people in household: 2  Usual Food consumption:   1-2 meals (late in the day)     Barriers:   -none          Follow Up Plan: 1 month  Contact information provided.     Referring Provider: Pinky Chappell MD  Time spent with patient: 30 minutes

## 2020-08-28 ENCOUNTER — VIRTUAL VISIT (OUTPATIENT)
Dept: INTERNAL MEDICINE CLINIC | Age: 65
End: 2020-08-28
Payer: COMMERCIAL

## 2020-08-28 PROBLEM — S83.241A ACUTE MEDIAL MENISCUS TEAR OF RIGHT KNEE: Chronic | Status: RESOLVED | Noted: 2019-04-30 | Resolved: 2020-08-28

## 2020-08-28 PROBLEM — M17.11 PRIMARY OSTEOARTHRITIS OF RIGHT KNEE: Chronic | Status: RESOLVED | Noted: 2019-04-30 | Resolved: 2020-08-28

## 2020-08-28 PROBLEM — R10.11 RUQ PAIN: Status: ACTIVE | Noted: 2020-08-28

## 2020-08-28 PROCEDURE — 99214 OFFICE O/P EST MOD 30 MIN: CPT | Performed by: INTERNAL MEDICINE

## 2020-08-28 RX ORDER — OMEPRAZOLE 40 MG/1
40 CAPSULE, DELAYED RELEASE ORAL
Qty: 30 CAPSULE | Refills: 1 | Status: SHIPPED | OUTPATIENT
Start: 2020-08-28 | End: 2022-03-25

## 2020-08-28 ASSESSMENT — ENCOUNTER SYMPTOMS
COLOR CHANGE: 0
WHEEZING: 0
CHEST TIGHTNESS: 0
NAUSEA: 1
BACK PAIN: 0
ABDOMINAL PAIN: 1
DIARRHEA: 1

## 2020-08-28 NOTE — PROGRESS NOTES
2020    TELEHEALTH EVALUATION -- Audio/Visual (During MDCJE-66 public health emergency)    HPI:    Grover Beckwith (:  1955) has requested an audio/video evaluation for the following concern(s):    Having pain in right upper abdomen-feels like same pain she had after GB surgery- worse w deep breath- not necessarily worse w eating but not sure- coughing more w allergies and feels worse w deep cough- some increased nausea and reflux and belching - no fever- no constipation but some sl diarrhea- A1C was 9.2 last check-no progress w wt loss-bp's have been ok-still w occasional LBP but this pain seems unrelated     Review of Systems   Constitutional: Negative for activity change, appetite change and fatigue. HENT: Negative for congestion. Eyes: Negative for visual disturbance. Respiratory: Negative for chest tightness and wheezing. Cardiovascular: Negative for chest pain and palpitations. Gastrointestinal: Positive for abdominal pain, diarrhea and nausea. Musculoskeletal: Negative for arthralgias and back pain. Skin: Negative for color change and rash. Neurological: Negative for weakness, light-headedness and headaches. Psychiatric/Behavioral: Negative for dysphoric mood. The patient is not nervous/anxious. Prior to Visit Medications    Medication Sig Taking?  Authorizing Provider   omeprazole (PRILOSEC) 40 MG delayed release capsule Take 1 capsule by mouth every morning (before breakfast) Yes Damon Duran MD   insulin glargine (LANTUS SOLOSTAR) 100 UNIT/ML injection pen 70 units daily Yes Susi Jones MD   insulin lispro, 1 Unit Dial, (HUMALOG KWIKPEN) 100 UNIT/ML SOPN 34-40 units AC TID Yes Susi Jones MD   verapamil (CALAN SR) 240 MG extended release tablet TAKE 1 TABLET NIGHTLY Yes Damon Duran MD   chlorthalidone (HYGROTON) 25 MG tablet TAKE 1 TABLET DAILY Yes Damon Duran MD   Insulin Pen Needle 32G X 4 MM MISC 1 each by Does not apply route 3 times daily Yes menstrual bleeding 4/16/2011    Dr. Marissa Price obesity [facility])     Multinodular goiter 5/22/2010    Personal history of colonic polyps     Dr. Jacobo Dose- due for repeat colonoscopy 2012    Positive PPD, treated     INH completed 11/2001    S/P cholecystectomy 9/17/2011    Type II or unspecified type diabetes mellitus without mention of complication, not stated as uncontrolled        PHYSICAL EXAMINATION:  There were no vitals filed for this visit. Constitutional: [x] Appears well-developed and well-nourished [x] No apparent distress      [] Abnormal-   Mental status  [x] Alert and awake  [x] Oriented to person/place/time [x]Able to follow commands      Eyes:  EOM    [x]  Normal  [] Abnormal-  Sclera  [x]  Normal  [] Abnormal -         Discharge [x]  None visible  [] Abnormal -    HENT:   [x] Normocephalic, atraumatic. [] Abnormal   [x] Mouth/Throat: Mucous membranes are moist.     External Ears [x] Normal  [] Abnormal-     Neck: [x] No visualized mass     Pulmonary/Chest: [x] Respiratory effort normal.  [x] No visualized signs of difficulty breathing or respiratory distress        [] Abnormal-      Musculoskeletal:   [] Normal gait with no signs of ataxia         [x] Normal range of motion of neck        [] Abnormal-       Neurological:        [x] No Facial Asymmetry (Cranial nerve 7 motor function) (limited exam to video visit)          [x] No gaze palsy        [] Abnormal-         Skin:        [x] No significant exanthematous lesions or discoloration noted on facial skin         [] Abnormal-            Psychiatric:       [x] Normal Affect [x] No Hallucinations        [] Abnormal-         Assessment and Plan:      RUQ pain  Ck u/s for ??  Dilated ducts or liver issues- ck labs if negative and if still having pain will do CT-start PPI     Chronic bilateral low back pain without sciatica  Unlikely related to RUQ pain altho could be MS pain as increases w deep inspiration  Cont to work on stretches

## 2020-09-08 ENCOUNTER — HOSPITAL ENCOUNTER (OUTPATIENT)
Dept: ULTRASOUND IMAGING | Age: 65
Discharge: HOME | End: 2020-09-08
Payer: COMMERCIAL

## 2020-09-08 PROCEDURE — 76705 ECHO EXAM OF ABDOMEN: CPT

## 2020-10-22 ENCOUNTER — NURSE ONLY (OUTPATIENT)
Dept: INTERNAL MEDICINE CLINIC | Age: 65
End: 2020-10-22
Payer: COMMERCIAL

## 2020-10-22 PROCEDURE — 90471 IMMUNIZATION ADMIN: CPT | Performed by: INTERNAL MEDICINE

## 2020-10-22 PROCEDURE — 90688 IIV4 VACCINE SPLT 0.5 ML IM: CPT | Performed by: INTERNAL MEDICINE

## 2020-10-22 NOTE — PROGRESS NOTES
Vaccine Information Sheet, \"Influenza - Inactivated\"  given to Brayan Britt, or parent/legal guardian of  Brayan Britt and verbalized understanding. Patient responses:    Have you ever had a reaction to a flu vaccine? No  Do you have any current illness? No  Have you ever had Guillian Cabot Syndrome? No  Do you have a serious allergy to any of the follow: Neomycin, Polymyxin, Thimerosal, eggs or egg products? No    Flu vaccine given per order. Please see immunization tab. Risks and benefits explained. Current VIS given.       Immunizations Administered     Name Date Dose Route    Influenza, Quadv, IM, (6 mo and older Fluzone, Flulaval, Fluarix and 3 yrs and older Afluria) 10/22/2020 0.5 mL Intramuscular    Site: Deltoid- Left    Lot: [id number]    NDC: 01264-236-34

## 2020-10-30 RX ORDER — LOSARTAN POTASSIUM 100 MG/1
100 TABLET ORAL DAILY
Qty: 90 TABLET | Refills: 3 | Status: SHIPPED | OUTPATIENT
Start: 2020-10-30 | End: 2022-01-27

## 2020-11-24 ENCOUNTER — VIRTUAL VISIT (OUTPATIENT)
Dept: ENDOCRINOLOGY | Age: 65
End: 2020-11-24
Payer: COMMERCIAL

## 2020-11-24 PROCEDURE — 99214 OFFICE O/P EST MOD 30 MIN: CPT | Performed by: INTERNAL MEDICINE

## 2020-11-24 RX ORDER — DULAGLUTIDE 1.5 MG/.5ML
1.5 INJECTION, SOLUTION SUBCUTANEOUS WEEKLY
Qty: 13 PEN | Refills: 2 | Status: SHIPPED | OUTPATIENT
Start: 2020-11-24 | End: 2020-12-22 | Stop reason: SDUPTHER

## 2020-11-24 NOTE — PROGRESS NOTES
Seen as f/u patient for diabetes      Pursuant to the emergency declaration under the [facility] waiver authority and the [employer] and [place] General Act this Telephone Visit was insisted, with patient's consent, to reduce the patient's risk of exposure to COVID-19 and provide continuity of care for an established patient. Services were provided through a synchronous discussion over a telephone to substitute for in-person clinic visit.   Video connect  Audio issues as pt arelis not working , completed through phone visit    Interm:  Now taking insulin before meal  Meal are irregular  Reports due to work schedule  Willing to go back to basal bolus    Diagnosed with Type 2 diabetes mellitus 8 years ago  Known diabetic complications: Retinopathy NPDR    Current diabetic medications     U-500 80/70/80  But taking only with lunch n dinner   SSI 5 for 14>918  Trulicity    Moderate, uncontrolled    Previous:    Lantus 70/70    But missing second shot 3 days a week   But doing 66/66  Humalog 38 units AC TID, taking it 2/week    But doing 34   SSI 2 for 50>150  She has relative hypoglycemia in the 70s    H/o use of lantus, novolog, victoza, januvia    Metformin and ER caused GI symptoms    Last A1c 9.2%<----10.5%<-----9.3%<------9.9%<-----9.2%<------10.2 on 5/17<--- 8.4 on 12/16<-----7.8 on 6/16<------ 9.6 on 2/16<---- 10.5 on 10/15<-----11.8 on 8/15<---14.2 <--- 10.6 <--- 9.4    Prior visit with dietician: Yes   Current diet: on average, 3 meals per day does not follow low CHO  Current exercise: walking   Current monitoring regimen: home blood tests -occ    Has brought blood glucose log/meter:   Home blood sugar records:   Any episodes of hypoglycemia? no    No Hx of CAD , PVD, CVA    Hyperlipidemia:controlled on statin LDL 51 on 9/17  Simvastatin 40mg  LDL 58 on 3/20  Last eye exam: 2/20  Last foot exam: 1/19  Last microalbumin to creatinine ratio:    HTN: On multiple medications, taking toprol 50mg  Losartan 100mg verapamil 240mg Chlorthalidone 25mg clonidine 0.1mg TID  Uncontrolled    She has thyroid nodules, not on medication    Past Medical History:   Diagnosis Date    Anxiety and depression     Asthma     Edema     GERD (gastroesophageal reflux disease)     Hyperlipidemia     Hypertension     IBS (irritable bowel syndrome)     Irregular menstrual bleeding 2011    Dr. Zee Turner obesity ([address]     Multinodular goiter 2010    Personal history of colonic polyps     Dr. Armin Randle- due for repeat colonoscopy     Positive PPD, treated     INH completed 2001    S/P cholecystectomy 2011    Type II or unspecified type diabetes mellitus without mention of complication, not stated as uncontrolled      Past Surgical History:   Procedure Laterality Date     SECTION N/A 30 plus years ago    x2    CHOLECYSTECTOMY      3/2009    COLONOSCOPY      2012 Dr. Houston Comes 5 yrs    COLONOSCOPY      repeat x 3 yrs HAD 5 POLPYS    HYSTEROSCOPY  2015    HYSTEROSCOPY, DILATATION AND CURETTAGE WITH NOVASURE ABLATION AND ABLATION    MYOMECTOMY      2003    TIBIA FRACTURE SURGERY      2006 w/ revision     Current Outpatient Medications   Medication Sig Dispense Refill    cloNIDine (CATAPRES) 0.1 MG tablet Take 1 tablet by mouth every 8 hours 270 tablet 3    diclofenac sodium 1 % GEL Apply 4 g topically 4 times daily 3 Tube 5    buPROPion (WELLBUTRIN XL) 300 MG extended release tablet Take 1 tablet by mouth every morning 90 tablet 3    chlorthalidone (HYGROTON) 25 MG tablet Take 1 tablet by mouth daily 30 tablet 3    busPIRone (BUSPAR) 15 MG tablet Take 15 mg by mouth 3 times daily as needed (anxiety) 90 tablet 3    atorvastatin (LIPITOR) 40 MG tablet TAKE 1 TABLET DAILY 90 tablet 3    FLUoxetine (PROZAC) 40 MG capsule TAKE 1 CAPSULE DAILY 90 capsule 3    Dulaglutide (TRULICITY) 2.13 MG/0.5ML SOPN 0.75 mg weekly 12 pen 3    insulin regular human (HUMULIN R U-500 KWIKPEN) 500 UNIT/ML SOPN concentrated injection pen  units before breakfast and dinner 18 pen 1    betamethasone dipropionate (DIPROLENE) 0.05 % ointment APPLY TOPICALLY DAILY 90 g 3    verapamil (CALAN SR) 240 MG extended release tablet TAKE 1 TABLET NIGHTLY 90 tablet 3    metoprolol succinate (TOPROL XL) 50 MG extended release tablet Take 1 tablet by mouth nightly 30 tablet 3    losartan (COZAAR) 100 MG tablet TAKE 1 TABLET DAILY 90 tablet 4    montelukast (SINGULAIR) 10 MG tablet Take 1 tablet by mouth nightly 90 tablet 3    Insulin Pen Needle 32G X 4 MM MISC 1 each by Does not apply route daily 100 each 3    loratadine (CLARITIN) 10 MG tablet Take 10 mg by mouth daily      ONE TOUCH LANCETS MISC 1 each by Does not apply route daily 100 each 3     No current facility-administered medications for this visit. Review of Systems    Constitutional: Negative for weight loss and malaise/fatigue. Negative for fever and chills. Eyes: Negative for blurred vision. Negative for double vision, photophobia and pain. Respiratory: Negative for cough and sputum production. Cardiovascular: Negative for chest pain, palpitations and leg swelling. Gastrointestinal: Negative for nausea, vomiting and abdominal pain. Genitourinary: Negative for dysuria, urgency and frequency. Skin: Negative for itching and rash.    Endo/Heme/Allergies: see HPI  Neuro: No headache, dizziness   HEENT: no hearing loss, ear pain        PHYSICAL EXAMINATION:  [ INSTRUCTIONS:  \"[x]\" Indicates a positive item  \"[]\" Indicates a negative item  -- DELETE ALL ITEMS NOT EXAMINED]  Vital Signs: (As obtained by patient/caregiver or practitioner observation)    Blood pressure-  Heart rate-    Respiratory rate- 14   Temperature-  Pulse oximetry-     Constitutional Appears well-developed and well-nourished No apparent distress        Mental status  Alert and awake  Oriented to person/place/time  Able to follow commands      Eyes:  EOM    [x]  Normal    Sclera  [x]  Normal           Discharge [x]  None visible      HENT:   [x] Normocephalic, atraumatic. [x] Mouth/Throat: Mucous membranes are moist.     External Ears [x] Normal  no discharge    Neck: [x] No visualized mass  no swelling    Pulmonary/Chest: [x] Respiratory effort normal.  [x] No visualized signs of difficulty breathing or respiratory distress             Musculoskeletal:   [x] Normal gait with no signs of ataxia         [x] Normal range of motion of neck          Head and neck stable, appears normal ROM, strength good    Neurological:        [x] No Facial Asymmetry (Cranial nerve 7 motor function) (limited exam to video visit)          [x] No gaze palsy                Skin:        [x] No significant exanthematous lesions or discoloration noted on facial skin                 Psychiatric:       [x] Normal Affect [x] No Hallucinations            Other pertinent observable physical exam findings-     Due to this being a TeleHealth encounter, evaluation of the following organ systems is limited: Vitals/Constitutional/EENT/Resp/CV/GI//MS/Neuro/Skin/Heme-Lymph-Imm. Services were provided through a video synchronous discussion virtually to substitute for in-person clinic visit.          1/19  Skeletal foot exam is normal, no skin lesions, toenails are normal,10 g monofilament is 10/10 on the right and 10/10 on the left    Lab Reviewed   No components found for: CHLPL  Lab Results   Component Value Date    TRIG 54 03/14/2020    TRIG 75 01/15/2019    TRIG 60 09/21/2017     Lab Results   Component Value Date    HDL 55 03/14/2020    HDL 63 (H) 01/15/2019    HDL 66 (H) 09/21/2017     Lab Results   Component Value Date    LDLCALC 58 03/14/2020    LDLCALC 83 01/15/2019    LDLCALC 51 09/21/2017     Lab Results   Component Value Date    LABVLDL 11 03/14/2020    LABVLDL 15 01/15/2019    LABVLDL 12 09/21/2017 Lab Results   Component Value Date    LABA1C 10.5 12/31/2019       Assessment:     Machelle Patel is a 62 y.o. female with :    1.T2DM: Longstanding, uncontrolled. Discussed goals, provided education. Given her A1c and hyperglycemia,switched to U-500 , discussed risk of lows, needs self monitoring. Advised to take insulin 30 min before the meal.  now taking before meals, will check A1c. Add low dose with breakfast.  Discussed basal bolus but she would like to wait as has insulin U-500  CGM not covered  2. HTN. BP high. Aldosterone and renin nl, lab did not check MN. May need aldactone  3. HLD: LDL at goal, on statin  4. Obesity: Discussed weight loss, refer to bariatrics  5. Depression  6. MNG: Per history-h/o FNA, reports FNA was benign years ago, will need USG, did not have done  7. ADELINA  8. Vitamin D deficiency      Plan:      U-500  20/70/80   Advise to check blood sugar 4 times a day   Patient to send blood sugar log for titration. Advise to low simple carbohydrate and protein with each  meal diet. Diabetes Care: recommend yearly eye exam, foot exam and urine microalbumin to   creatinine ratio.       -Hyperlipidemia, LDL goal is <100 mg/dl   -Hypertension; Continue same  -Daily ASA: Added 81mg   -Smoking status: Non smoker    Patient is on U-500 insulin which has a narrow therapeutic index and high risk of complications including severe hypoglycemia  Needs FMLA for DM visits

## 2020-12-18 ENCOUNTER — PATIENT MESSAGE (OUTPATIENT)
Dept: ENDOCRINOLOGY | Age: 65
End: 2020-12-18

## 2020-12-21 NOTE — TELEPHONE ENCOUNTER
I spoke with insurance on 12/1/2020. Below is the message from my conversation with the insurance company. It is also in the patient message from 12/1/2020. The Juan and spoke with Joseline Mancilla. She stated no Odessia Linger is required. She ran for 28 and 84 day supply and both test claims came back approved. She stated that last date ran on the trulicity was 46/01/1462 so it could be that it was just too early to fill at that time but the pharmacy needs to change the date when they try to run it again.   We also have an approval for the trulicity scanned in media and she confirmed the approval.

## 2020-12-21 NOTE — TELEPHONE ENCOUNTER
Patient called and said the pharmacy cancelled her prescription of trulicity because a PA was not done. She said please call [phone] to submit a PA.

## 2020-12-22 RX ORDER — DULAGLUTIDE 1.5 MG/.5ML
1.5 INJECTION, SOLUTION SUBCUTANEOUS WEEKLY
Qty: 13 PEN | Refills: 2 | Status: SHIPPED | OUTPATIENT
Start: 2020-12-22 | End: 2021-01-11 | Stop reason: SDUPTHER

## 2021-01-11 RX ORDER — DULAGLUTIDE 1.5 MG/.5ML
1.5 INJECTION, SOLUTION SUBCUTANEOUS WEEKLY
Qty: 13 PEN | Refills: 2 | Status: SHIPPED | OUTPATIENT
Start: 2021-01-11 | End: 2021-09-03 | Stop reason: SDUPTHER

## 2021-01-11 RX ORDER — DULAGLUTIDE 1.5 MG/.5ML
1.5 INJECTION, SOLUTION SUBCUTANEOUS WEEKLY
Qty: 13 PEN | Refills: 2 | Status: SHIPPED | OUTPATIENT
Start: 2021-01-11 | End: 2021-01-11 | Stop reason: SDUPTHER

## 2021-01-11 NOTE — TELEPHONE ENCOUNTER
Medication:   Requested Prescriptions     Pending Prescriptions Disp Refills    Dulaglutide (TRULICITY) 1.5 BR/6.3TE SOPN 13 pen 2     Sig: Inject 1.5 mg into the skin once a week         Last appt: 11/24/2020   Next appt: Visit date not found    Last Labs DM:   Lab Results   Component Value Date    LABA1C 9.2 08/01/2020

## 2021-01-21 DIAGNOSIS — E78.00 PURE HYPERCHOLESTEROLEMIA: ICD-10-CM

## 2021-01-21 DIAGNOSIS — R53.83 OTHER FATIGUE: ICD-10-CM

## 2021-01-21 DIAGNOSIS — Z79.4 TYPE 2 DIABETES MELLITUS WITH OTHER CIRCULATORY COMPLICATION, WITH LONG-TERM CURRENT USE OF INSULIN (HCC): ICD-10-CM

## 2021-01-21 DIAGNOSIS — Z00.00 ROUTINE GENERAL MEDICAL EXAMINATION AT A HEALTH CARE FACILITY: ICD-10-CM

## 2021-01-21 DIAGNOSIS — R60.1 GENERALIZED EDEMA: ICD-10-CM

## 2021-01-21 DIAGNOSIS — F41.9 ANXIETY: ICD-10-CM

## 2021-01-21 DIAGNOSIS — E11.59 TYPE 2 DIABETES MELLITUS WITH OTHER CIRCULATORY COMPLICATION, WITH LONG-TERM CURRENT USE OF INSULIN (HCC): ICD-10-CM

## 2021-01-21 DIAGNOSIS — F32.A DEPRESSION, UNSPECIFIED DEPRESSION TYPE: ICD-10-CM

## 2021-01-21 DIAGNOSIS — I10 ESSENTIAL HYPERTENSION: Primary | ICD-10-CM

## 2021-01-22 RX ORDER — ATORVASTATIN CALCIUM 40 MG/1
TABLET, FILM COATED ORAL
Qty: 90 TABLET | Refills: 0 | Status: SHIPPED | OUTPATIENT
Start: 2021-01-22 | End: 2021-12-15

## 2021-01-22 NOTE — TELEPHONE ENCOUNTER
Pt hasn't seen me since summer- needs to do labs and see me for a physical virtually soon in next few weeks -enter lab orders for cmp,cbc,tsh,microalb,A1C to do a few days before visit - I will not refill meds any more after this until seen

## 2021-02-26 DIAGNOSIS — E11.59 TYPE 2 DIABETES MELLITUS WITH OTHER CIRCULATORY COMPLICATION, WITH LONG-TERM CURRENT USE OF INSULIN (HCC): ICD-10-CM

## 2021-02-26 DIAGNOSIS — I10 ESSENTIAL HYPERTENSION: ICD-10-CM

## 2021-02-26 DIAGNOSIS — R53.83 OTHER FATIGUE: ICD-10-CM

## 2021-02-26 DIAGNOSIS — Z79.4 TYPE 2 DIABETES MELLITUS WITH OTHER CIRCULATORY COMPLICATION, WITH LONG-TERM CURRENT USE OF INSULIN (HCC): ICD-10-CM

## 2021-02-26 DIAGNOSIS — Z00.00 ROUTINE GENERAL MEDICAL EXAMINATION AT A HEALTH CARE FACILITY: ICD-10-CM

## 2021-02-26 DIAGNOSIS — F32.A DEPRESSION, UNSPECIFIED DEPRESSION TYPE: ICD-10-CM

## 2021-02-26 DIAGNOSIS — F41.9 ANXIETY: ICD-10-CM

## 2021-02-26 DIAGNOSIS — R60.1 GENERALIZED EDEMA: ICD-10-CM

## 2021-02-26 LAB
A/G RATIO: 1.1 (ref 1.1–2.2)
ALBUMIN SERPL-MCNC: 3.9 G/DL (ref 3.4–5)
ALP BLD-CCNC: 141 U/L (ref 40–129)
ALT SERPL-CCNC: 12 U/L (ref 10–40)
ANION GAP SERPL CALCULATED.3IONS-SCNC: 17 MMOL/L (ref 3–16)
AST SERPL-CCNC: 10 U/L (ref 15–37)
BASOPHILS ABSOLUTE: 0 K/UL (ref 0–0.2)
BASOPHILS RELATIVE PERCENT: 0.5 %
BILIRUB SERPL-MCNC: 0.4 MG/DL (ref 0–1)
BUN BLDV-MCNC: 11 MG/DL (ref 7–20)
CALCIUM SERPL-MCNC: 9.5 MG/DL (ref 8.3–10.6)
CHLORIDE BLD-SCNC: 99 MMOL/L (ref 99–110)
CO2: 23 MMOL/L (ref 21–32)
CREAT SERPL-MCNC: 0.8 MG/DL (ref 0.6–1.2)
EOSINOPHILS ABSOLUTE: 0.1 K/UL (ref 0–0.6)
EOSINOPHILS RELATIVE PERCENT: 1 %
GFR AFRICAN AMERICAN: >60
GFR NON-AFRICAN AMERICAN: >60
GLOBULIN: 3.4 G/DL
GLUCOSE BLD-MCNC: 164 MG/DL (ref 70–99)
HCT VFR BLD CALC: 43 % (ref 36–48)
HEMOGLOBIN: 14 G/DL (ref 12–16)
LYMPHOCYTES ABSOLUTE: 3 K/UL (ref 1–5.1)
LYMPHOCYTES RELATIVE PERCENT: 35.1 %
MCH RBC QN AUTO: 30.2 PG (ref 26–34)
MCHC RBC AUTO-ENTMCNC: 32.7 G/DL (ref 31–36)
MCV RBC AUTO: 92.6 FL (ref 80–100)
MONOCYTES ABSOLUTE: 0.6 K/UL (ref 0–1.3)
MONOCYTES RELATIVE PERCENT: 7.1 %
NEUTROPHILS ABSOLUTE: 4.7 K/UL (ref 1.7–7.7)
NEUTROPHILS RELATIVE PERCENT: 56.3 %
PDW BLD-RTO: 13.8 % (ref 12.4–15.4)
PLATELET # BLD: 241 K/UL (ref 135–450)
PMV BLD AUTO: 12.4 FL (ref 5–10.5)
POTASSIUM SERPL-SCNC: 4.3 MMOL/L (ref 3.5–5.1)
RBC # BLD: 4.64 M/UL (ref 4–5.2)
SODIUM BLD-SCNC: 139 MMOL/L (ref 136–145)
TOTAL PROTEIN: 7.3 G/DL (ref 6.4–8.2)
TSH SERPL DL<=0.05 MIU/L-ACNC: 1.57 UIU/ML (ref 0.27–4.2)
WBC # BLD: 8.4 K/UL (ref 4–11)

## 2021-02-27 LAB
ESTIMATED AVERAGE GLUCOSE: 263.3 MG/DL
HBA1C MFR BLD: 10.8 %

## 2021-03-01 RX ORDER — FLUOXETINE HYDROCHLORIDE 40 MG/1
CAPSULE ORAL
Qty: 90 CAPSULE | Refills: 0 | Status: SHIPPED | OUTPATIENT
Start: 2021-03-01 | End: 2021-05-04 | Stop reason: SDUPTHER

## 2021-03-05 ENCOUNTER — VIRTUAL VISIT (OUTPATIENT)
Dept: INTERNAL MEDICINE CLINIC | Age: 66
End: 2021-03-05
Payer: COMMERCIAL

## 2021-03-05 DIAGNOSIS — R20.0 BILATERAL HAND NUMBNESS: ICD-10-CM

## 2021-03-05 DIAGNOSIS — E11.59 TYPE 2 DIABETES MELLITUS WITH OTHER CIRCULATORY COMPLICATION, WITH LONG-TERM CURRENT USE OF INSULIN (HCC): Primary | ICD-10-CM

## 2021-03-05 DIAGNOSIS — G47.33 OBSTRUCTIVE SLEEP APNEA SYNDROME: ICD-10-CM

## 2021-03-05 DIAGNOSIS — E66.01 MORBID OBESITY WITH BMI OF 50.0-59.9, ADULT (HCC): ICD-10-CM

## 2021-03-05 DIAGNOSIS — Z79.4 TYPE 2 DIABETES MELLITUS WITH OTHER CIRCULATORY COMPLICATION, WITH LONG-TERM CURRENT USE OF INSULIN (HCC): Primary | ICD-10-CM

## 2021-03-05 PROCEDURE — 99396 PREV VISIT EST AGE 40-64: CPT | Performed by: INTERNAL MEDICINE

## 2021-03-05 RX ORDER — GABAPENTIN 100 MG/1
CAPSULE ORAL
Qty: 180 CAPSULE | Refills: 1 | Status: SHIPPED | OUTPATIENT
Start: 2021-03-05 | End: 2021-03-11 | Stop reason: SDUPTHER

## 2021-03-05 ASSESSMENT — ENCOUNTER SYMPTOMS
ABDOMINAL PAIN: 0
COLOR CHANGE: 0
CHEST TIGHTNESS: 0
WHEEZING: 0
BACK PAIN: 0

## 2021-03-05 NOTE — PROGRESS NOTES
12 months. The visit was conducted pursuant to the emergency declaration under the [facility], [address] [place] [address] and the [employer] and [employer] General Act. Patient identification was verified, and a caregiver was present when appropriate. The patient was located in a state where the provider was credentialed to provide care. An electronic signature was used to authenticate this note.     --Yolanda Lacey MD

## 2021-03-08 NOTE — ASSESSMENT & PLAN NOTE
Not taking her insulin regularly!!!  To start and to call Dr. Kyara Casas asap-completely [phone]

## 2021-03-11 DIAGNOSIS — E55.9 VITAMIN D DEFICIENCY: Primary | ICD-10-CM

## 2021-03-11 RX ORDER — GABAPENTIN 100 MG/1
CAPSULE ORAL
Qty: 210 CAPSULE | Refills: 3 | Status: SHIPPED | OUTPATIENT
Start: 2021-03-11 | End: 2021-03-18 | Stop reason: SDUPTHER

## 2021-03-18 RX ORDER — GABAPENTIN 100 MG/1
CAPSULE ORAL
Qty: 630 CAPSULE | Refills: 3 | Status: SHIPPED | OUTPATIENT
Start: 2021-03-18 | End: 2022-07-25

## 2021-05-04 ENCOUNTER — OFFICE VISIT (OUTPATIENT)
Dept: INTERNAL MEDICINE CLINIC | Age: 66
End: 2021-05-04
Payer: COMMERCIAL

## 2021-05-04 VITALS
DIASTOLIC BLOOD PRESSURE: 70 MMHG | TEMPERATURE: 97.3 F | WEIGHT: 293 LBS | BODY MASS INDEX: 45.99 KG/M2 | HEIGHT: 67 IN | SYSTOLIC BLOOD PRESSURE: 138 MMHG

## 2021-05-04 DIAGNOSIS — I10 ESSENTIAL HYPERTENSION: ICD-10-CM

## 2021-05-04 DIAGNOSIS — F32.A DEPRESSION, UNSPECIFIED DEPRESSION TYPE: ICD-10-CM

## 2021-05-04 DIAGNOSIS — M54.50 CHRONIC BILATERAL LOW BACK PAIN WITHOUT SCIATICA: Primary | ICD-10-CM

## 2021-05-04 DIAGNOSIS — E66.01 MORBID OBESITY WITH BMI OF 50.0-59.9, ADULT (HCC): ICD-10-CM

## 2021-05-04 DIAGNOSIS — G47.33 OBSTRUCTIVE SLEEP APNEA SYNDROME: ICD-10-CM

## 2021-05-04 DIAGNOSIS — E11.59 TYPE 2 DIABETES MELLITUS WITH OTHER CIRCULATORY COMPLICATION, WITH LONG-TERM CURRENT USE OF INSULIN (HCC): ICD-10-CM

## 2021-05-04 DIAGNOSIS — Z79.4 TYPE 2 DIABETES MELLITUS WITH OTHER CIRCULATORY COMPLICATION, WITH LONG-TERM CURRENT USE OF INSULIN (HCC): ICD-10-CM

## 2021-05-04 DIAGNOSIS — G89.29 CHRONIC BILATERAL LOW BACK PAIN WITHOUT SCIATICA: Primary | ICD-10-CM

## 2021-05-04 PROBLEM — R10.11 RUQ PAIN: Status: RESOLVED | Noted: 2020-08-28 | Resolved: 2021-05-04

## 2021-05-04 PROCEDURE — 99214 OFFICE O/P EST MOD 30 MIN: CPT | Performed by: INTERNAL MEDICINE

## 2021-05-04 RX ORDER — FLUOXETINE HYDROCHLORIDE 40 MG/1
CAPSULE ORAL
Qty: 90 CAPSULE | Refills: 3 | Status: SHIPPED | OUTPATIENT
Start: 2021-05-04

## 2021-05-04 ASSESSMENT — PATIENT HEALTH QUESTIONNAIRE - PHQ9
1. LITTLE INTEREST OR PLEASURE IN DOING THINGS: 1
SUM OF ALL RESPONSES TO PHQ QUESTIONS 1-9: 1

## 2021-05-04 ASSESSMENT — ENCOUNTER SYMPTOMS
COLOR CHANGE: 0
CHEST TIGHTNESS: 0
WHEEZING: 0
ABDOMINAL PAIN: 0
BACK PAIN: 1

## 2021-05-04 NOTE — PROGRESS NOTES
Subjective:      Patient ID: Chico Ghotra is a 60 y.o. female. Chief Complaint   Patient presents with    Back Pain     recurring back pain     Has gained weight w covid and having severe low back pain- getting spasms even is just standing for short periods of time- wants to be able to be mobile to play w the grandson- no radiation of pain into legs-no leg weakness but only occasional numbness/tingling also in hands and arms- bp's running high at home- bs's also very high- sugars averaging 200+- not really taking her sugars and insulin as regularly as she should- but has been better than she had been doing a few months ago-    Review of Systems   Constitutional: Positive for fatigue. Negative for activity change and appetite change. HENT: Negative for congestion. Eyes: Negative for visual disturbance. Respiratory: Negative for chest tightness and wheezing. Cardiovascular: Negative for chest pain and palpitations. Gastrointestinal: Negative for abdominal pain. Musculoskeletal: Positive for arthralgias, back pain, gait problem and myalgias. Skin: Negative for color change and rash. Neurological: Negative for weakness, light-headedness and headaches. Psychiatric/Behavioral: Positive for dysphoric mood. No family history on file.   Social History     Socioeconomic History    Marital status:      Spouse name: Not on file    Number of children: Not on file    Years of education: Not on file    Highest education level: Not on file   Occupational History    Not on file   Social Needs    Financial resource strain: Not on file    Food insecurity     Worry: Patient refused     Inability: Patient refused    Transportation needs     Medical: Patient refused     Non-medical: Patient refused   Tobacco Use    Smoking status: Former Smoker     Packs/day: 0.50     Years: 10.00     Pack years: 5.00     Types: Cigarettes     Quit date: 1995     Years since quittin.9    Smokeless tobacco: Never Used   Substance and Sexual Activity    Alcohol use: Yes     Comment: once a month    Drug use: No    Sexual activity: Not on file   Lifestyle    Physical activity     Days per week: Not on file     Minutes per session: Not on file    Stress: Not on file   Relationships    Social connections     Talks on phone: Not on file     Gets together: Not on file     Attends Muslim service: Not on file     Active member of club or organization: Not on file     Attends meetings of clubs or organizations: Not on file     Relationship status: Not on file    Intimate partner violence     Fear of current or ex partner: Not on file     Emotionally abused: Not on file     Physically abused: Not on file     Forced sexual activity: Not on file   Other Topics Concern    Not on file   Social History Narrative    Not on file         Objective:   Physical Exam  Constitutional:       Appearance: She is well-developed. She is obese. HENT:      Head: Normocephalic and atraumatic. Eyes:      Conjunctiva/sclera: Conjunctivae normal.      Pupils: Pupils are equal, round, and reactive to light. Neck:      Musculoskeletal: Normal range of motion and neck supple. Cardiovascular:      Rate and Rhythm: Normal rate and regular rhythm. Heart sounds: Normal heart sounds. Pulmonary:      Effort: Pulmonary effort is normal. No respiratory distress. Breath sounds: Normal breath sounds. Abdominal:      General: There is no distension. Tenderness: There is no abdominal tenderness. Musculoskeletal:      Comments: ++ lbp w hyperextension and decreased rom, some pt tenderness along para lumbar spine     Lymphadenopathy:      Cervical: No cervical adenopathy. Skin:     General: Skin is warm and dry. Neurological:      Mental Status: She is alert and oriented to person, place, and time.    Psychiatric:         Mood and Affect: Mood normal.         Behavior: Behavior normal.         Thought Content: Thought content normal.         Judgment: Judgment normal.         DIABETIC FOOT EXAM:   normal DP and PT pulses, no trophic changes or ulcerative lesions -decreased sensation to light touch in upper sole of feet bilaterally     Assessment and Plan:      Hypertension   Increase meds and follow     Morbid obesity with BMI of 50.0-59.9, adult ([address]   To work on diet and consider gastric sleeve     Diabetes mellitus ([address] [facility] [address]   To be more consistent w insulin dosing and try to get in w endo in next few weeks-rechk glyco in next few weeks also as doing sl better    Depression   Cont meds- consider therapy again! Obstructive sleep apnea syndrome   Needs to see sleep specialist!!! Chronic bilateral low back pain without sciatica   See orders for patient and pt also given complete instructions re: course of therapy  ptx-restart diclofenac- loose wt!!!!       Encounter Diagnoses   Name Primary?     Chronic bilateral low back pain without sciatica Yes    Essential hypertension     Morbid obesity with BMI of 50.0-59.9, adult (HCC)     Type 2 diabetes mellitus with other circulatory complication, with long-term current use of insulin (HCC)     Depression, unspecified depression type     Obstructive sleep apnea syndrome        Orders Placed This Encounter   Procedures    [facility]     Referral Priority:   Routine     Referral Type:   Eval and Treat     Referral Reason:   Specialty Services Required     Requested Specialty:   Bariatric Surgery     Number of Visits Requested:   [facility] [place]     Referral Priority:   Routine     Referral Type:   Eval and Treat     Referral Reason:   Specialty Services Required     Requested Specialty:   Physical Therapy     Number of Visits Requested:   1     DIABETES FOOT EXAM

## 2021-05-04 NOTE — ASSESSMENT & PLAN NOTE
To be more consistent w insulin dosing and try to get in w endo in next few weeks-rechk glyco in next few weeks also as doing sl better

## 2021-05-04 NOTE — ASSESSMENT & PLAN NOTE
See orders for patient and pt also given complete instructions re: course of therapy  ptx-restart diclofenac- loose wt!!!!

## 2021-05-04 NOTE — PATIENT INSTRUCTIONS
lower back pressed to the floor. Hold for at least 15 to 30 seconds. 4. Relax, and lower the knee to the starting position. 5. Repeat with the other leg. Repeat 2 to 4 times with each leg. 6. To get more stretch, put your other leg flat on the floor while pulling your knee to your chest.    Curl-ups   1. Lie on the floor on your back with your knees bent at a 90-degree angle. Your feet should be flat on the floor, about 12 inches from your buttocks. 2. Cross your arms over your chest. If this bothers your neck, try putting your hands behind your neck (not your head), with your elbows spread apart. 3. Slowly tighten your belly muscles and raise your shoulder blades off the floor. 4. Keep your head in line with your body, and do not press your chin to your chest.  5. Hold this position for 1 or 2 seconds, then slowly lower yourself back down to the floor. 6. Repeat 8 to 12 times. Pelvic tilt exercise   1. Lie on your back with your knees bent. 2. \"Brace\" your stomach. This means to tighten your muscles by pulling in and imagining your belly button moving toward your spine. You should feel like your back is pressing to the floor and your hips and pelvis are rocking back. 3. Hold for about 6 seconds while you breathe smoothly. 4. Repeat 8 to 12 times. Heel dig bridging   1. Lie on your back with both knees bent and your ankles bent so that only your heels are digging into the floor. Your knees should be bent about 90 degrees. 2. Then push your heels into the floor, squeeze your buttocks, and lift your hips off the floor until your shoulders, hips, and knees are all in a straight line. 3. Hold for about 6 seconds as you continue to breathe normally, and then slowly lower your hips back down to the floor and rest for up to 10 seconds. 4. Do 8 to 12 repetitions. Hamstring stretch in doorway   1. Lie on your back in a doorway, with one leg through the open door.   2. Slide your leg up the wall to straighten your knee. You should feel a gentle stretch down the back of your leg. 3. Hold the stretch for at least 15 to 30 seconds. Do not arch your back, point your toes, or bend either knee. Keep one heel touching the floor and the other heel touching the wall. 4. Repeat with your other leg. 5. Do 2 to 4 times for each leg. Hip flexor stretch   1. Kneel on the floor with one knee bent and one leg behind you. Place your forward knee over your foot. Keep your other knee touching the floor. 2. Slowly push your hips forward until you feel a stretch in the upper thigh of your rear leg. 3. Hold the stretch for at least 15 to 30 seconds. Repeat with your other leg. 4. Do 2 to 4 times on each side. Wall sit   1. Stand with your back 10 to 12 inches away from a wall. 2. Lean into the wall until your back is flat against it. 3. Slowly slide down until your knees are slightly bent, pressing your lower back into the wall. 4. Hold for about 6 seconds, then slide back up the wall. 5. Repeat 8 to 12 times. Follow-up care is a key part of your treatment and safety. Be sure to make and go to all appointments, and call your doctor if you are having problems. It's also a good idea to know your test results and keep a list of the medicines you take. Where can you learn more? Go to [email or web address] and sign in to your [email or web address] account. Enter U268 in the [facility] box to learn more about \"Low Back Pain: Exercises. \"     If you do not have an account, please click on the \"Sign Up Now\" link. Current as of: November 16, 2020               Content Version: 12.8  © 0024-0900 [employer], [employer]. Care instructions adapted under license by [facility] ([facility]). If you have questions about a medical condition or this instruction, always ask your healthcare professional. [address] any warranty or liability for your use of this information.

## 2021-05-10 ENCOUNTER — OFFICE VISIT (OUTPATIENT)
Dept: ENDOCRINOLOGY | Age: 66
End: 2021-05-10
Payer: COMMERCIAL

## 2021-05-10 VITALS
WEIGHT: 293 LBS | RESPIRATION RATE: 17 BRPM | HEIGHT: 67 IN | DIASTOLIC BLOOD PRESSURE: 80 MMHG | BODY MASS INDEX: 45.99 KG/M2 | HEART RATE: 72 BPM | SYSTOLIC BLOOD PRESSURE: 154 MMHG

## 2021-05-10 LAB — HBA1C MFR BLD: 9.5 %

## 2021-05-10 PROCEDURE — 99214 OFFICE O/P EST MOD 30 MIN: CPT | Performed by: INTERNAL MEDICINE

## 2021-05-10 PROCEDURE — 83036 HEMOGLOBIN GLYCOSYLATED A1C: CPT | Performed by: INTERNAL MEDICINE

## 2021-05-10 RX ORDER — INSULIN LISPRO 100 [IU]/ML
INJECTION, SOLUTION INTRAVENOUS; SUBCUTANEOUS
Qty: 45 PEN | Refills: 1 | Status: SHIPPED | OUTPATIENT
Start: 2021-05-10 | End: 2021-05-27 | Stop reason: SDUPTHER

## 2021-05-10 RX ORDER — INSULIN GLARGINE 100 [IU]/ML
INJECTION, SOLUTION SUBCUTANEOUS
Qty: 45 PEN | Refills: 1 | Status: SHIPPED | OUTPATIENT
Start: 2021-05-10 | End: 2021-11-30

## 2021-05-10 NOTE — PROGRESS NOTES
Multinodular goiter 2010    Personal history of colonic polyps     Dr. Julianna Wood- due for repeat colonoscopy     Positive PPD, treated     INH completed 2001    S/P cholecystectomy 2011    Type II or unspecified type diabetes mellitus without mention of complication, not stated as uncontrolled      Past Surgical History:   Procedure Laterality Date     SECTION N/A 30 plus years ago    x2    CHOLECYSTECTOMY      3/2009    COLONOSCOPY      2012 Dr. Baldomero Mathews 5 yrs    COLONOSCOPY      repeat x 3 yrs HAD 5 POLPYS    HYSTEROSCOPY  2015    HYSTEROSCOPY, DILATATION AND CURETTAGE WITH NOVASURE ABLATION AND ABLATION    MYOMECTOMY      2003    TIBIA FRACTURE SURGERY      2006 w/ revision     Current Outpatient Medications   Medication Sig Dispense Refill    cloNIDine (CATAPRES) 0.1 MG tablet Take 1 tablet by mouth every 8 hours 270 tablet 3    diclofenac sodium 1 % GEL Apply 4 g topically 4 times daily 3 Tube 5    buPROPion (WELLBUTRIN XL) 300 MG extended release tablet Take 1 tablet by mouth every morning 90 tablet 3    chlorthalidone (HYGROTON) 25 MG tablet Take 1 tablet by mouth daily 30 tablet 3    busPIRone (BUSPAR) 15 MG tablet Take 15 mg by mouth 3 times daily as needed (anxiety) 90 tablet 3    atorvastatin (LIPITOR) 40 MG tablet TAKE 1 TABLET DAILY 90 tablet 3    FLUoxetine (PROZAC) 40 MG capsule TAKE 1 CAPSULE DAILY 90 capsule 3    Dulaglutide (TRULICITY) 3.48 FN/7.8NB SOPN 0.75 mg weekly 12 pen 3    insulin regular human (HUMULIN R U-500 KWIKPEN) 500 UNIT/ML SOPN concentrated injection pen  units before breakfast and dinner 18 pen 1    betamethasone dipropionate (DIPROLENE) 0.05 % ointment APPLY TOPICALLY DAILY 90 g 3    verapamil (CALAN SR) 240 MG extended release tablet TAKE 1 TABLET NIGHTLY 90 tablet 3    metoprolol succinate (TOPROL XL) 50 MG extended release tablet Take 1 tablet by mouth nightly 30 tablet 3    losartan (COZAAR) 100 MG tablet TAKE 1 TABLET DAILY 90 tablet 4    montelukast (SINGULAIR) 10 MG tablet Take 1 tablet by mouth nightly 90 tablet 3    Insulin Pen Needle 32G X 4 MM MISC 1 each by Does not apply route daily 100 each 3    loratadine (CLARITIN) 10 MG tablet Take 10 mg by mouth daily      ONE TOUCH LANCETS MISC 1 each by Does not apply route daily 100 each 3     No current facility-administered medications for this visit. Review of Systems    Scanned, reviewed    Vitals:    05/10/21 1549   BP: (!) 154/80   Pulse: 72   Resp: 17       Constitutional: Well-developed, appears stated age, cooperative, in no acute distress  H/E/N/M/T:atraumatic, normocephalic, external ears, nose, lips normal without lesions  Eyes: Lids, lashes, conjunctivae and sclerae normal, No proptosis, no redness  Neck: supple, symmetrical, no swelling  Skin: No obvious rashes or lesions present.   Skin and hair texture normal  Psychiatric: Judgement and Insight:  judgement and insight appear normal  Neuro: Normal without focal findings, speech is normal normal, speech is spontaneous  Chest: No labored breathing, no chest deformity, no stridor  Musculoskeletal: No joint deformity, swelling      5/21  Skeletal foot exam is normal, no skin lesions, toenails are normal,10 g monofilament is 10/10 on the right and 10/10 on the left  Right sole callus/thickening, no active discharge, no redness    Lab Reviewed   No components found for: CHLPL  Lab Results   Component Value Date    TRIG 54 03/14/2020    TRIG 75 01/15/2019    TRIG 60 09/21/2017     Lab Results   Component Value Date    HDL 55 03/14/2020    HDL 63 (H) 01/15/2019    HDL 66 (H) 09/21/2017     Lab Results   Component Value Date    LDLCALC 58 03/14/2020    LDLCALC 83 01/15/2019    LDLCALC 51 09/21/2017     Lab Results   Component Value Date    LABVLDL 11 03/14/2020    LABVLDL 15 01/15/2019    LABVLDL 12 09/21/2017     Lab Results   Component Value Date    LABA1C 10.5 12/31/2019       Assessment:     Ángel Maria

## 2021-05-27 RX ORDER — INSULIN LISPRO 100 [IU]/ML
INJECTION, SOLUTION INTRAVENOUS; SUBCUTANEOUS
Qty: 45 PEN | Refills: 1 | Status: SHIPPED | OUTPATIENT
Start: 2021-05-27 | End: 2021-09-28 | Stop reason: SDUPTHER

## 2021-07-07 ENCOUNTER — VIRTUAL VISIT (OUTPATIENT)
Dept: INTERNAL MEDICINE CLINIC | Age: 66
End: 2021-07-07
Payer: COMMERCIAL

## 2021-07-07 DIAGNOSIS — G89.29 CHRONIC MIDLINE THORACIC BACK PAIN: ICD-10-CM

## 2021-07-07 DIAGNOSIS — M54.6 CHRONIC MIDLINE THORACIC BACK PAIN: ICD-10-CM

## 2021-07-07 DIAGNOSIS — R06.02 SOB (SHORTNESS OF BREATH): ICD-10-CM

## 2021-07-07 DIAGNOSIS — E04.2 MULTIPLE THYROID NODULES: ICD-10-CM

## 2021-07-07 DIAGNOSIS — E66.01 MORBID OBESITY WITH BMI OF 50.0-59.9, ADULT (HCC): ICD-10-CM

## 2021-07-07 DIAGNOSIS — I10 ESSENTIAL HYPERTENSION: ICD-10-CM

## 2021-07-07 DIAGNOSIS — R06.09 DOE (DYSPNEA ON EXERTION): ICD-10-CM

## 2021-07-07 DIAGNOSIS — I20.8 OTHER FORMS OF ANGINA PECTORIS (HCC): Primary | ICD-10-CM

## 2021-07-07 DIAGNOSIS — E78.00 PURE HYPERCHOLESTEROLEMIA: ICD-10-CM

## 2021-07-07 PROCEDURE — 99214 OFFICE O/P EST MOD 30 MIN: CPT | Performed by: INTERNAL MEDICINE

## 2021-07-07 RX ORDER — DOBUTAMINE HYDROCHLORIDE 200 MG/100ML
10 INJECTION INTRAVENOUS CONTINUOUS
Status: CANCELLED | OUTPATIENT
Start: 2021-07-07

## 2021-07-07 RX ORDER — CLONIDINE HYDROCHLORIDE 0.1 MG/1
0.2 TABLET ORAL 2 TIMES DAILY
Qty: 360 TABLET | Refills: 3 | Status: SHIPPED | OUTPATIENT
Start: 2021-07-07 | End: 2021-11-08 | Stop reason: SDUPTHER

## 2021-07-07 RX ORDER — FUROSEMIDE 20 MG/1
TABLET ORAL
Qty: 60 TABLET | Refills: 3 | Status: SHIPPED | OUTPATIENT
Start: 2021-07-07

## 2021-07-07 SDOH — ECONOMIC STABILITY: FOOD INSECURITY: WITHIN THE PAST 12 MONTHS, THE FOOD YOU BOUGHT JUST DIDN'T LAST AND YOU DIDN'T HAVE MONEY TO GET MORE.: NEVER TRUE

## 2021-07-07 SDOH — ECONOMIC STABILITY: FOOD INSECURITY: WITHIN THE PAST 12 MONTHS, YOU WORRIED THAT YOUR FOOD WOULD RUN OUT BEFORE YOU GOT MONEY TO BUY MORE.: NEVER TRUE

## 2021-07-07 ASSESSMENT — PATIENT HEALTH QUESTIONNAIRE - PHQ9
SUM OF ALL RESPONSES TO PHQ QUESTIONS 1-9: 0
SUM OF ALL RESPONSES TO PHQ9 QUESTIONS 1 & 2: 0
2. FEELING DOWN, DEPRESSED OR HOPELESS: 0
1. LITTLE INTEREST OR PLEASURE IN DOING THINGS: 0

## 2021-07-07 ASSESSMENT — ENCOUNTER SYMPTOMS
ABDOMINAL PAIN: 0
COLOR CHANGE: 0
BACK PAIN: 0
SHORTNESS OF BREATH: 1
COUGH: 0
WHEEZING: 0
CHEST TIGHTNESS: 0

## 2021-07-07 ASSESSMENT — SOCIAL DETERMINANTS OF HEALTH (SDOH): HOW HARD IS IT FOR YOU TO PAY FOR THE VERY BASICS LIKE FOOD, HOUSING, MEDICAL CARE, AND HEATING?: NOT HARD AT ALL

## 2021-07-07 NOTE — ASSESSMENT & PLAN NOTE
New onset and concerning w chest pain and palpitations- pt advised to go to ER if pain or palpitations recur- will do stat stress echo and have her see cardiology asap- got her in w Dr. Jaky Nichols next week

## 2021-07-07 NOTE — PROGRESS NOTES
Pharmacological Stress Test; Future  -     Cardiac Stress Test- W Pharm; Future  5. Essential hypertension  Assessment & Plan:   Controlled w current regimen- continue and monitor closely    Orders:  -     TSH without Reflex; Future  -     Comprehensive Metabolic Panel; Future  -     CBC Auto Differential; Future  -     Lipid Panel; Future  -     BRAIN NATRIURETIC PEPTIDE (BNP); Future  -     furosemide (LASIX) 20 MG tablet; 2 daily until edema is improved then 1 daily, Disp-60 tablet, R-3Normal  -     Echo Pharmacological Stress Test; Future  -     Cardiac Stress Test- W Pharm; Future  6. Pure hypercholesterolemia  -     TSH without Reflex; Future  -     Comprehensive Metabolic Panel; Future  -     CBC Auto Differential; Future  -     Lipid Panel; Future  -     BRAIN NATRIURETIC PEPTIDE (BNP); Future  -     furosemide (LASIX) 20 MG tablet; 2 daily until edema is improved then 1 daily, Disp-60 tablet, R-3Normal  -     Echo Pharmacological Stress Test; Future  -     Cardiac Stress Test- W Pharm; Future  7. SOB (shortness of breath)  Assessment & Plan:   New onset and concerning w chest pain and palpitations- pt advised to go to ER if pain or palpitations recur- will do stat stress echo and have her see cardiology asap- got her in w Dr. Dimple Saldana next week  8. Chronic midline thoracic back pain  Assessment & Plan:   ?? Angina equivalent? Check stress testing       No follow-ups on file. SUBJECTIVE/OBJECTIVE:  bp's better-in 130-135 range- no progress w wt loss- has been having for the past 2 weeks a feeling of light-headedness- some pain bt shoulders- having some increased sob- increased edema- pain bt shoulders is not exertional but worrisome- also having some palpitations- sugars still too high but working w endo       Review of Systems   Constitutional: Positive for fatigue. Negative for activity change and appetite change. HENT: Negative for congestion. Eyes: Negative for visual disturbance.    Respiratory: Positive for shortness of breath. Negative for cough, chest tightness and wheezing. Cardiovascular: Positive for chest pain, palpitations and leg swelling. Gastrointestinal: Negative for abdominal pain. Musculoskeletal: Negative for arthralgias and back pain. Skin: Negative for color change and rash. Neurological: Positive for weakness. Negative for light-headedness and headaches. Psychiatric/Behavioral: Positive for sleep disturbance.        Patient-Reported Vitals 7/7/2021   Patient-Reported Weight 357lb   Patient-Reported Systolic 825   Patient-Reported Diastolic 71   Patient-Reported Temperature 98.0        Physical Exam    [INSTRUCTIONS:  \"[x]\" Indicates a positive item  \"[]\" Indicates a negative item  -- DELETE ALL ITEMS NOT EXAMINED]    Constitutional: [x] Appears well-developed and well-nourished [x] No apparent distress      [] Abnormal -     Mental status: [x] Alert and awake  [x] Oriented to person/place/time [x] Able to follow commands    [] Abnormal -     Eyes:   EOM    [x]  Normal    [] Abnormal -   Sclera  [x]  Normal    [] Abnormal -          Discharge [x]  None visible   [] Abnormal -     HENT: [x] Normocephalic, atraumatic  [] Abnormal -   [x] Mouth/Throat: Mucous membranes are moist    External Ears [x] Normal  [] Abnormal -    Neck: [x] No visualized mass [] Abnormal -     Pulmonary/Chest: [x] Respiratory effort normal   [x] No visualized signs of difficulty breathing or respiratory distress        [] Abnormal -      Musculoskeletal:   [x] Normal gait with no signs of ataxia         [x] Normal range of motion of neck        [] Abnormal -     Neurological:        [x] No Facial Asymmetry (Cranial nerve 7 motor function) (limited exam due to video visit)          [x] No gaze palsy        [] Abnormal -          Skin:        [x] No significant exanthematous lesions or discoloration noted on facial skin         [] Abnormal -            Psychiatric:       [x] Normal Affect [] Abnormal - [x] No Hallucinations    Other pertinent observable physical exam findings:-          Lucien Arellano, was evaluated through a synchronous (real-time) audio-video encounter. The patient (or guardian if applicable) is aware that this is a billable service. Verbal consent to proceed has been obtained within the past 12 months. The visit was conducted pursuant to the emergency declaration under the [address] [place] [address] and the [employer] Act. Patient identification was verified, and a caregiver was present when appropriate. The patient was located in a state where the provider was credentialed to provide care. An electronic signature was used to authenticate this note.     --Jani Baht MD

## 2021-07-09 ENCOUNTER — VIRTUAL VISIT (OUTPATIENT)
Dept: ENDOCRINOLOGY | Age: 66
End: 2021-07-09
Payer: COMMERCIAL

## 2021-07-09 DIAGNOSIS — E11.65 UNCONTROLLED TYPE 2 DIABETES MELLITUS WITH HYPERGLYCEMIA (HCC): Primary | ICD-10-CM

## 2021-07-09 PROCEDURE — 99214 OFFICE O/P EST MOD 30 MIN: CPT | Performed by: INTERNAL MEDICINE

## 2021-07-09 NOTE — PROGRESS NOTES
Seen as f/u patient for diabetes      Pursuant to the emergency declaration under the [facility], [facility] waiver authority and the [employer] Act, this Virtual  Visit was conducted, with patient's consent, to reduce the patient's risk of exposure to COVID-19 and provide continuity of care for an established patient. Patient was at home. Provider was at home. No one else was involved  Services were provided through a video synchronous discussion virtually to substitute for in-person clinic visit. Interm:    She will seeing cardiology due to SOB    Meal were irregular  back to basal bolus      Diagnosed with Type 2 diabetes mellitus 8 years ago  Known diabetic complications: Retinopathy NPDR    Current diabetic medications    Lantus 60/60   Humalog 40/40/40 + SSI    Previous:    U-500 70/80/70  But taking only with lunch n dinner   SSI 5 for 86>076  Trulicity    Moderate, uncontrolled    Previous:    Lantus 70/70    But missing second shot 3 days a week   But doing 66/66  Humalog 38 units AC TID, taking it 2/week    But doing 34   SSI 2 for 50>150  She has relative hypoglycemia in the 70s    H/o use of lantus, novolog, victoza, januvia    Metformin and ER caused GI symptoms    Last A1c 9.5%<----- 9.2%<----10.5%<-----9.3%<------9.9%<-----9.2%<------10.2 on 5/17<--- 8.4 on 12/16<-----7.8 on 6/16<------ 9.6 on 2/16<---- 10.5 on 10/15<-----11.8 on 8/15<---14.2 <--- 10.6 <--- 9.4    Prior visit with dietician: Yes   Current diet: on average, 3 meals per day does not follow low CHO  Current exercise: walking   Current monitoring regimen: home blood tests  She checks upto-5/day    Has brought blood glucose log/meter:   Home blood sugar records: 100-231  Any episodes of hypoglycemia?  44    No Hx of CAD , PVD, CVA    Hyperlipidemia:controlled on statin LDL 51 on 9/17  Simvastatin 40mg  LDL 58 on 3/20  Last eye exam: 2/20  Last foot exam:   Last microalbumin to creatinine ratio:    HTN: On multiple medications, taking toprol 50mg  Losartan 100mg verapamil 240mg Chlorthalidone 25mg clonidine 0.1mg TID  Uncontrolled    She has thyroid nodules, not on medication    Past Medical History:   Diagnosis Date    Anxiety and depression     Asthma     Edema     GERD (gastroesophageal reflux disease)     Hyperlipidemia     Hypertension     IBS (irritable bowel syndrome)     Irregular menstrual bleeding 2011    Dr. Trisha He obesity ([facility] [address]     Multinodular goiter 2010    Personal history of colonic polyps     Dr. Marx Freeze- due for repeat colonoscopy     Positive PPD, treated     INH completed 2001    S/P cholecystectomy 2011    Type II or unspecified type diabetes mellitus without mention of complication, not stated as uncontrolled      Past Surgical History:   Procedure Laterality Date     SECTION N/A 30 plus years ago    x2    CHOLECYSTECTOMY      3/2009    COLONOSCOPY      2012 Dr. Analilia Oglesby 5 yrs    COLONOSCOPY      repeat x 3 yrs HAD 5 POLPYS    HYSTEROSCOPY  2015    HYSTEROSCOPY, DILATATION AND CURETTAGE WITH NOVASURE ABLATION AND ABLATION    MYOMECTOMY      2003    TIBIA FRACTURE SURGERY      2006 w/ revision     Current Outpatient Medications   Medication Sig Dispense Refill    cloNIDine (CATAPRES) 0.1 MG tablet Take 1 tablet by mouth every 8 hours 270 tablet 3    diclofenac sodium 1 % GEL Apply 4 g topically 4 times daily 3 Tube 5    buPROPion (WELLBUTRIN XL) 300 MG extended release tablet Take 1 tablet by mouth every morning 90 tablet 3    chlorthalidone (HYGROTON) 25 MG tablet Take 1 tablet by mouth daily 30 tablet 3    busPIRone (BUSPAR) 15 MG tablet Take 15 mg by mouth 3 times daily as needed (anxiety) 90 tablet 3    atorvastatin (LIPITOR) 40 MG tablet TAKE 1 TABLET DAILY 90 tablet 3    FLUoxetine (PROZAC) 40 MG capsule TAKE 1 CAPSULE DAILY 90 capsule 3    Dulaglutide (TRULICITY) 9.40 HY/6.2HZ SOPN 0.75 mg weekly 12 pen 3    insulin regular human (HUMULIN R U-500 KWIKPEN) 500 UNIT/ML SOPN concentrated injection pen  units before breakfast and dinner 18 pen 1    betamethasone dipropionate (DIPROLENE) 0.05 % ointment APPLY TOPICALLY DAILY 90 g 3    verapamil (CALAN SR) 240 MG extended release tablet TAKE 1 TABLET NIGHTLY 90 tablet 3    metoprolol succinate (TOPROL XL) 50 MG extended release tablet Take 1 tablet by mouth nightly 30 tablet 3    losartan (COZAAR) 100 MG tablet TAKE 1 TABLET DAILY 90 tablet 4    montelukast (SINGULAIR) 10 MG tablet Take 1 tablet by mouth nightly 90 tablet 3    Insulin Pen Needle 32G X 4 MM MISC 1 each by Does not apply route daily 100 each 3    loratadine (CLARITIN) 10 MG tablet Take 10 mg by mouth daily      ONE TOUCH LANCETS MISC 1 each by Does not apply route daily 100 each 3     No current facility-administered medications for this visit. Review of Systems    Constitutional: Negative for weight loss and malaise/fatigue. Negative for fever and chills. HENT: Negative for hearing loss, ear pain, nosebleeds, neck pain and tinnitus. Eyes: Negative for blurred vision. Negative for double vision, photophobia and pain. Respiratory: Negative for cough and sputum production. +SOB  Cardiovascular:+ for chest pain, - palpitations and leg swelling. Gastrointestinal: Negative for nausea, vomiting and abdominal pain. Genitourinary: Negative for dysuria, urgency and frequency. Musculoskeletal: Negative for back pain. No joint pain  Skin: Negative for itching and rash. Neurological: Negative for dizziness. Negative for tingling, tremors, focal weakness and headaches. Endo/Heme/Allergies: see HPI  Psychiatric/Behavioral: + for depression and - substance abuse.          PHYSICAL EXAMINATION:  [ INSTRUCTIONS:  \"[x]\" Indicates a positive item  \"[]\" Indicates a negative item  -- DELETE ALL ITEMS NOT EXAMINED]  Vital Signs: (As obtained by patient/caregiver or practitioner observation)    Blood pressure-  Heart rate-    Respiratory rate-    Temperature-  Pulse oximetry-     Constitutional Appears well-developed and well-nourished No apparent distress        Mental status  Alert and awake  Oriented to person/place/time  Able to follow commands      Eyes:  EOM    [x]  Normal    Sclera  [x]  Normal           Discharge [x]  None visible      HENT:   [x] Normocephalic, atraumatic. [x] Mouth/Throat: Mucous membranes are moist.     External Ears [x] Normal  no discharge    Neck: [x] No visualized mass  no swelling    Pulmonary/Chest: [x] Respiratory effort normal.  [x] No visualized signs of difficulty breathing or respiratory distress             Musculoskeletal:   [x] Normal gait with no signs of ataxia         [x] Normal range of motion of neck          Head and neck stable, appears normal ROM, strength good    Neurological:        [x] No Facial Asymmetry (Cranial nerve 7 motor function) (limited exam to video visit)          [x] No gaze palsy                Skin:        [x] No significant exanthematous lesions or discoloration noted on facial skin                 Psychiatric:       [x] Normal Affect [x] No Hallucinations            Other pertinent observable physical exam findings-     Due to this being a TeleHealth encounter, evaluation of the following organ systems is limited: Vitals/Constitutional/EENT/Resp/CV/GI//MS/Neuro/Skin/Heme-Lymph-Imm. Services were provided through a video synchronous discussion virtually to substitute for in-person clinic visit.            5/21  Skeletal foot exam is normal, no skin lesions, toenails are normal,10 g monofilament is 10/10 on the right and 10/10 on the left  Right sole callus/thickening, no active discharge, no redness    Lab Reviewed   No components found for: CHLPL  Lab Results   Component Value Date    TRIG 54 03/14/2020    TRIG 75 01/15/2019    TRIG 60 09/21/2017     Lab Results   Component Value Date    HDL 55 03/14/2020    HDL 63 (H) 01/15/2019    HDL 66 (H) 09/21/2017     Lab Results   Component Value Date    LDLCALC 58 03/14/2020    LDLCALC 83 01/15/2019    LDLCALC 51 09/21/2017     Lab Results   Component Value Date    LABVLDL 11 03/14/2020    LABVLDL 15 01/15/2019    LABVLDL 12 09/21/2017     Lab Results   Component Value Date    LABA1C 10.5 12/31/2019       Assessment:     Westley Springer is a 62 y.o. female with :    1.T2DM: Longstanding, uncontrolled. Discussed goals, provided education. Given her A1c and hyperglycemia,switched to U-500 , discussed risk of lows, needs self monitoring. Advised to take insulin 30 min before the meal.Meal pattern were not regular, may take after meals. A1c high so Switched back to basal bolus  Hyperglycemia when misses insulin. Advised to take regularly  CGM not covered, order again as checking 5/day at times, she may pick sample  2. HTN. BP high. Aldosterone and renin nl, lab did not check MN. May need aldactone  3. HLD: LDL at goal, on statin  4. Obesity: Discussed weight loss, refer to bariatrics  5. Depression  6. MNG: Per history-h/o FNA, reports FNA was benign years ago, will need USG, did not have done  7. ADELINA  8. Vitamin D deficiency    Plan:      Check A1c   Lantus 60/60   Humalog 40/40/40 + SSI   Advise to check blood sugar 4 times a day   Patient to send blood sugar log for titration. Advise to low simple carbohydrate and protein with each  meal diet. Diabetes Care: recommend yearly eye exam, foot exam and urine microalbumin to   creatinine ratio.       -Hyperlipidemia, LDL goal is <100 mg/dl   -Hypertension; Continue same  -Daily ASA: Added 81mg   -Smoking status: Non smoker

## 2021-07-13 ENCOUNTER — TELEPHONE (OUTPATIENT)
Dept: INTERNAL MEDICINE CLINIC | Age: 66
End: 2021-07-13

## 2021-07-13 NOTE — TELEPHONE ENCOUNTER
----- Message from Mark Dickey sent at 7/13/2021  2:36 PM EDT -----  Subject: Message to Provider    QUESTIONS  Information for Provider? pt has a message for Dakotah Singh. She says she is   scheduled for a stress test Monday and Tuesday. She needs to know if she   should stop taking her medications and if so, which ones. she says please   respond to Fleecsjennifert  ---------------------------------------------------------------------------  --------------  CALL BACK INFO  What is the best way for the office to contact you? Do not leave any   message, patient will call back for answer  Preferred Call Back Phone Number? [phone]  ---------------------------------------------------------------------------  --------------  SCRIPT ANSWERS  Relationship to Patient?  Self

## 2021-07-19 ENCOUNTER — HOSPITAL ENCOUNTER (OUTPATIENT)
Dept: NON INVASIVE DIAGNOSTICS | Age: 66
Discharge: HOME | End: 2021-07-19
Payer: COMMERCIAL

## 2021-07-19 DIAGNOSIS — R06.09 DOE (DYSPNEA ON EXERTION): ICD-10-CM

## 2021-07-19 DIAGNOSIS — I10 ESSENTIAL HYPERTENSION: ICD-10-CM

## 2021-07-19 DIAGNOSIS — E04.2 MULTIPLE THYROID NODULES: ICD-10-CM

## 2021-07-19 DIAGNOSIS — E66.01 MORBID OBESITY WITH BMI OF 50.0-59.9, ADULT (HCC): ICD-10-CM

## 2021-07-19 DIAGNOSIS — E78.00 PURE HYPERCHOLESTEROLEMIA: ICD-10-CM

## 2021-07-19 DIAGNOSIS — I20.8 OTHER FORMS OF ANGINA PECTORIS (HCC): ICD-10-CM

## 2021-07-19 LAB
LV EF: 73 %
LVEF MODALITY: NORMAL

## 2021-07-19 PROCEDURE — 3430000000 HC RX DIAGNOSTIC RADIOPHARMACEUTICAL: Performed by: INTERNAL MEDICINE

## 2021-07-19 PROCEDURE — 93017 CV STRESS TEST TRACING ONLY: CPT

## 2021-07-19 PROCEDURE — 78452 HT MUSCLE IMAGE SPECT MULT: CPT

## 2021-07-19 PROCEDURE — A9502 TC99M TETROFOSMIN: HCPCS | Performed by: INTERNAL MEDICINE

## 2021-07-19 PROCEDURE — 2580000003 HC RX 258: Performed by: INTERNAL MEDICINE

## 2021-07-19 PROCEDURE — 6360000002 HC RX W HCPCS: Performed by: INTERNAL MEDICINE

## 2021-07-19 RX ORDER — 0.9 % SODIUM CHLORIDE 0.9 %
10 VIAL (ML) INJECTION
Status: COMPLETED | OUTPATIENT
Start: 2021-07-19 | End: 2021-07-19

## 2021-07-19 RX ADMIN — SODIUM CHLORIDE 10 ML: 9 INJECTION INTRAVENOUS at 11:22

## 2021-07-19 RX ADMIN — TETROFOSMIN 30 MILLICURIE: 1.38 INJECTION, POWDER, LYOPHILIZED, FOR SOLUTION INTRAVENOUS at 11:21

## 2021-07-19 RX ADMIN — REGADENOSON 0.4 MG: 0.08 INJECTION, SOLUTION INTRAVENOUS at 11:22

## 2021-07-20 ENCOUNTER — HOSPITAL ENCOUNTER (OUTPATIENT)
Dept: NON INVASIVE DIAGNOSTICS | Age: 66
Discharge: HOME | End: 2021-07-20
Payer: COMMERCIAL

## 2021-07-20 PROCEDURE — 2580000003 HC RX 258: Performed by: INTERNAL MEDICINE

## 2021-07-20 PROCEDURE — 3430000000 HC RX DIAGNOSTIC RADIOPHARMACEUTICAL: Performed by: INTERNAL MEDICINE

## 2021-07-20 PROCEDURE — A9502 TC99M TETROFOSMIN: HCPCS | Performed by: INTERNAL MEDICINE

## 2021-07-20 RX ORDER — SODIUM CHLORIDE 0.9 % (FLUSH) 0.9 %
5-40 SYRINGE (ML) INJECTION PRN
Status: DISCONTINUED | OUTPATIENT
Start: 2021-07-20 | End: 2021-07-21 | Stop reason: HOSPADM

## 2021-07-20 RX ADMIN — TETROFOSMIN 30 MILLICURIE: 1.38 INJECTION, POWDER, LYOPHILIZED, FOR SOLUTION INTRAVENOUS at 09:50

## 2021-07-20 RX ADMIN — Medication 10 ML: at 09:51

## 2021-08-10 RX ORDER — CHLORTHALIDONE 25 MG/1
TABLET ORAL
Qty: 30 TABLET | Refills: 11 | Status: SHIPPED | OUTPATIENT
Start: 2021-08-10 | End: 2022-09-28 | Stop reason: SDUPTHER

## 2021-08-16 ENCOUNTER — NURSE TRIAGE (OUTPATIENT)
Dept: OTHER | Facility: CLINIC | Age: 66
End: 2021-08-16

## 2021-08-16 NOTE — TELEPHONE ENCOUNTER
Reason for Disposition   Age > 48 and no history of prior similar back pain    Answer Assessment - Initial Assessment Questions  1. ONSET: \"When did the pain begin? \"       Today    2. LOCATION: \"Where does it hurt? \" (upper, mid or lower back)      Lower back    3. SEVERITY: \"How bad is the pain? \"  (e.g., Scale 1-10; mild, moderate, or severe)    - MILD (1-3): doesn't interfere with normal activities     - MODERATE (4-7): interferes with normal activities or awakens from sleep     - SEVERE (8-10): excruciating pain, unable to do any normal activities       6/10    4. PATTERN: \"Is the pain constant? \" (e.g., yes, no; constant, intermittent)       Constant    5. RADIATION: \"Does the pain shoot into your legs or elsewhere? \"      Denies    6. CAUSE:  \"What do you think is causing the back pain? \"       slipped and fell yesterday    7. BACK OVERUSE:  Mary Sameer recent lifting of heavy objects, strenuous work or exercise? \"      Denies    8. MEDICATIONS: \"What have you taken so far for the pain? \" (e.g., nothing, acetaminophen, NSAIDS)     Motrin    9. NEUROLOGIC SYMPTOMS: \"Do you have any weakness, numbness, or problems with bowel/bladder control? \"      Denies    10. OTHER SYMPTOMS: \"Do you have any other symptoms? \" (e.g., fever, abdominal pain, burning with urination, blood in urine)       Denies     11. PREGNANCY: \"Is there any chance you are pregnant? \" (e.g., yes, no; LMP)       N/a    Protocols used: BACK PAIN-ADULT-[place]    Received call from Oneyda at [facility] with Red Flag Complaint. Brief description of triage: as above low back pain started this am states slipped and fell yesterday has had this before no numbness or tingling hard to walk    Triage indicates for patient to be seen today or tomorrow if unable to go to ucc or er    Care advice provided, patient verbalizes understanding; denies any other questions or concerns; instructed to call back for any new or worsening symptoms.     Writer provided warm transfer to April at [facility] for appointment scheduling. Attention Provider: Thank you for allowing me to participate in the care of your patient. The patient was connected to triage in response to information provided to the ECC. Please do not respond through this encounter as the response is not directed to a shared pool.

## 2021-08-17 ENCOUNTER — VIRTUAL VISIT (OUTPATIENT)
Dept: INTERNAL MEDICINE CLINIC | Age: 66
End: 2021-08-17
Payer: COMMERCIAL

## 2021-08-17 DIAGNOSIS — M54.50 CHRONIC BILATERAL LOW BACK PAIN WITHOUT SCIATICA: Primary | ICD-10-CM

## 2021-08-17 DIAGNOSIS — E11.59 TYPE 2 DIABETES MELLITUS WITH OTHER CIRCULATORY COMPLICATION, WITH LONG-TERM CURRENT USE OF INSULIN (HCC): ICD-10-CM

## 2021-08-17 DIAGNOSIS — G89.29 CHRONIC BILATERAL LOW BACK PAIN WITHOUT SCIATICA: Primary | ICD-10-CM

## 2021-08-17 DIAGNOSIS — E66.01 MORBID OBESITY WITH BMI OF 50.0-59.9, ADULT (HCC): ICD-10-CM

## 2021-08-17 DIAGNOSIS — I10 ESSENTIAL HYPERTENSION: ICD-10-CM

## 2021-08-17 DIAGNOSIS — Z79.4 TYPE 2 DIABETES MELLITUS WITH OTHER CIRCULATORY COMPLICATION, WITH LONG-TERM CURRENT USE OF INSULIN (HCC): ICD-10-CM

## 2021-08-17 DIAGNOSIS — G47.33 OBSTRUCTIVE SLEEP APNEA SYNDROME: ICD-10-CM

## 2021-08-17 DIAGNOSIS — E78.00 PURE HYPERCHOLESTEROLEMIA: ICD-10-CM

## 2021-08-17 PROCEDURE — 99214 OFFICE O/P EST MOD 30 MIN: CPT | Performed by: INTERNAL MEDICINE

## 2021-08-17 RX ORDER — PREDNISONE 20 MG/1
40 TABLET ORAL DAILY
Qty: 14 TABLET | Refills: 0 | Status: SHIPPED | OUTPATIENT
Start: 2021-08-17 | End: 2021-08-24

## 2021-08-17 RX ORDER — CYCLOBENZAPRINE HCL 10 MG
10 TABLET ORAL 3 TIMES DAILY PRN
Qty: 30 TABLET | Refills: 0 | Status: SHIPPED | OUTPATIENT
Start: 2021-08-17 | End: 2022-04-01 | Stop reason: SDUPTHER

## 2021-08-17 ASSESSMENT — PATIENT HEALTH QUESTIONNAIRE - PHQ9
SUM OF ALL RESPONSES TO PHQ9 QUESTIONS 1 & 2: 0
2. FEELING DOWN, DEPRESSED OR HOPELESS: 0
SUM OF ALL RESPONSES TO PHQ QUESTIONS 1-9: 0
1. LITTLE INTEREST OR PLEASURE IN DOING THINGS: 0

## 2021-08-17 ASSESSMENT — ENCOUNTER SYMPTOMS
CHEST TIGHTNESS: 0
ABDOMINAL PAIN: 0
BACK PAIN: 1
COLOR CHANGE: 0
WHEEZING: 0

## 2021-08-17 NOTE — PROGRESS NOTES
palpitations. Gastrointestinal: Negative for abdominal pain. Musculoskeletal: Positive for arthralgias, back pain and gait problem. Skin: Negative for color change and rash. Neurological: Negative for weakness, light-headedness and headaches. Psychiatric/Behavioral: Positive for sleep disturbance.        Patient-Reported Vitals 8/17/2021   Patient-Reported Weight 357lb   Patient-Reported Systolic 272   Patient-Reported Diastolic 76   Patient-Reported Temperature 98.0        Physical Exam    [INSTRUCTIONS:  \"[x]\" Indicates a positive item  \"[]\" Indicates a negative item  -- DELETE ALL ITEMS NOT EXAMINED]    Constitutional: [x] Appears well-developed and well-nourished [x] No apparent distress      [x] Abnormal - morbidly obese    Mental status: [x] Alert and awake  [x] Oriented to person/place/time [x] Able to follow commands    [] Abnormal -     Eyes:   EOM    [x]  Normal    [] Abnormal -   Sclera  [x]  Normal    [] Abnormal -          Discharge [x]  None visible   [] Abnormal -     HENT: [x] Normocephalic, atraumatic  [] Abnormal -   [x] Mouth/Throat: Mucous membranes are moist    External Ears [x] Normal  [] Abnormal -    Neck: [x] No visualized mass [] Abnormal -     Pulmonary/Chest: [x] Respiratory effort normal   [x] No visualized signs of difficulty breathing or respiratory distress        [] Abnormal -      Musculoskeletal:   [x] Normal gait with no signs of ataxia         [x] Normal range of motion of neck        [] Abnormal -     Neurological:        [x] No Facial Asymmetry (Cranial nerve 7 motor function) (limited exam due to video visit)          [x] No gaze palsy        [] Abnormal -          Skin:        [x] No significant exanthematous lesions or discoloration noted on facial skin         [] Abnormal -            Psychiatric:       [x] Normal Affect [] Abnormal -        [x] No Hallucinations    Other pertinent observable physical exam findings:-            Delfina Winston, was evaluated through a synchronous (real-time) audio-video encounter. The patient (or guardian if applicable) is aware that this is a billable service. Verbal consent to proceed has been obtained within the past 12 months. The visit was conducted pursuant to the emergency declaration under the [facility], [address] [place] [address] authority and the [employer] Act. Patient identification was verified, and a caregiver was present when appropriate. The patient was located in a state where the provider was credentialed to provide care. An electronic signature was used to authenticate this note.     --Kash Castillo MD

## 2021-08-17 NOTE — ASSESSMENT & PLAN NOTE
See orders for patient and pt also given complete instructions re: course of therapy  Needs ptx- orders written

## 2021-08-17 NOTE — LETTER
[facility] [address]  [address], [address] [place] [id number]  Phone: [phone]  Fax: [phone]    Manasa Jacobs MD        August 17, 2021     Patient: Rosalia Moreno   YOB: 1955   Date of Visit: 8/17/2021       To Whom It May Concern: It is my medical opinion that Hiren Handley is unable to work starting yesterday through Friday 8/20 due to severe low back pain. She may return on Monday 8/23 but will need time off intermittently to do physical therapy to prevent recurrences. If you have any questions or concerns, please don't hesitate to call.     Sincerely,        Manasa Jacobs MD

## 2021-09-01 ENCOUNTER — PATIENT MESSAGE (OUTPATIENT)
Dept: ENDOCRINOLOGY | Age: 66
End: 2021-09-01

## 2021-09-01 DIAGNOSIS — E11.65 UNCONTROLLED TYPE 2 DIABETES MELLITUS WITH HYPERGLYCEMIA (HCC): Primary | ICD-10-CM

## 2021-09-03 RX ORDER — DULAGLUTIDE 1.5 MG/.5ML
1.5 INJECTION, SOLUTION SUBCUTANEOUS WEEKLY
Qty: 13 PEN | Refills: 2 | Status: SHIPPED | OUTPATIENT
Start: 2021-09-03 | End: 2021-12-15

## 2021-09-03 RX ORDER — DULAGLUTIDE 1.5 MG/.5ML
1.5 INJECTION, SOLUTION SUBCUTANEOUS WEEKLY
Qty: 13 PEN | Refills: 2 | Status: SHIPPED | OUTPATIENT
Start: 2021-09-03 | End: 2021-09-03 | Stop reason: SDUPTHER

## 2021-09-03 NOTE — TELEPHONE ENCOUNTER
From: Augustin Ramirez  Sent: 9/2/2021 12:54 PM EDT  To[facility]  Subject: RE: Prescription Question    The email just said they have been trying to reach your office to no avail. Not sure what they needed. Also, I included in the message what is my dosage for the Lantus and Humilin ru500. I know I take the Trulicity one a week. Not sure of the dosages on these others and the scheduled times, I apologize asking about it but I think I am not doing it right and need a reminder.     Thank you

## 2021-09-27 ENCOUNTER — VIRTUAL VISIT (OUTPATIENT)
Dept: INTERNAL MEDICINE CLINIC | Age: 66
End: 2021-09-27
Payer: COMMERCIAL

## 2021-09-27 DIAGNOSIS — E66.01 MORBID OBESITY WITH BMI OF 50.0-59.9, ADULT (HCC): ICD-10-CM

## 2021-09-27 DIAGNOSIS — Z79.4 TYPE 2 DIABETES MELLITUS WITH OTHER CIRCULATORY COMPLICATION, WITH LONG-TERM CURRENT USE OF INSULIN (HCC): ICD-10-CM

## 2021-09-27 DIAGNOSIS — I10 ESSENTIAL HYPERTENSION: ICD-10-CM

## 2021-09-27 DIAGNOSIS — M54.50 CHRONIC BILATERAL LOW BACK PAIN WITHOUT SCIATICA: ICD-10-CM

## 2021-09-27 DIAGNOSIS — N30.00 ACUTE CYSTITIS WITHOUT HEMATURIA: ICD-10-CM

## 2021-09-27 DIAGNOSIS — G89.29 CHRONIC BILATERAL LOW BACK PAIN WITHOUT SCIATICA: ICD-10-CM

## 2021-09-27 DIAGNOSIS — E11.59 TYPE 2 DIABETES MELLITUS WITH OTHER CIRCULATORY COMPLICATION, WITH LONG-TERM CURRENT USE OF INSULIN (HCC): ICD-10-CM

## 2021-09-27 PROCEDURE — 99214 OFFICE O/P EST MOD 30 MIN: CPT | Performed by: INTERNAL MEDICINE

## 2021-09-27 RX ORDER — CIPROFLOXACIN 500 MG/1
500 TABLET, FILM COATED ORAL 2 TIMES DAILY
Qty: 20 TABLET | Refills: 0 | Status: SHIPPED | OUTPATIENT
Start: 2021-09-27 | End: 2021-10-07

## 2021-09-27 ASSESSMENT — ENCOUNTER SYMPTOMS
NAUSEA: 1
BACK PAIN: 1
COLOR CHANGE: 0
CHEST TIGHTNESS: 0
ABDOMINAL PAIN: 0
WHEEZING: 0

## 2021-09-27 NOTE — PROGRESS NOTES
Leander Cruz (:  1955) is a 73 y.o. female,Established patient, here for evaluation of the following chief complaint(s): Urinary Tract Infection (urinary frequency, nausea, urgency)         ASSESSMENT/PLAN:  1. Acute cystitis without hematuria  Assessment & Plan:   See orders for patient and pt also given complete instructions re: course of therapy    2. Chronic bilateral low back pain without sciatica  Assessment & Plan:   Try to get the ptx started asap and work on wt loss  3. Essential hypertension  Assessment & Plan:   cont to follow and call if not coming down  4. Type 2 diabetes mellitus with other circulatory complication, with long-term current use of insulin (HCC)  Assessment & Plan:   Encouraged pt to cont regular f/u w endo   5. Morbid obesity with BMI of 50.0-59.9, adult [facility])  Assessment & Plan:   Discussed with patient at length health risks of obesity and need for diet and exercise      Return in about 3 months (around 2021) for follow up. SUBJECTIVE/OBJECTIVE:  Having severe frequency of urination- going on past 7 days-no fevers-no flank pain- has not had one for many many months- no progress w wt loss-still having low back pain-has not done ptx as of yet due to cost- sugars still not well controlled-bp's fairly well controlled last check       Review of Systems   Constitutional: Negative for activity change, appetite change, fatigue and fever. HENT: Negative for congestion. Eyes: Negative for visual disturbance. Respiratory: Negative for chest tightness and wheezing. Cardiovascular: Negative for chest pain and palpitations. Gastrointestinal: Positive for nausea. Negative for abdominal pain. Genitourinary: Positive for frequency. Negative for dysuria, enuresis, flank pain and hematuria. Musculoskeletal: Positive for arthralgias, back pain and gait problem. Skin: Negative for color change and rash.    Neurological: Negative for weakness, light-headedness and headaches. Patient-Reported Vitals 8/17/2021   Patient-Reported Weight 357lb   Patient-Reported Systolic 467   Patient-Reported Diastolic 76   Patient-Reported Temperature 98.0        Physical Exam    [INSTRUCTIONS:  \"[x]\" Indicates a positive item  \"[]\" Indicates a negative item  -- DELETE ALL ITEMS NOT EXAMINED]    Constitutional: [x] Appears well-developed and well-nourished [x] No apparent distress      [x] Abnormal - morbid obesity     Mental status: [x] Alert and awake  [x] Oriented to person/place/time [x] Able to follow commands    [] Abnormal -     Eyes:   EOM    [x]  Normal    [] Abnormal -   Sclera  [x]  Normal    [] Abnormal -          Discharge [x]  None visible   [] Abnormal -     HENT: [x] Normocephalic, atraumatic  [] Abnormal -   [x] Mouth/Throat: Mucous membranes are moist    External Ears [x] Normal  [] Abnormal -    Neck: [x] No visualized mass [] Abnormal -     Pulmonary/Chest: [x] Respiratory effort normal   [x] No visualized signs of difficulty breathing or respiratory distress        [] Abnormal -      Musculoskeletal:   [x] Normal gait with no signs of ataxia         [x] Normal range of motion of neck        [] Abnormal -     Neurological:        [x] No Facial Asymmetry (Cranial nerve 7 motor function) (limited exam due to video visit)          [x] No gaze palsy        [] Abnormal -          Skin:        [x] No significant exanthematous lesions or discoloration noted on facial skin         [] Abnormal -            Psychiatric:       [x] Normal Affect [] Abnormal -        [x] No Hallucinations    Other pertinent observable physical exam findings:-          Edward Hickey, was evaluated through a synchronous (real-time) audio-video encounter. The patient (or guardian if applicable) is aware that this is a billable service. Verbal consent to proceed has been obtained within the past 12 months.  The visit was conducted pursuant to the emergency declaration under the [address] and the National [employer] Act, [facility] waiver authority and the [employer] Act. Patient identification was verified, and a caregiver was present when appropriate. The patient was located in a state where the provider was credentialed to provide care. An electronic signature was used to authenticate this note.     --Tory Thacker MD

## 2021-09-28 DIAGNOSIS — E11.65 UNCONTROLLED TYPE 2 DIABETES MELLITUS WITH HYPERGLYCEMIA (HCC): Primary | ICD-10-CM

## 2021-09-28 RX ORDER — INSULIN LISPRO 100 [IU]/ML
INJECTION, SOLUTION INTRAVENOUS; SUBCUTANEOUS
Qty: 45 PEN | Refills: 1 | Status: SHIPPED | OUTPATIENT
Start: 2021-09-28 | End: 2021-11-30

## 2021-09-28 NOTE — TELEPHONE ENCOUNTER
Medication:   Requested Prescriptions     Pending Prescriptions Disp Refills    HUMALOG KWIKPEN 100 UNIT/ML SOPN 45 pen 1     Si-50 units AC TID         Last appt: 2021   Next appt: due around 10/09/2021    Last Labs DM:   Lab Results   Component Value Date    LABA1C 9.5 05/10/2021

## 2021-10-01 ENCOUNTER — TELEPHONE (OUTPATIENT)
Dept: ENDOCRINOLOGY | Age: 66
End: 2021-10-01

## 2021-10-01 NOTE — TELEPHONE ENCOUNTER
Patient says her blood sugars have been running between 431-496. She would like the nurse to call her back.

## 2021-10-01 NOTE — TELEPHONE ENCOUNTER
Spoke to patient. She reports glucose 392- Hi. Has a UTI. No fever, nausea, vomiting. Taking lantus 70/70, waiting on her humalog prescription. Also taking U-500 70 twice a day.  Advised it should  Advised if persistently high, to go to ER  Glucose 505 now, advised 105 units of U-500

## 2021-10-04 ENCOUNTER — TELEPHONE (OUTPATIENT)
Dept: ENDOCRINOLOGY | Age: 66
End: 2021-10-04

## 2021-10-15 ENCOUNTER — TELEPHONE (OUTPATIENT)
Dept: ENDOCRINOLOGY | Age: 66
End: 2021-10-15

## 2021-10-15 NOTE — TELEPHONE ENCOUNTER
Blood sugars are running high 300-400  Lantus 80 units daily   Humalog 50 units TID Plus SS    No recent infection, fever , chest pain     Not taking U-500    Change Lantus to 70 units bid   Keep Humalog 50 units tidac   Increase hydration, cut down soda, pop, juices, carbs     IF blood sugars remain high go to ER   She verbalized understanding

## 2021-10-20 ENCOUNTER — HOSPITAL ENCOUNTER (EMERGENCY)
Age: 66
Discharge: HOME | End: 2021-10-20
Attending: EMERGENCY MEDICINE
Payer: COMMERCIAL

## 2021-10-20 ENCOUNTER — APPOINTMENT (OUTPATIENT)
Dept: GENERAL RADIOLOGY | Age: 66
End: 2021-10-20
Payer: COMMERCIAL

## 2021-10-20 VITALS
HEART RATE: 84 BPM | DIASTOLIC BLOOD PRESSURE: 82 MMHG | TEMPERATURE: 98.5 F | SYSTOLIC BLOOD PRESSURE: 184 MMHG | RESPIRATION RATE: 16 BRPM | OXYGEN SATURATION: 98 %

## 2021-10-20 DIAGNOSIS — R73.9 HYPERGLYCEMIA: Primary | ICD-10-CM

## 2021-10-20 LAB
ANION GAP SERPL CALCULATED.3IONS-SCNC: 11 MMOL/L (ref 3–16)
BASE EXCESS VENOUS: 6 MMOL/L (ref -2–3)
BASOPHILS ABSOLUTE: 0 K/UL (ref 0–0.2)
BASOPHILS RELATIVE PERCENT: 0.3 %
BILIRUBIN URINE: NEGATIVE
BLOOD, URINE: NEGATIVE
BUN BLDV-MCNC: 9 MG/DL (ref 7–20)
CALCIUM SERPL-MCNC: 9.3 MG/DL (ref 8.3–10.6)
CARBOXYHEMOGLOBIN: 1.1 % (ref 0–1.5)
CHLORIDE BLD-SCNC: 95 MMOL/L (ref 99–110)
CLARITY: CLEAR
CO2: 27 MMOL/L (ref 21–32)
COLOR: YELLOW
CREAT SERPL-MCNC: 0.8 MG/DL (ref 0.6–1.2)
EOSINOPHILS ABSOLUTE: 0.1 K/UL (ref 0–0.6)
EOSINOPHILS RELATIVE PERCENT: 1.7 %
GFR AFRICAN AMERICAN: >60
GFR NON-AFRICAN AMERICAN: >60
GLUCOSE BLD-MCNC: 159 MG/DL (ref 70–99)
GLUCOSE BLD-MCNC: 185 MG/DL (ref 70–99)
GLUCOSE BLD-MCNC: 352 MG/DL (ref 70–99)
GLUCOSE BLD-MCNC: 367 MG/DL (ref 70–99)
GLUCOSE URINE: >=1000 MG/DL
HCO3 VENOUS: 33.1 MMOL/L (ref 24–28)
HCT VFR BLD CALC: 41.6 % (ref 36–48)
HEMOGLOBIN, VEN, REDUCED: 37.7 %
HEMOGLOBIN: 14 G/DL (ref 12–16)
KETONES, URINE: NEGATIVE MG/DL
LEUKOCYTE ESTERASE, URINE: NEGATIVE
LYMPHOCYTES ABSOLUTE: 2.5 K/UL (ref 1–5.1)
LYMPHOCYTES RELATIVE PERCENT: 30.1 %
MCH RBC QN AUTO: 30.7 PG (ref 26–34)
MCHC RBC AUTO-ENTMCNC: 33.6 G/DL (ref 31–36)
MCV RBC AUTO: 91.3 FL (ref 80–100)
METHEMOGLOBIN VENOUS: 0.4 % (ref 0–1.5)
MICROSCOPIC EXAMINATION: ABNORMAL
MONOCYTES ABSOLUTE: 0.7 K/UL (ref 0–1.3)
MONOCYTES RELATIVE PERCENT: 8.2 %
NEUTROPHILS ABSOLUTE: 4.9 K/UL (ref 1.7–7.7)
NEUTROPHILS RELATIVE PERCENT: 59.7 %
NITRITE, URINE: NEGATIVE
O2 SAT, VEN: 62 %
PCO2, VEN: 57.2 MMHG (ref 41–51)
PDW BLD-RTO: 13.8 % (ref 12.4–15.4)
PERFORMED ON: ABNORMAL
PH UA: 6 (ref 5–8)
PH VENOUS: 7.37 (ref 7.35–7.45)
PLATELET # BLD: 203 K/UL (ref 135–450)
PMV BLD AUTO: 12.1 FL (ref 5–10.5)
PO2, VEN: 33.1 MMHG (ref 25–40)
POTASSIUM REFLEX MAGNESIUM: 3.6 MMOL/L (ref 3.5–5.1)
PROTEIN UA: NEGATIVE MG/DL
RBC # BLD: 4.56 M/UL (ref 4–5.2)
SODIUM BLD-SCNC: 133 MMOL/L (ref 136–145)
SPECIFIC GRAVITY UA: 1.02 (ref 1–1.03)
TCO2 CALC VENOUS: 35 MMOL/L
TROPONIN: <0.01 NG/ML
URINE TYPE: ABNORMAL
UROBILINOGEN, URINE: 0.2 E.U./DL
WBC # BLD: 8.2 K/UL (ref 4–11)

## 2021-10-20 PROCEDURE — 85025 COMPLETE CBC W/AUTO DIFF WBC: CPT

## 2021-10-20 PROCEDURE — 2580000003 HC RX 258: Performed by: STUDENT IN AN ORGANIZED HEALTH CARE EDUCATION/TRAINING PROGRAM

## 2021-10-20 PROCEDURE — 82803 BLOOD GASES ANY COMBINATION: CPT

## 2021-10-20 PROCEDURE — 99283 EMERGENCY DEPT VISIT LOW MDM: CPT

## 2021-10-20 PROCEDURE — 36415 COLL VENOUS BLD VENIPUNCTURE: CPT

## 2021-10-20 PROCEDURE — 96374 THER/PROPH/DIAG INJ IV PUSH: CPT

## 2021-10-20 PROCEDURE — 81003 URINALYSIS AUTO W/O SCOPE: CPT

## 2021-10-20 PROCEDURE — 80048 BASIC METABOLIC PNL TOTAL CA: CPT

## 2021-10-20 PROCEDURE — 84484 ASSAY OF TROPONIN QUANT: CPT

## 2021-10-20 PROCEDURE — 6370000000 HC RX 637 (ALT 250 FOR IP): Performed by: STUDENT IN AN ORGANIZED HEALTH CARE EDUCATION/TRAINING PROGRAM

## 2021-10-20 RX ORDER — SODIUM CHLORIDE, SODIUM LACTATE, POTASSIUM CHLORIDE, AND CALCIUM CHLORIDE .6; .31; .03; .02 G/100ML; G/100ML; G/100ML; G/100ML
1000 INJECTION, SOLUTION INTRAVENOUS ONCE
Status: COMPLETED | OUTPATIENT
Start: 2021-10-20 | End: 2021-10-20

## 2021-10-20 RX ORDER — 0.9 % SODIUM CHLORIDE 0.9 %
1000 INTRAVENOUS SOLUTION INTRAVENOUS ONCE
Status: COMPLETED | OUTPATIENT
Start: 2021-10-20 | End: 2021-10-20

## 2021-10-20 RX ADMIN — INSULIN HUMAN 10 UNITS: 100 INJECTION, SOLUTION PARENTERAL at 20:59

## 2021-10-20 RX ADMIN — SODIUM CHLORIDE 1000 ML: 0.9 INJECTION, SOLUTION INTRAVENOUS at 22:10

## 2021-10-20 RX ADMIN — SODIUM CHLORIDE, SODIUM LACTATE, POTASSIUM CHLORIDE, AND CALCIUM CHLORIDE 1000 ML: .6; .31; .03; .02 INJECTION, SOLUTION INTRAVENOUS at 19:59

## 2021-10-20 ASSESSMENT — ENCOUNTER SYMPTOMS
COUGH: 1
VOMITING: 0
SHORTNESS OF BREATH: 0
NAUSEA: 1
CONSTIPATION: 0
DIARRHEA: 0
RHINORRHEA: 0
ABDOMINAL PAIN: 0

## 2021-10-20 NOTE — ED TRIAGE NOTES
PT presents to ED per PCP recommendation for hyperglycemia. Pt glucose was 499 at 1730, pt then took 100 units of Humalin.  Glucose now 367

## 2021-10-20 NOTE — ED PROVIDER NOTES
[address] [facility] RESIDENT NOTE       Date of evaluation: 10/20/2021    Chief Complaint     Hyperglycemia      History of Present Illness     Jennifer Arredondo is a 73 y.o. female with a history of HTN, HLD, asthma, DMII, IBS, and obesity who presents to the emergency department for evaluation of hyperglycemia. Patient states that she has had persistently high blood glucose over the last 1 week. She denies any acute change to her diet or any medication noncompliance. Patient states that she feels \"brain fog\" as well as generalized fatigue. Patient also reports nausea, no vomiting. She also reports recent congestion and cough which she believes is secondary to her baseline seasonal allergies. She feels otherwise well. No fevers or chills. No chest pain or shortness of breath. No abdominal pain or vomiting. No urinary symptoms. Of note, patient recently diagnosed with a urinary tract infection and treated with an unknown antibiotic. Patient states that her symptoms have since resolved. Per chart review, patient recently messaged her PCP about her hyperglycemia. Patient was increased on her Lantus from 80 units daily to 70 units BID. Patient also instructed to take her home humalog. Patient instructed to go to the ED if she had persistent hyperglycemia. Most recent HbA1C notably 10.8 on 2/26/21. Review of Systems     Review of Systems   Constitutional: Positive for fatigue. Negative for chills and fever. HENT: Negative for congestion and rhinorrhea. Respiratory: Positive for cough. Negative for shortness of breath. Cardiovascular: Negative for chest pain and leg swelling. Gastrointestinal: Positive for nausea. Negative for abdominal pain, constipation, diarrhea and vomiting. Genitourinary: Negative for dysuria and hematuria. Musculoskeletal: Negative for arthralgias. Skin: Negative for rash.    Neurological: Negative for syncope, light-headedness and headaches. Psychiatric/Behavioral: Negative for behavioral problems. All other systems reviewed and are negative. Past Medical, Surgical, Family, and Social History     She has a past medical history of Anxiety and depression, Asthma, Edema, GERD (gastroesophageal reflux disease), Hyperlipidemia, Hypertension, IBS (irritable bowel syndrome), Irregular menstrual bleeding, Morbid obesity ([address] Multinodular goiter, Personal history of colonic polyps, Positive PPD, treated, S/P cholecystectomy, and Type II or unspecified type diabetes mellitus without mention of complication, not stated as uncontrolled. She has a past surgical history that includes Cholecystectomy; myomectomy; Tibia fracture surgery; Colonoscopy;  section (N/A, 30 plus years ago); hysteroscopy (2015); and Colonoscopy. Her family history is not on file. She reports that she quit smoking about 26 years ago. Her smoking use included cigarettes. She has a 5.00 pack-year smoking history. She has never used smokeless tobacco. She reports current alcohol use. She reports that she does not use drugs.     Medications     Discharge Medication List as of 10/20/2021 11:51 PM      CONTINUE these medications which have NOT CHANGED    Details   HUMALOG KWIKPEN 100 UNIT/ML SOPN 40-50 units AC TID, Disp-45 pen, R-1, DAWNormal      Dulaglutide (TRULICITY) 1.5 HADLEY/0.6SX SOPN Inject 1.5 mg into the skin once a week, Disp-13 pen, R-2Normal      chlorthalidone (HYGROTON) 25 MG tablet TAKE 1 TABLET DAILY, Disp-30 tablet, R-11Normal      ASPIRIN 81 PO Take by mouthHistorical Med      cloNIDine (CATAPRES) 0.1 MG tablet Take 2 tablets by mouth 2 times daily, Disp-360 tablet, R-3Normal      furosemide (LASIX) 20 MG tablet 2 daily until edema is improved then 1 daily, Disp-60 tablet, R-3Normal      busPIRone (BUSPAR) 15 MG tablet TAKE 1 TABLET THREE TIMES A DAY AS NEEDED FOR ANXIETY, Disp-90 tablet, R-11Normal      insulin glargine (LANTUS SOLOSTAR) 100 UNIT/ML injection pen 60 units twice a day, Disp-45 pen, R-1Normal      FLUoxetine (PROZAC) 40 MG capsule 1 daily, Disp-90 capsule, R-3Normal      gabapentin (NEURONTIN) 100 MG capsule Take up to 4 caps at bedtime and 1 tid during waking hours, Disp-630 capsule, R-3Normal      atorvastatin (LIPITOR) 40 MG tablet TAKE 1 TABLET DAILY, Disp-90 tablet, R-0Normal      losartan (COZAAR) 100 MG tablet Take 1 tablet by mouth daily, Disp-90 tablet,R-3Normal      omeprazole (PRILOSEC) 40 MG delayed release capsule Take 1 capsule by mouth every morning (before breakfast), Disp-30 capsule,R-1Normal      verapamil (CALAN SR) 240 MG extended release tablet TAKE 1 TABLET NIGHTLY, Disp-90 tablet,R-3Normal      blood glucose test strips (TRUE METRIX BLOOD GLUCOSE TEST) strip 1 each by In Vitro route 3 times daily As needed. , Disp-300 each, R-3Normal      diclofenac sodium 1 % GEL Apply 4 g topically 4 times daily, Topical, 4 TIMES DAILY Starting Mon 1/27/2020, Disp-3 Tube, R-5, Normal      betamethasone dipropionate (DIPROLENE) 0.05 % ointment APPLY TOPICALLY DAILY, Disp-90 g, R-3, Normal             Allergies     She is allergic to actos [pioglitazone hydrochloride], metformin, and sulfa antibiotics. Physical Exam     INITIAL VITALS: BP: (!) 209/77, Temp: 98.5 °F (36.9 °C), Pulse: 67, Resp: 15, SpO2: 98 %   Physical Exam  Vitals and nursing note reviewed. Constitutional:       General: She is not in acute distress. Appearance: Normal appearance. She is not toxic-appearing. HENT:      Head: Normocephalic and atraumatic. Nose: Nose normal.      Mouth/Throat:      Mouth: Mucous membranes are moist.   Eyes:      Extraocular Movements: Extraocular movements intact. Pupils: Pupils are equal, round, and reactive to light. Cardiovascular:      Rate and Rhythm: Normal rate and regular rhythm. Pulmonary:      Effort: Pulmonary effort is normal. No respiratory distress. Breath sounds:  No wheezing or rales. Abdominal:      General: Abdomen is flat. There is no distension. Palpations: Abdomen is soft. Tenderness: There is no abdominal tenderness. There is no guarding or rebound. Musculoskeletal:         General: Normal range of motion. Cervical back: Normal range of motion and neck supple. Skin:     General: Skin is warm and dry. Capillary Refill: Capillary refill takes less than 2 seconds. Findings: No rash. Neurological:      General: No focal deficit present. Mental Status: She is alert and oriented to person, place, and time.    Psychiatric:         Mood and Affect: Mood normal.         Behavior: Behavior normal.         DiagnosticResults     EKG   Interpreted in conjunction with emergencydepartment physician No att. providers found  Rhythm: normal sinus   Rate: normal  Axis: normal  Ectopy: none  Conduction: normal  ST Segments: no acute change  T Waves:no acute change  Q Waves: none  Clinical Impression: no acute changes     RADIOLOGY:  No orders to display       LABS:   Results for orders placed or performed during the hospital encounter of 10/20/21   CBC Auto Differential   Result Value Ref Range    WBC 8.2 4.0 - 11.0 K/uL    RBC 4.56 4.00 - 5.20 M/uL    Hemoglobin 14.0 12.0 - 16.0 g/dL    Hematocrit 41.6 36.0 - 48.0 %    MCV 91.3 80.0 - 100.0 fL    MCH 30.7 26.0 - 34.0 pg    MCHC 33.6 31.0 - 36.0 g/dL    RDW 13.8 12.4 - 15.4 %    Platelets 752 330 - 554 K/uL    MPV 12.1 (H) 5.0 - 10.5 fL    Neutrophils % 59.7 %    Lymphocytes % 30.1 %    Monocytes % 8.2 %    Eosinophils % 1.7 %    Basophils % 0.3 %    Neutrophils Absolute 4.9 1.7 - 7.7 K/uL    Lymphocytes Absolute 2.5 1.0 - 5.1 K/uL    Monocytes Absolute 0.7 0.0 - 1.3 K/uL    Eosinophils Absolute 0.1 0.0 - 0.6 K/uL    Basophils Absolute 0.0 0.0 - 0.2 K/uL   Basic Metabolic Panel w/ Reflex to MG   Result Value Ref Range    Sodium 133 (L) 136 - 145 mmol/L    Potassium reflex Magnesium 3.6 3.5 - 5.1 mmol/L Chloride 95 (L) 99 - 110 mmol/L    CO2 27 21 - 32 mmol/L    Anion Gap 11 3 - 16    Glucose 352 (H) 70 - 99 mg/dL    BUN 9 7 - 20 mg/dL    CREATININE 0.8 0.6 - 1.2 mg/dL    GFR Non-African American >60 >60    GFR African American >60 >60    Calcium 9.3 8.3 - 10.6 mg/dL   Urinalysis, reflex to microscopic   Result Value Ref Range    Color, UA Yellow Straw/Yellow    Clarity, UA Clear Clear    Glucose, Ur >=1000 (A) Negative mg/dL    Bilirubin Urine Negative Negative    Ketones, Urine Negative Negative mg/dL    Specific Gravity, UA 1.020 1.005 - 1.030    Blood, Urine Negative Negative    pH, UA 6.0 5.0 - 8.0    Protein, UA Negative Negative mg/dL    Urobilinogen, Urine 0.2 <2.0 E.U./dL    Nitrite, Urine Negative Negative    Leukocyte Esterase, Urine Negative Negative    Microscopic Examination Not Indicated     Urine Type Voided    Troponin   Result Value Ref Range    Troponin <0.01 <0.01 ng/mL   Blood Gas, Venous   Result Value Ref Range    pH, Joe 7.371 7.350 - 7.450    pCO2, Joe 57.2 (H) 41.0 - 51.0 mmHg    pO2, Joe 33.1 25 - 40 mmHg    HCO3, Venous 33.1 (H) 24.0 - 28.0 mmol/L    Base Excess, Joe 6.0 (H) -2.0 - 3.0 mmol/L    O2 Sat, Joe 62 Not established %    Carboxyhemoglobin 1.1 0.0 - 1.5 %    MetHgb, Joe 0.4 0.0 - 1.5 %    TC02 (Calc), Joe 35 mmol/L    Hemoglobin, Joe, Reduced 37.70 %   POCT Glucose   Result Value Ref Range    POC Glucose 367 (H) 70 - 99 mg/dl    Performed on ACCU-CHEK    POCT Glucose   Result Value Ref Range    POC Glucose 159 (H) 70 - 99 mg/dl    Performed on ACCU-CHEK    POCT Glucose   Result Value Ref Range    POC Glucose 185 (H) 70 - 99 mg/dl    Performed on ACCU-CHEK        ED BEDSIDE ULTRASOUND:  None. RECENT VITALS:  BP: (!) 184/82, Temp: 98.5 °F (36.9 °C), Pulse: 84,Resp: 16, SpO2: 98 %     Procedures     None. ED Course     Nursing Notes, Past Medical Hx, Past Surgical Hx, Social Hx, Allergies, and Family Hx were reviewed.     The patient was given the followingmedications:  Orders Placed This Encounter   Medications    lactated ringers bolus    DISCONTD: insulin regular (HUMULIN R;NOVOLIN R) injection 10 Units    0.9 % sodium chloride bolus    insulin regular (HUMULIN R;NOVOLIN R) injection 10 Units       CONSULTS:  None    MEDICAL DECISION MAKING / ASSESSMENT / Steffany Alas is a 73 y.o. female with a history of type 2 diabetes who presents to the emergency department for persistent hyperglycemia. Patient reports associated confusion, fatigue, and urinary frequency. She denies any infectious symptoms. Vital signs on presentation are notable for hypertension but are otherwise unremarkable. Physical exam is reassuring without any acute abnormalities. History and physical exam are most consistent with hyperglycemia, likely in the setting of medication resistance given lack of infectious symptoms. Also considered other etiologies of hyperglycemia including infectious etiologies including persistent urinary tract infection or pneumonia, electrolyte abnormalities, and cardiac etiologies including ACS. Extensive work-up was obtained. CBC is within normal limits. BMP without electrolyte or creatinine abnormalities. LFTs and lipase obtained and also negative. I also consider chest x-ray for evaluation of pneumonia, this was ordered however patient adamantly declined this as it was \"definitely due to her allergies and not pneumonia. \"    Patient remained in stable condition throughout her time in the emergency department. She was resuscitated with 2 liters of IV fluid. Patient also given 10 units of IV regular insulin. Blood glucose at time of discharge was 185. Work-up is reassuring without other concerning etiologies of her hyperglycemia. Plan was made to discharge patient home with close outpatient follow-up with primary care provider. I discussed the importance of following up. Patient expressed understanding is comfortable with this plan. Strict return instructions were discussed. Patient ultimately discharged home in stable condition. This patient was also evaluated by the attending physician. All care plans werediscussed and agreed upon. Clinical Impression     1.  Hyperglycemia        Disposition     PATIENT REFERRED TO:  Chaparrita Gurrola MD  [address] [facility]  [phone]    Schedule an appointment as soon as possible for a visit in 1 week        DISCHARGE MEDICATIONS:  Discharge Medication List as of 10/20/2021 11:51 PM          DISPOSITION       Donnie Park MD  10/21/21 [id number]

## 2021-10-21 NOTE — ED NOTES
Pt was explained discharge instructions and questions where answered.       Iam Vee, RN  10/20/21 [id number]

## 2021-10-25 ENCOUNTER — TELEPHONE (OUTPATIENT)
Dept: ENDOCRINOLOGY | Age: 66
End: 2021-10-25

## 2021-10-25 NOTE — TELEPHONE ENCOUNTER
Pt was seen in the er last night\\ er told her to be seen asap due to low sugars\\ pt has apt in [place] wants to know if she can be seen sooner.

## 2021-10-28 ENCOUNTER — VIRTUAL VISIT (OUTPATIENT)
Dept: ENDOCRINOLOGY | Age: 66
End: 2021-10-28
Payer: COMMERCIAL

## 2021-10-28 DIAGNOSIS — E11.65 UNCONTROLLED TYPE 2 DIABETES MELLITUS WITH HYPERGLYCEMIA (HCC): Primary | ICD-10-CM

## 2021-10-28 PROCEDURE — 99213 OFFICE O/P EST LOW 20 MIN: CPT | Performed by: NURSE PRACTITIONER

## 2021-10-28 RX ORDER — FLASH GLUCOSE SENSOR
1 KIT MISCELLANEOUS
Qty: 2 EACH | Refills: 5 | Status: SHIPPED | OUTPATIENT
Start: 2021-10-28

## 2021-10-28 RX ORDER — FLASH GLUCOSE SCANNING READER
1 EACH MISCELLANEOUS DAILY
Qty: 1 EACH | Refills: 0 | Status: SHIPPED | OUTPATIENT
Start: 2021-10-28

## 2021-10-28 NOTE — PROGRESS NOTES
Years since quittin.4   Smokeless Tobacco Never Used      Social History     Substance and Sexual Activity   Alcohol Use Yes    Comment: once a month       PMH includes  Past Medical History:   Diagnosis Date    Anxiety and depression     Asthma     Edema     GERD (gastroesophageal reflux disease)     Hyperlipidemia     Hypertension     IBS (irritable bowel syndrome)     Irregular menstrual bleeding 2011    Dr. Shelli Fregoso obesity ([facility] [address]     Multinodular goiter 2010    Personal history of colonic polyps     Dr. Fidencio Villalobos- due for repeat colonoscopy     Positive PPD, treated     INH completed 2001    S/P cholecystectomy 2011    Type II or unspecified type diabetes mellitus without mention of complication, not stated as uncontrolled      No family history on file. Diabetes:  Fadia Tucker  was diagnosed with diabetes type 2  approx 8 years ago   On insulin: > 3 years   Patient reports that disease course has been variable and is currently poorly controlled   Current microvascular complications include retinopathy NPDR    Current Macrovascular complications there is no history of  CVD, CAD, PVD   Patient reports compliance for about 80 % of the time and adheres to medication, but usually not to diet and exercise instructions.  This visit is a follow up for a recent  ED admissions for hyperglycemia   Patient wants to review labs done in the ER as well    Blood glucose trends:  Readings per day : prior to each insulin injection; checks more than 4 times a day  BG Range : 50 -480s  Fasting : > 250  Prandial : > 200-480  Hypoglycemia awareness and symptoms: has been in the 70-80s and feels like she is about to pass out.  Usually due to over correction and not eating enough subsequently  Has not done CGM    Current Medication regimen:   Trulicity 1.5 mg QW  Lantus: 60 units BID, usually titrates to 60-80 units BID per self  Humalog 50/50/50 + SSI 3: 50 > 150   Does not follow consistent dosing regimen     Previous Meds  U-500 70/80/70    But taking only with lunch n dinner  SSI 5 for 50>150    GFR >60      Diabetic Health Maintenance   Nutrition Education: none recently   Eats 3 meals a day with ongoing snacks: fairly high and variable carb content  Average carbs per day: does not count; does not know recommendations  Beverages include: water, Nestea, occasionally caramel coffee at [employer] ( has stopped this last week)  Current Exercise: No structured exercise, sedentary job  Last Eye Exam: follows regularly @[facility] with Dr Bianca Carlisle exam: at PCP Visit/podiatrist      Hyperlipidemia: Current complaints include occasional myalgias but otherwise tolerates well. Lab Results   Component Value Date    CHOL 124 03/14/2020    CHOL 161 01/15/2019    CHOL 129 09/21/2017     Lab Results   Component Value Date    TRIG 54 03/14/2020    TRIG 75 01/15/2019    TRIG 60 09/21/2017     Lab Results   Component Value Date    HDL 55 03/14/2020    HDL 63 01/15/2019    HDL 66 09/21/2017    HDL 61 05/22/2010     Lab Results   Component Value Date    LDLCALC 58 03/14/2020    LDLCALC 83 01/15/2019    LDLCALC 51 09/21/2017     No results found for: LDLDIRECT  No results found for: CHOLHDLRATIO      Hypertension  Controlled , denies symptoms of dizziness, light headedness. Occasional dependent edema. Tries to follow a salt restricted diet.    Lab Results   Component Value Date     (L) 10/20/2021    K 3.6 10/20/2021    CL 95 (L) 10/20/2021    CO2 27 10/20/2021    BUN 9 10/20/2021    CREATININE 0.8 10/20/2021    GLUCOSE 352 (H) 10/20/2021    CALCIUM 9.3 10/20/2021    PROT 7.3 02/26/2021    LABALBU 3.9 02/26/2021    BILITOT 0.4 02/26/2021    ALKPHOS 141 (H) 02/26/2021    AST 10 (L) 02/26/2021    ALT 12 02/26/2021    LABGLOM >60 10/20/2021    GFRAA >60 10/20/2021    AGRATIO 1.1 02/26/2021    GLOB 3.4 02/26/2021     The ASCVD Risk score (Roger Brumfield, et al., 2013) failed to calculate for the following reasons: The valid total cholesterol range is 130 to 320 mg/dL      Review of Systems   Constitutional: Positive for fatigue. Negative for activity change, appetite change, diaphoresis, fever and unexpected weight change. HENT: Negative for dental problem. Eyes: Negative for pain and visual disturbance. Respiratory: Negative for shortness of breath. Cardiovascular: Negative for chest pain, palpitations and leg swelling. Gastrointestinal: Negative for abdominal distention, constipation, diarrhea and nausea. Endocrine: Negative for cold intolerance, heat intolerance, polydipsia, polyphagia and polyuria. Genitourinary: Negative. Negative for frequency and urgency. Musculoskeletal: Negative for arthralgias, back pain, joint swelling and myalgias. Skin: Negative. Negative for color change and pallor. Allergic/Immunologic: Negative. Neurological: Negative for dizziness, weakness, numbness and headaches. Psychiatric/Behavioral: Negative for dysphoric mood and sleep disturbance. The patient is not nervous/anxious. There were no vitals filed for this visit. televisit    Physical Exam: televisit    Skeletal foot exam: deferred.     Maury Seth is a 73 y.o. female with uncontrolled Diabetes Mellitus type 2 complicated by retinopathy(NPDR) and associated with hypertension, hyperlipidemia, morbid obesity, tobacco dependence, ADELINA,     Plan  Diabetes Mellitus Type 2  Lab Results   Component Value Date    LABA1C 9.5 05/10/2021     Lab Results   Component Value Date    HGB 14.0 10/20/2021       Goal A1c  < 6.5%  Medication: Trulicity 1.5 QW  Insulin:   Lantus : discussed titration to FBG 90 -140, specially if reducing carb intake  Humalo units AC TID with 3: 50 > 150  Avoid hypoglycemia  rx sent to [address] [place] [address] for [employer] gut[employer] sensors    Diet :   Referral to QPD, RD : carb counting, low carb diet principles, food diary and BG/insulin log  Will start food diary and start a BG log and insulin dosing  Will refer to Setred and log macronutrient intake in prep for visit with RD  30-40 grams per meal , 3 meals per day, avoid carbs in snack choices  Include moderate proteins and good fats   Ensure adequate hydration and  electrolyte replacement    Exercise :  Recommended exercise is 5-7 days a week for 30-60 mins at least, per day OR a total of 2.5 hours per week , which ever is more feasible. Ambulate as possible     Diabetic Health Maintenance  Follow up with eye exams  Follow up with annual podiatry exams if needed  Reviewed sick day management of BG : rule of 15, and Q4 dosing with humalog per current SSI  Will go to ER as needed for > 500 BG    Other areas of Diabetic Education reviewed:   Carbs: good carbs and bad carbs, importance of carb counting, incorporation of protein with each meal to reduce Glycemic index, importance of portions, Carb/insulin ratio   Fats: Good fats and bad fats, meal planning and supplements.  Discussed how food affects blood sugar readings.     Different diabetic medications   Managing high and low sugar readings   Rotation of sites for subcutaneous medication injection    Mixed Hyperlipidemia  Lab Results   Component Value Date    LDLCALC 58 03/14/2020    LDLCALC 83 01/15/2019    LDLCALC 51 09/21/2017     No results found for: LDLDIRECT  Lab Results   Component Value Date    TRIG 54 03/14/2020    TRIG 75 01/15/2019    TRIG 60 09/21/2017     LDL goal  < 100;   TG goal < 150; needs updated panel  Continue current regimen  Managed by PCP    Essential Hypertension  Blood pressure controlled per patient Continue current regimen    Vitamin D deficiency  Lab Results   Component Value Date    VITD25 25.0 (L) 03/14/2020     Continue to supplement  Recheck levels    Problem List Items Addressed This Visit     None      Visit Diagnoses     Uncontrolled type 2 diabetes mellitus with hyperglycemia ([address]    -  Primary    Relevant Orders    [facility] Individual [facility] Education (Non Care Coord Patient), [facility]    Hemoglobin A1C    LIPID PANEL          Return in about 3 months (around 1/28/2022).  with Dr Parvin Cerrato

## 2021-11-04 ENCOUNTER — IMMUNIZATION (OUTPATIENT)
Dept: INTERNAL MEDICINE CLINIC | Age: 66
End: 2021-11-04
Payer: COMMERCIAL

## 2021-11-04 DIAGNOSIS — Z23 NEED FOR INFLUENZA VACCINATION: Primary | ICD-10-CM

## 2021-11-04 PROCEDURE — 90694 VACC AIIV4 NO PRSRV 0.5ML IM: CPT | Performed by: INTERNAL MEDICINE

## 2021-11-04 PROCEDURE — 90471 IMMUNIZATION ADMIN: CPT | Performed by: INTERNAL MEDICINE

## 2021-11-08 ENCOUNTER — TELEPHONE (OUTPATIENT)
Dept: INTERNAL MEDICINE CLINIC | Age: 66
End: 2021-11-08

## 2021-11-08 DIAGNOSIS — E04.2 MULTIPLE THYROID NODULES: ICD-10-CM

## 2021-11-08 RX ORDER — CLONIDINE HYDROCHLORIDE 0.1 MG/1
0.2 TABLET ORAL 2 TIMES DAILY
Qty: 360 TABLET | Refills: 3 | Status: SHIPPED | OUTPATIENT
Start: 2021-11-08 | End: 2022-10-04 | Stop reason: SDUPTHER

## 2021-11-15 ENCOUNTER — TELEPHONE (OUTPATIENT)
Dept: INTERNAL MEDICINE CLINIC | Age: 66
End: 2021-11-15

## 2021-11-15 NOTE — TELEPHONE ENCOUNTER
[phone] (home)   SPOKE WITH PATIENT ADVISED HER THAT FORM WAS FAXED ON 10/26/21        REFAXED TODAY -[id number] TO PATIENT

## 2021-11-15 NOTE — TELEPHONE ENCOUNTER
Patient called in requesting her FMLA paperwork be emailed to her because the [employer] cant read it. [email or web address]      Pls advise.

## 2021-11-15 NOTE — TELEPHONE ENCOUNTER
Patient called in wanting an update on her FMLA paperwork. Wants to know if Dr. Elyse Bowie received it and filled it out. Pls call and advise.

## 2021-11-24 ENCOUNTER — PATIENT MESSAGE (OUTPATIENT)
Dept: ENDOCRINOLOGY | Age: 66
End: 2021-11-24

## 2021-11-29 RX ORDER — CALCIUM CITRATE/VITAMIN D3 200MG-6.25
1 TABLET ORAL 3 TIMES DAILY
Qty: 300 EACH | Refills: 3 | Status: SHIPPED | OUTPATIENT
Start: 2021-11-29

## 2021-11-29 RX ORDER — BLOOD SUGAR DIAGNOSTIC
STRIP MISCELLANEOUS
Qty: 100 STRIP | Refills: 6 | Status: SHIPPED | OUTPATIENT
Start: 2021-11-29

## 2021-11-30 ENCOUNTER — OFFICE VISIT (OUTPATIENT)
Dept: INTERNAL MEDICINE CLINIC | Age: 66
End: 2021-11-30
Payer: COMMERCIAL

## 2021-11-30 ENCOUNTER — TELEPHONE (OUTPATIENT)
Dept: ENDOCRINOLOGY | Age: 66
End: 2021-11-30

## 2021-11-30 VITALS
HEIGHT: 67 IN | HEART RATE: 97 BPM | BODY MASS INDEX: 45.99 KG/M2 | DIASTOLIC BLOOD PRESSURE: 79 MMHG | WEIGHT: 293 LBS | TEMPERATURE: 97.3 F | SYSTOLIC BLOOD PRESSURE: 135 MMHG

## 2021-11-30 DIAGNOSIS — N30.00 ACUTE CYSTITIS WITHOUT HEMATURIA: ICD-10-CM

## 2021-11-30 DIAGNOSIS — E11.59 TYPE 2 DIABETES MELLITUS WITH OTHER CIRCULATORY COMPLICATION, WITH LONG-TERM CURRENT USE OF INSULIN (HCC): Primary | ICD-10-CM

## 2021-11-30 DIAGNOSIS — E11.59 TYPE 2 DIABETES MELLITUS WITH OTHER CIRCULATORY COMPLICATION, WITH LONG-TERM CURRENT USE OF INSULIN (HCC): ICD-10-CM

## 2021-11-30 DIAGNOSIS — R35.0 URINARY FREQUENCY: ICD-10-CM

## 2021-11-30 DIAGNOSIS — E66.01 MORBID OBESITY WITH BMI OF 50.0-59.9, ADULT (HCC): ICD-10-CM

## 2021-11-30 DIAGNOSIS — Z79.4 TYPE 2 DIABETES MELLITUS WITH OTHER CIRCULATORY COMPLICATION, WITH LONG-TERM CURRENT USE OF INSULIN (HCC): Primary | ICD-10-CM

## 2021-11-30 DIAGNOSIS — E11.65 UNCONTROLLED TYPE 2 DIABETES MELLITUS WITH HYPERGLYCEMIA (HCC): ICD-10-CM

## 2021-11-30 DIAGNOSIS — I10 PRIMARY HYPERTENSION: ICD-10-CM

## 2021-11-30 DIAGNOSIS — Z79.4 TYPE 2 DIABETES MELLITUS WITH OTHER CIRCULATORY COMPLICATION, WITH LONG-TERM CURRENT USE OF INSULIN (HCC): ICD-10-CM

## 2021-11-30 PROCEDURE — 99214 OFFICE O/P EST MOD 30 MIN: CPT | Performed by: INTERNAL MEDICINE

## 2021-11-30 RX ORDER — INSULIN HUMAN 500 [IU]/ML
INJECTION, SOLUTION SUBCUTANEOUS
Qty: 6 EACH | Refills: 3 | Status: SHIPPED | OUTPATIENT
Start: 2021-11-30 | End: 2022-01-26 | Stop reason: SDUPTHER

## 2021-11-30 ASSESSMENT — ENCOUNTER SYMPTOMS
COLOR CHANGE: 0
BACK PAIN: 0
ABDOMINAL PAIN: 0
WHEEZING: 0
CHEST TIGHTNESS: 0

## 2021-11-30 NOTE — TELEPHONE ENCOUNTER
Called patient as was having hyperglycemia. She was not able to reach us due to phone system not working. Advised to contact us through Answer.To    Glucose 300 to 500  Taking lantus  units BID and humalog 80 units with meal and has taken upto 120 unit. She is changing the dose. Advised it would be better to switch to U-500 insulin given the high insulin dose and severe hyperglycemia. She would need to follow regular meal pattern.     U-500 100 units 30 min before meals    SSI 5 for 50 > 150

## 2021-11-30 NOTE — PATIENT INSTRUCTIONS
Dr. Hetal Cline  and Dr. Kanika Hendricks [address] [place] [address]. Dr. Ramses Mitchell [phone] [id number] [facility]. [facility]. Macey Green and Dr. Lindsey Perea [phone] opt#1    Dr. Rajwinder Lama [address]  [address] [facility] [address] [facility] [phone] opt#2    [address] [place] [address]  [phone]

## 2021-11-30 NOTE — ASSESSMENT & PLAN NOTE
Discussed with patient at length health risks of obesity and need for diet and exercise  Is really cutting carbs but wt loss now is unhealthy- hopefully pt will be able to maintain the wt loss

## 2021-11-30 NOTE — PROGRESS NOTES
Subjective:      Patient ID: Amadou Abdul is a 73 y.o. female. Chief Complaint   Patient presents with    Diabetes     follow  up     bs's are very very high and cannot get into Dr. Odalis Back- always in the mid to high 300 range-taking up to 80 u bid lantus, up to 100 u before each meal but still running very high-is urinating constantly and has lost 30 #'s- very thirsty all of the time- bp's well controlled and is trying to not eat carbs due to the high sugars- continues w severe joint pain primarily back and knees     Review of Systems   Constitutional: Positive for fatigue and unexpected weight change. Negative for activity change and appetite change. HENT: Negative for congestion. Eyes: Negative for visual disturbance. Respiratory: Negative for chest tightness and wheezing. Cardiovascular: Negative for chest pain and palpitations. Gastrointestinal: Negative for abdominal pain. Endocrine: Positive for polydipsia and polyphagia. Musculoskeletal: Negative for arthralgias and back pain. Skin: Negative for color change and rash. Neurological: Negative for weakness, light-headedness and headaches. No family history on file.   Social History     Socioeconomic History    Marital status:      Spouse name: Not on file    Number of children: Not on file    Years of education: Not on file    Highest education level: Not on file   Occupational History    Not on file   Tobacco Use    Smoking status: Former Smoker     Packs/day: 0.50     Years: 10.00     Pack years: 5.00     Types: Cigarettes     Quit date: 1995     Years since quittin.5    Smokeless tobacco: Never Used   Vaping Use    Vaping Use: Never used   Substance and Sexual Activity    Alcohol use: Yes     Comment: once a month    Drug use: No    Sexual activity: Not on file   Other Topics Concern    Not on file   Social History Narrative    Not on file     Social Determinants of Health     Financial Resource Strain: Low Risk     Difficulty of Paying Living Expenses: Not hard at all   Food Insecurity: No Food Insecurity    Worried About Running Out of Food in the Last Year: Never true    Ai of Food in the Last Year: Never true   Transportation Needs:     Lack of Transportation (Medical): Not on file    Lack of Transportation (Non-Medical): Not on file   Physical Activity:     Days of Exercise per Week: Not on file    Minutes of Exercise per Session: Not on file   Stress:     Feeling of Stress : Not on file   Social Connections:     Frequency of Communication with Friends and Family: Not on file    Frequency of Social Gatherings with Friends and Family: Not on file    Attends Faith Services: Not on file    Active Member of [address] [place] [address] [employer]in [employer] or Organizations: Not on file    Attends Club or Organization Meetings: Not on file    Marital Status: Not on file   Intimate Partner Violence:     Fear of Current or Ex-Partner: Not on file    Emotionally Abused: Not on file    Physically Abused: Not on file    Sexually Abused: Not on file   Housing Stability:     Unable to Pay for Housing in the Last Year: Not on file    Number of [place] in the Last Year: Not on file    Unstable Housing in the Last Year: Not on file         Objective:   Physical Exam  Constitutional:       Appearance: She is well-developed. She is obese. HENT:      Head: Normocephalic and atraumatic. Eyes:      Conjunctiva/sclera: Conjunctivae normal.      Pupils: Pupils are equal, round, and reactive to light. Cardiovascular:      Rate and Rhythm: Normal rate and regular rhythm. Heart sounds: Normal heart sounds. Pulmonary:      Effort: Pulmonary effort is normal. No respiratory distress. Breath sounds: Normal breath sounds. Abdominal:      General: There is no distension. Tenderness: There is no abdominal tenderness. Musculoskeletal:      Cervical back: Normal range of motion and neck supple.    Lymphadenopathy: Cervical: No cervical adenopathy. Skin:     General: Skin is warm and dry. Neurological:      Mental Status: She is alert and oriented to person, place, and time. Psychiatric:         Mood and Affect: Mood normal.         Behavior: Behavior normal.         Thought Content: Thought content normal.         Judgment: Judgment normal.           Assessment and Plan:      Di[facility] ([facility]   Dr. Georgia Cheatham contacted and pt now in contact w his office to adjust insulin and f/u w him     Morbid obesity with BMI of 50.0-59.9, adult [facility])   Discussed with patient at length health risks of obesity and need for diet and exercise  Is really cutting carbs but wt loss now is unhealthy- hopefully pt will be able to maintain the wt loss     Hypertension   Controlled w current regimen- continue and monitor closely  Better w wt loss     Urinary frequency   Now exacerbated by severe hyperglycemia- should improve w bs control     Acute cystitis without hematuria   H/o recurrent uti-will recheck now in spite of absence of symptoms       Encounter Diagnoses   Name Primary?  Type 2 diabetes mellitus with other circulatory complication, with long-term current use of insulin ([facility] Yes    Primary hypertension     Acute cystitis without hematuria     Morbid obesity with BMI of 50.0-59.9, adult ([facility]     Urinary frequency        Orders Placed This Encounter   Procedures    Culture, Urine     Order Specific Question:   Specify (ex-cath, midstream, cysto, etc)? Answer:   midstream    CBC Auto Differential     Standing Status:   Future     Number of Occurrences:   1     Standing Expiration Date:   11/30/2022    Comprehensive Metabolic Panel     Standing Status:   Future     Number of Occurrences:   1     Standing Expiration Date:   11/30/2022    Urinalysis with Microscopic     Order Specific Question:   SPECIFY(EX-CATH,MIDSTREAM,CYSTO,ETC)?      Answer:   midstream

## 2021-12-01 LAB
A/G RATIO: 1.2 (ref 1.1–2.2)
ALBUMIN SERPL-MCNC: 4.5 G/DL (ref 3.4–5)
ALP BLD-CCNC: 158 U/L (ref 40–129)
ALT SERPL-CCNC: 15 U/L (ref 10–40)
ANION GAP SERPL CALCULATED.3IONS-SCNC: 17 MMOL/L (ref 3–16)
AST SERPL-CCNC: 16 U/L (ref 15–37)
BACTERIA: ABNORMAL /HPF
BASOPHILS ABSOLUTE: 0 K/UL (ref 0–0.2)
BASOPHILS RELATIVE PERCENT: 0.3 %
BILIRUB SERPL-MCNC: 0.4 MG/DL (ref 0–1)
BILIRUBIN URINE: NEGATIVE
BLOOD, URINE: NEGATIVE
BUN BLDV-MCNC: 8 MG/DL (ref 7–20)
CALCIUM SERPL-MCNC: 10.4 MG/DL (ref 8.3–10.6)
CHLORIDE BLD-SCNC: 94 MMOL/L (ref 99–110)
CHOLESTEROL, TOTAL: 118 MG/DL (ref 0–199)
CLARITY: CLEAR
CO2: 25 MMOL/L (ref 21–32)
COLOR: YELLOW
CREAT SERPL-MCNC: 0.9 MG/DL (ref 0.6–1.2)
EOSINOPHILS ABSOLUTE: 0.1 K/UL (ref 0–0.6)
EOSINOPHILS RELATIVE PERCENT: 1 %
EPITHELIAL CELLS, UA: 13 /HPF (ref 0–5)
ESTIMATED AVERAGE GLUCOSE: 309.2 MG/DL
GFR AFRICAN AMERICAN: >60
GFR NON-AFRICAN AMERICAN: >60
GLUCOSE BLD-MCNC: 367 MG/DL (ref 70–99)
GLUCOSE URINE: >=1000 MG/DL
HBA1C MFR BLD: 12.4 %
HCT VFR BLD CALC: 45 % (ref 36–48)
HDLC SERPL-MCNC: 54 MG/DL (ref 40–60)
HEMOGLOBIN: 15 G/DL (ref 12–16)
HYALINE CASTS: 2 /LPF (ref 0–8)
KETONES, URINE: NEGATIVE MG/DL
LDL CHOLESTEROL CALCULATED: 49 MG/DL
LEUKOCYTE ESTERASE, URINE: NEGATIVE
LYMPHOCYTES ABSOLUTE: 2.7 K/UL (ref 1–5.1)
LYMPHOCYTES RELATIVE PERCENT: 33.5 %
MCH RBC QN AUTO: 30.6 PG (ref 26–34)
MCHC RBC AUTO-ENTMCNC: 33.4 G/DL (ref 31–36)
MCV RBC AUTO: 91.6 FL (ref 80–100)
MICROSCOPIC EXAMINATION: ABNORMAL
MONOCYTES ABSOLUTE: 0.6 K/UL (ref 0–1.3)
MONOCYTES RELATIVE PERCENT: 6.9 %
NEUTROPHILS ABSOLUTE: 4.8 K/UL (ref 1.7–7.7)
NEUTROPHILS RELATIVE PERCENT: 58.3 %
NITRITE, URINE: NEGATIVE
PDW BLD-RTO: 13.9 % (ref 12.4–15.4)
PH UA: 6 (ref 5–8)
PLATELET # BLD: 208 K/UL (ref 135–450)
PMV BLD AUTO: 12.6 FL (ref 5–10.5)
POTASSIUM SERPL-SCNC: 4.1 MMOL/L (ref 3.5–5.1)
PROTEIN UA: NEGATIVE MG/DL
RBC # BLD: 4.92 M/UL (ref 4–5.2)
RBC UA: 3 /HPF (ref 0–4)
SODIUM BLD-SCNC: 136 MMOL/L (ref 136–145)
SPECIFIC GRAVITY UA: >1.03 (ref 1–1.03)
TOTAL PROTEIN: 8.2 G/DL (ref 6.4–8.2)
TRIGL SERPL-MCNC: 75 MG/DL (ref 0–150)
URINE TYPE: ABNORMAL
UROBILINOGEN, URINE: 0.2 E.U./DL
VLDLC SERPL CALC-MCNC: 15 MG/DL
WBC # BLD: 8.2 K/UL (ref 4–11)
WBC UA: 6 /HPF (ref 0–5)
YEAST: PRESENT /HPF

## 2021-12-02 ENCOUNTER — TELEPHONE (OUTPATIENT)
Dept: ENDOCRINOLOGY | Age: 66
End: 2021-12-02

## 2021-12-02 LAB — URINE CULTURE, ROUTINE: NORMAL

## 2021-12-15 ENCOUNTER — OFFICE VISIT (OUTPATIENT)
Dept: ENDOCRINOLOGY | Age: 66
End: 2021-12-15
Payer: COMMERCIAL

## 2021-12-15 VITALS
BODY MASS INDEX: 45.99 KG/M2 | SYSTOLIC BLOOD PRESSURE: 144 MMHG | OXYGEN SATURATION: 98 % | DIASTOLIC BLOOD PRESSURE: 76 MMHG | WEIGHT: 293 LBS | HEART RATE: 91 BPM | HEIGHT: 67 IN

## 2021-12-15 DIAGNOSIS — I10 PRIMARY HYPERTENSION: ICD-10-CM

## 2021-12-15 DIAGNOSIS — E11.65 UNCONTROLLED TYPE 2 DIABETES MELLITUS WITH HYPERGLYCEMIA (HCC): Primary | ICD-10-CM

## 2021-12-15 PROCEDURE — 99214 OFFICE O/P EST MOD 30 MIN: CPT | Performed by: INTERNAL MEDICINE

## 2021-12-15 RX ORDER — DULAGLUTIDE 3 MG/.5ML
3 INJECTION, SOLUTION SUBCUTANEOUS WEEKLY
Qty: 13 PEN | Refills: 2 | Status: SHIPPED | OUTPATIENT
Start: 2021-12-15 | End: 2022-09-16 | Stop reason: ALTCHOICE

## 2021-12-15 RX ORDER — ATORVASTATIN CALCIUM 40 MG/1
TABLET, FILM COATED ORAL
Qty: 90 TABLET | Refills: 3 | Status: SHIPPED | OUTPATIENT
Start: 2021-12-15

## 2021-12-15 RX ORDER — EMPAGLIFLOZIN 10 MG/1
1 TABLET, FILM COATED ORAL DAILY
Qty: 90 TABLET | Refills: 1 | Status: SHIPPED | OUTPATIENT
Start: 2021-12-15 | End: 2022-09-16 | Stop reason: ALTCHOICE

## 2021-12-15 NOTE — PROGRESS NOTES
Seen as f/u patient for diabetes      Interm: On U-500 now for 2 weeks  Glucose still high  Trulicity 1.5  CGM pending    Not taking breakfast dose as does not eat      Diagnosed with Type 2 diabetes mellitus 8 years ago  Known diabetic complications: Retinopathy NPDR    Current diabetic medications   U-500  100 units TID w meal  SSI 5 for 50 > 419  trulicity    Previous:    U-500 70/80/70  But taking only with lunch n dinner   SSI 5 for 61>950  Trulicity    Moderate, uncontrolled    Previous:    Lantus 70/70    But missing second shot 3 days a week   But doing 66/66  Humalog 38 units AC TID, taking it 2/week    But doing 34   SSI 2 for 50>150  She has relative hypoglycemia in the 70s    H/o use of lantus, novolog, victoza, januvia    Metformin and ER caused GI symptoms    Last A1c 12.7%<---- 9.5%<----- 9.2%<----10.5%<-----9.3%<------9.9%<-----9.2%<------10.2 on 5/17<--- 8.4 on 12/16<-----7.8 on 6/16<------ 9.6 on 2/16<---- 10.5 on 10/15<-----11.8 on 8/15<---14.2 <--- 10.6 <--- 9.4    Prior visit with dietician: Yes   Current diet: on average, 3 meals per day does not follow low CHO  Current exercise: walking   Current monitoring regimen: home blood tests  She checks upto-5/day    Has brought blood glucose log/meter:   Home blood sugar records: 200-500  Any episodes of hypoglycemia? 44    No Hx of CAD , PVD, CVA    Hyperlipidemia:controlled on statin LDL 51 on 9/17  Simvastatin 40mg  LDL 58 on 3/20  Last eye exam: 2/20  Last foot exam: 12/21  Last microalbumin to creatinine ratio:8/20    HTN: On multiple medications, taking toprol 50mg?   Losartan 100mg verapamil 240mg Chlorthalidone 25mg clonidine 0.1mg TID  Uncontrolled    She has thyroid nodules, not on medication    Past Medical History:   Diagnosis Date    Anxiety and depression     Asthma     Edema     GERD (gastroesophageal reflux disease)     Hyperlipidemia     Hypertension     IBS (irritable bowel syndrome)     Irregular menstrual bleeding 2011    Dr. Azeb Arceo Morbid obesity [facility])     Multinodular goiter 2010    Personal history of colonic polyps     Dr. Hazel Porras- due for repeat colonoscopy     Positive PPD, treated     INH completed 2001    S/P cholecystectomy 2011    Type II or unspecified type diabetes mellitus without mention of complication, not stated as uncontrolled      Past Surgical History:   Procedure Laterality Date     SECTION N/A 30 plus years ago    x2    CHOLECYSTECTOMY      3/2009    COLONOSCOPY      2012 Dr. La Smith 5 yrs    COLONOSCOPY      repeat x 3 yrs HAD 5 POLPYS    HYSTEROSCOPY  2015    HYSTEROSCOPY, DILATATION AND CURETTAGE WITH NOVASURE ABLATION AND ABLATION    MYOMECTOMY      2003    TIBIA FRACTURE SURGERY      2006 w/ revision     Current Outpatient Medications   Medication Sig Dispense Refill    cloNIDine (CATAPRES) 0.1 MG tablet Take 1 tablet by mouth every 8 hours 270 tablet 3    diclofenac sodium 1 % GEL Apply 4 g topically 4 times daily 3 Tube 5    buPROPion (WELLBUTRIN XL) 300 MG extended release tablet Take 1 tablet by mouth every morning 90 tablet 3    chlorthalidone (HYGROTON) 25 MG tablet Take 1 tablet by mouth daily 30 tablet 3    busPIRone (BUSPAR) 15 MG tablet Take 15 mg by mouth 3 times daily as needed (anxiety) 90 tablet 3    atorvastatin (LIPITOR) 40 MG tablet TAKE 1 TABLET DAILY 90 tablet 3    FLUoxetine (PROZAC) 40 MG capsule TAKE 1 CAPSULE DAILY 90 capsule 3    Dulaglutide (TRULICITY) 1.68 DG/1.0OZ SOPN 0.75 mg weekly 12 pen 3    insulin regular human (HUMULIN R U-500 KWIKPEN) 500 UNIT/ML SOPN concentrated injection pen  units before breakfast and dinner 18 pen 1    betamethasone dipropionate (DIPROLENE) 0.05 % ointment APPLY TOPICALLY DAILY 90 g 3    verapamil (CALAN SR) 240 MG extended release tablet TAKE 1 TABLET NIGHTLY 90 tablet 3    metoprolol succinate (TOPROL XL) 50 MG extended release tablet Take 1 tablet by mouth nightly 30 tablet 3    losartan (COZAAR) 100 MG tablet TAKE 1 TABLET DAILY 90 tablet 4    montelukast (SINGULAIR) 10 MG tablet Take 1 tablet by mouth nightly 90 tablet 3    Insulin Pen Needle 32G X 4 MM MISC 1 each by Does not apply route daily 100 each 3    loratadine (CLARITIN) 10 MG tablet Take 10 mg by mouth daily      ONE TOUCH LANCETS MISC 1 each by Does not apply route daily 100 each 3     No current facility-administered medications for this visit. Review of Systems    Scanned, reviewed          5/21  Skeletal foot exam is normal, no skin lesions, toenails are normal,10 g monofilament is 10/10 on the right and 10/10 on the left  Right sole callus/thickening, no active discharge, no redness    Lab Reviewed   No components found for: CHLPL  Lab Results   Component Value Date    TRIG 54 03/14/2020    TRIG 75 01/15/2019    TRIG 60 09/21/2017     Lab Results   Component Value Date    HDL 55 03/14/2020    HDL 63 (H) 01/15/2019    HDL 66 (H) 09/21/2017     Lab Results   Component Value Date    LDLCALC 58 03/14/2020    LDLCALC 83 01/15/2019    LDLCALC 51 09/21/2017     Lab Results   Component Value Date    LABVLDL 11 03/14/2020    LABVLDL 15 01/15/2019    LABVLDL 12 09/21/2017     Lab Results   Component Value Date    LABA1C 10.5 12/31/2019       Assessment:     Rashard Bill is a 62 y.o. female with :    1.T2DM: Longstanding, uncontrolled. Discussed goals, provided education. Given her A1c and hyperglycemia,switched back to U-500 , discussed risk of lows, needs self monitoring. Advised to take insulin 30 min before the meal.Meal pattern were not regular, may skip breakfast dose, advised adherence. Has hyperglycemia, adjust dose and increase trulicity/add jardiance, BMP in 2 weeks  She was inquiring about DKA, advised last test did not show it  2. HTN. BP slightly elevated . Aldosterone and renin nl, lab did not check MN. May need aldactone  3. HLD: LDL at goal, on statin  4. Obesity: Discussed weight loss, refer to bariatrics  5. Depression  6. MNG: Per history-h/o FNA, reports FNA was benign years ago, will need USG, did not have done  7. ADELINA  8. Vitamin D deficiency    Plan:      U-500  125/125/120   Advise to check blood sugar 4 times a day   Patient to send blood sugar log for titration, weekly   Refer to diabetes education. Advise to low simple carbohydrate and protein with each  meal diet. Diabetes Care: recommend yearly eye exam, foot exam and urine microalbumin to   creatinine ratio.       -Hyperlipidemia, LDL goal is <100 mg/dl   -Hypertension; Continue same  -Daily ASA: Added 81mg   -Smoking status: Non smoker

## 2021-12-20 ENCOUNTER — TELEPHONE (OUTPATIENT)
Dept: ENDOCRINOLOGY | Age: 66
End: 2021-12-20

## 2021-12-22 ENCOUNTER — VIRTUAL VISIT (OUTPATIENT)
Dept: INTERNAL MEDICINE CLINIC | Age: 66
End: 2021-12-22
Payer: COMMERCIAL

## 2021-12-22 DIAGNOSIS — J06.9 VIRAL URI: ICD-10-CM

## 2021-12-22 DIAGNOSIS — R05.9 COUGH: Primary | ICD-10-CM

## 2021-12-22 PROCEDURE — 99213 OFFICE O/P EST LOW 20 MIN: CPT | Performed by: INTERNAL MEDICINE

## 2021-12-22 RX ORDER — PROMETHAZINE HYDROCHLORIDE AND CODEINE PHOSPHATE 6.25; 1 MG/5ML; MG/5ML
5 SYRUP ORAL EVERY 4 HOURS PRN
Qty: 240 ML | Refills: 1 | Status: SHIPPED | OUTPATIENT
Start: 2021-12-22 | End: 2022-10-17 | Stop reason: ALTCHOICE

## 2021-12-22 ASSESSMENT — ENCOUNTER SYMPTOMS
ABDOMINAL PAIN: 0
SHORTNESS OF BREATH: 0
WHEEZING: 0
COUGH: 1
SINUS PRESSURE: 1

## 2021-12-22 ASSESSMENT — PATIENT HEALTH QUESTIONNAIRE - PHQ9
SUM OF ALL RESPONSES TO PHQ QUESTIONS 1-9: 0
2. FEELING DOWN, DEPRESSED OR HOPELESS: 0
SUM OF ALL RESPONSES TO PHQ QUESTIONS 1-9: 0
SUM OF ALL RESPONSES TO PHQ9 QUESTIONS 1 & 2: 0
1. LITTLE INTEREST OR PLEASURE IN DOING THINGS: 0
SUM OF ALL RESPONSES TO PHQ QUESTIONS 1-9: 0

## 2021-12-22 NOTE — PROGRESS NOTES
Rashard Bill (:  1955) is a 73 y.o. female,Established patient, here for evaluation of the following chief complaint(s): Cough (coughing x 5 days clear phlegm,sneezing ,runny nose,tickle throat)         ASSESSMENT/PLAN:  1. Cough  -     promethazine-codeine (PHENERGAN WITH CODEINE) 6.25-10 MG/5ML syrup; Take 5 mLs by mouth every 4 hours as needed for Cough. , Disp-240 mL, R-1Normal  2. Viral URI  Assessment & Plan: To get PCR covid testing-use otc's and given phen w cod for cough      No follow-ups on file. SUBJECTIVE/OBJECTIVE:  Onset URI symptoms 4 days ago- no fever or myalgias but congestion,sore throat, cough. No sob. Has not had covid testing       Review of Systems   Constitutional: Positive for fatigue and fever. Negative for activity change. HENT: Positive for congestion and sinus pressure. Respiratory: Positive for cough. Negative for shortness of breath and wheezing. Gastrointestinal: Negative for abdominal pain. Neurological: Negative for syncope and headaches.        Patient-Reported Vitals 2021   Patient-Reported Weight 327lb   Patient-Reported Systolic -   Patient-Reported Diastolic -   Patient-Reported Temperature 98.0        Physical Exam    [INSTRUCTIONS:  \"[x]\" Indicates a positive item  \"[]\" Indicates a negative item  -- DELETE ALL ITEMS NOT EXAMINED]    Constitutional: [x] Appears well-developed and well-nourished [x] No apparent distress      [] Abnormal -     Mental status: [x] Alert and awake  [x] Oriented to person/place/time [x] Able to follow commands    [] Abnormal -     Eyes:   EOM    [x]  Normal    [] Abnormal -   Sclera  [x]  Normal    [] Abnormal -          Discharge [x]  None visible   [] Abnormal -     HENT: [x] Normocephalic, atraumatic  [] Abnormal -   [x] Mouth/Throat: Mucous membranes are moist    External Ears [x] Normal  [] Abnormal -    Neck: [x] No visualized mass [] Abnormal -     Pulmonary/Chest: [x] Respiratory effort normal   [x] No

## 2022-01-07 RX ORDER — BENZONATATE 200 MG/1
200 CAPSULE ORAL 3 TIMES DAILY PRN
Qty: 30 CAPSULE | Refills: 5 | Status: SHIPPED | OUTPATIENT
Start: 2022-01-07 | End: 2022-01-14

## 2022-01-26 ENCOUNTER — VIRTUAL VISIT (OUTPATIENT)
Dept: ENDOCRINOLOGY | Age: 67
End: 2022-01-26
Payer: COMMERCIAL

## 2022-01-26 DIAGNOSIS — I10 PRIMARY HYPERTENSION: ICD-10-CM

## 2022-01-26 DIAGNOSIS — E04.2 MULTIPLE THYROID NODULES: ICD-10-CM

## 2022-01-26 DIAGNOSIS — E11.65 UNCONTROLLED TYPE 2 DIABETES MELLITUS WITH HYPERGLYCEMIA (HCC): ICD-10-CM

## 2022-01-26 DIAGNOSIS — E11.65 UNCONTROLLED TYPE 2 DIABETES MELLITUS WITH HYPERGLYCEMIA (HCC): Primary | ICD-10-CM

## 2022-01-26 LAB
ANION GAP SERPL CALCULATED.3IONS-SCNC: 14 MMOL/L (ref 3–16)
BUN BLDV-MCNC: 11 MG/DL (ref 7–20)
CALCIUM SERPL-MCNC: 10 MG/DL (ref 8.3–10.6)
CHLORIDE BLD-SCNC: 93 MMOL/L (ref 99–110)
CO2: 30 MMOL/L (ref 21–32)
CREAT SERPL-MCNC: 0.8 MG/DL (ref 0.6–1.2)
CREATININE URINE: 116.8 MG/DL (ref 28–259)
GFR AFRICAN AMERICAN: >60
GFR NON-AFRICAN AMERICAN: >60
GLUCOSE BLD-MCNC: 354 MG/DL (ref 70–99)
MICROALBUMIN UR-MCNC: <1.2 MG/DL
MICROALBUMIN/CREAT UR-RTO: NORMAL MG/G (ref 0–30)
POTASSIUM SERPL-SCNC: 4 MMOL/L (ref 3.5–5.1)
SODIUM BLD-SCNC: 137 MMOL/L (ref 136–145)

## 2022-01-26 PROCEDURE — 99214 OFFICE O/P EST MOD 30 MIN: CPT | Performed by: INTERNAL MEDICINE

## 2022-01-26 RX ORDER — INSULIN HUMAN 500 [IU]/ML
INJECTION, SOLUTION SUBCUTANEOUS
Qty: 36 EACH | Refills: 3 | Status: SHIPPED | OUTPATIENT
Start: 2022-01-26 | End: 2022-01-28 | Stop reason: SDUPTHER

## 2022-01-26 NOTE — PROGRESS NOTES
Seen as f/u patient for diabetes      Patient was evaluated through a synchronous (real-time) audio-video  encounter. The patient (or guardian if applicable) is aware that this is a billable service, which includes applicable co-pays. This Virtual Visit was  conducted with patient's (and/or legal guardian's) consent. The visit was conducted pursuant to the emergency declaration under the [address] [place] [address] and the [employer] General Act. Patient identification was verified,  and a caregiver was present when appropriate. The patient was located in a state where the provider was licensed to provide care. Interm:     On U-500, taking  She has CGM, needs to start    Not taking breakfast dose as does not eat      Diagnosed with Type 2 diabetes mellitus 8 years ago  Known diabetic complications: Retinopathy NPDR    Current diabetic medications   U-500  160/160/160  SSI 5 for 50 > 972  trulicity  Jardiance [id number]    Previous:    U-500 70/80/70  But taking only with lunch n dinner   SSI 5 for 34>050  Trulicity    Moderate, uncontrolled    Previous:    Lantus 70/70    But missing second shot 3 days a week   But doing 66/66  Humalog 38 units AC TID, taking it 2/week    But doing 34   SSI 2 for 50>150  She has relative hypoglycemia in the 70s    H/o use of lantus, novolog, victoza, januvia    Metformin and ER caused GI symptoms    Last A1c 12.7%<---- 9.5%<----- 9.2%<----10.5%<-----9.3%<------9.9%<-----9.2%<------10.2 on 5/17<--- 8.4 on 12/16<-----7.8 on 6/16<------ 9.6 on 2/16<---- 10.5 on 10/15<-----11.8 on 8/15<---14.2 <--- 10.6 <--- 9.4    Prior visit with dietician: Yes   Current diet: on average, 3 meals per day does not follow low CHO  Current exercise: walking   Current monitoring regimen: home blood tests  She checks upto-5/day    Has brought blood glucose log/meter:   Home blood sugar records: 200-400  Any episodes of hypoglycemia? 44    No Hx of CAD , PVD, CVA    Hyperlipidemia:controlled on statin LDL 51 on   Simvastatin 40mg  LDL 58 on 3/20  Last eye exam:   Last foot exam:   Last microalbumin to creatinine ratio:    HTN: On multiple medications, taking toprol 50mg?   Losartan 100mg verapamil 240mg Chlorthalidone 25mg clonidine 0.1mg TID  Uncontrolled    She has thyroid nodules, not on medication    Past Medical History:   Diagnosis Date    Anxiety and depression     Asthma     Edema     GERD (gastroesophageal reflux disease)     Hyperlipidemia     Hypertension     IBS (irritable bowel syndrome)     Irregular menstrual bleeding 2011    Dr. Cheo Shetty obesity ([facility]     Multinodular goiter 2010    Personal history of colonic polyps     Dr. Lorenzo Griffin- due for repeat colonoscopy     Positive PPD, treated     INH completed 2001    S/P cholecystectomy 2011    Type II or unspecified type diabetes mellitus without mention of complication, not stated as uncontrolled      Past Surgical History:   Procedure Laterality Date     SECTION N/A 30 plus years ago    x2    CHOLECYSTECTOMY      3/2009    COLONOSCOPY      2012 Dr. Maria C Pope 5 yrs    COLONOSCOPY      repeat x 3 yrs HAD 5 POLPYS    HYSTEROSCOPY  2015    HYSTEROSCOPY, DILATATION AND CURETTAGE WITH NOVASURE ABLATION AND ABLATION    MYOMECTOMY      2003    TIBIA FRACTURE SURGERY      2006 w/ revision     Current Outpatient Medications   Medication Sig Dispense Refill    cloNIDine (CATAPRES) 0.1 MG tablet Take 1 tablet by mouth every 8 hours 270 tablet 3    diclofenac sodium 1 % GEL Apply 4 g topically 4 times daily 3 Tube 5    buPROPion (WELLBUTRIN XL) 300 MG extended release tablet Take 1 tablet by mouth every morning 90 tablet 3    chlorthalidone (HYGROTON) 25 MG tablet Take 1 tablet by mouth daily 30 tablet 3    busPIRone (BUSPAR) 15 MG tablet Take 15 mg by mouth 3 times daily as needed (anxiety) 90 tablet 3    atorvastatin (LIPITOR) 40 MG tablet TAKE 1 TABLET DAILY 90 tablet 3    FLUoxetine (PROZAC) 40 MG capsule TAKE 1 CAPSULE DAILY 90 capsule 3    Dulaglutide (TRULICITY) 8.75 RB/9.3KP SOPN 0.75 mg weekly 12 pen 3    insulin regular human (HUMULIN R U-500 KWIKPEN) 500 UNIT/ML SOPN concentrated injection pen  units before breakfast and dinner 18 pen 1    betamethasone dipropionate (DIPROLENE) 0.05 % ointment APPLY TOPICALLY DAILY 90 g 3    verapamil (CALAN SR) 240 MG extended release tablet TAKE 1 TABLET NIGHTLY 90 tablet 3    metoprolol succinate (TOPROL XL) 50 MG extended release tablet Take 1 tablet by mouth nightly 30 tablet 3    losartan (COZAAR) 100 MG tablet TAKE 1 TABLET DAILY 90 tablet 4    montelukast (SINGULAIR) 10 MG tablet Take 1 tablet by mouth nightly 90 tablet 3    Insulin Pen Needle 32G X 4 MM MISC 1 each by Does not apply route daily 100 each 3    loratadine (CLARITIN) 10 MG tablet Take 10 mg by mouth daily      ONE TOUCH LANCETS MISC 1 each by Does not apply route daily 100 each 3     No current facility-administered medications for this visit. Review of Systems    Constitutional: Negative for weight loss and malaise/fatigue. Negative for fever and chills. HENT: Negative for hearing loss, ear pain, nosebleeds, neck pain and tinnitus. Eyes: Negative for blurred vision. Negative for double vision, photophobia and pain. Respiratory: Negative for cough and sputum production. +SOB  Cardiovascular: Negative for chest pain, palpitations and leg swelling. Gastrointestinal: Negative for nausea, vomiting and abdominal pain. Genitourinary: Negative for dysuria, urgency and +frequency. Musculoskeletal: Negative for back pain. No joint pain  Skin: Negative for itching and rash. Neurological:+ for dizziness. Negative for tingling, tremors, focal weakness and +headaches.    Endo/Heme/Allergies: see HPI  Psychiatric/Behavioral: Negative for depression and substance abuse. PHYSICAL EXAMINATION:  [ INSTRUCTIONS:  \"[x]\" Indicates a positive item  \"[]\" Indicates a negative item  -- DELETE ALL ITEMS NOT EXAMINED]  Vital Signs: (As obtained by patient/caregiver or practitioner observation)    Blood pressure-  Heart rate-    Respiratory rate-    Temperature-  Pulse oximetry-     Constitutional Appears well-developed and well-nourished No apparent distress        Mental status  Alert and awake  Oriented to person/place/time  Able to follow commands      Eyes:  EOM    [x]  Normal    Sclera  [x]  Normal           Discharge [x]  None visible      HENT:   [x] Normocephalic, atraumatic. [x] Mouth/Throat: Mucous membranes are moist.     External Ears [x] Normal  no discharge    Neck: [x] No visualized mass  no swelling    Pulmonary/Chest: [x] Respiratory effort normal.  [x] No visualized signs of difficulty breathing or respiratory distress             Musculoskeletal:   [x] Normal gait with no signs of ataxia         [x] Normal range of motion of neck          Head and neck stable, appears normal ROM, strength good    Neurological:        [x] No Facial Asymmetry (Cranial nerve 7 motor function) (limited exam to video visit)          [x] No gaze palsy                Skin:        [x] No significant exanthematous lesions or discoloration noted on facial skin                 Psychiatric:       [x] Normal Affect [x] No Hallucinations            Other pertinent observable physical exam findings-     Due to this being a TeleHealth encounter, evaluation of the following organ systems is limited: Vitals/Constitutional/EENT/Resp/CV/GI//MS/Neuro/Skin/Heme-Lymph-Imm. Services were provided through a video synchronous discussion virtually to substitute for in-person clinic visit.                5/21  Skeletal foot exam is normal, no skin lesions, toenails are normal,10 g monofilament is 10/10 on the right and 10/10 on the left  Right sole callus/thickening, no active discharge, no redness    Lab Reviewed   No components found for: CHLPL  Lab Results   Component Value Date    TRIG 54 03/14/2020    TRIG 75 01/15/2019    TRIG 60 09/21/2017     Lab Results   Component Value Date    HDL 55 03/14/2020    HDL 63 (H) 01/15/2019    HDL 66 (H) 09/21/2017     Lab Results   Component Value Date    LDLCALC 58 03/14/2020    LDLCALC 83 01/15/2019    LDLCALC 51 09/21/2017     Lab Results   Component Value Date    LABVLDL 11 03/14/2020    LABVLDL 15 01/15/2019    LABVLDL 12 09/21/2017     Lab Results   Component Value Date    LABA1C 10.5 12/31/2019       Assessment:     Marni Bingham is a 62 y.o. female with :    1.T2DM: Longstanding, uncontrolled. Discussed goals, provided education. Given her A1c and hyperglycemia,switched back to U-500 , discussed risk of lows, needs self monitoring. Advised to take insulin 30 min before the meal.Meal pattern were not regular, may skip breakfast dose, advised adherence. Has hyperglycemia,stressed importance of taking all three doses  Reviewed log, increase insulin dose as requiring more on SSI  2.HTN. [employer] [employer] Aldosterone and renin nl, lab did not check MN. May need aldactone  3. HLD: LDL at goal, on statin  4. Obesity: Discussed weight loss, refer to bariatrics  5. Depression  6. MNG: Per history-h/o FNA, reports FNA was benign years ago, will need USG, did not have done  7. ADELINA  8. Vitamin D deficiency    Plan:      Come to office for CGM   U-500  180/180/180   Advise to check blood sugar 4 times a day   Patient to send blood sugar log for titration, weekly   Refer to diabetes education. Advise to low simple carbohydrate and protein with each  meal diet. Diabetes Care: recommend yearly eye exam, foot exam and urine microalbumin to   creatinine ratio.       -Hyperlipidemia, LDL goal is <100 mg/dl   -Hypertension; Continue same  -Daily ASA: Added 81mg   -Smoking status: Non smoker

## 2022-01-27 ENCOUNTER — TELEPHONE (OUTPATIENT)
Dept: ENDOCRINOLOGY | Age: 67
End: 2022-01-27

## 2022-01-27 RX ORDER — LOSARTAN POTASSIUM 100 MG/1
TABLET ORAL
Qty: 90 TABLET | Refills: 3 | Status: SHIPPED | OUTPATIENT
Start: 2022-01-27

## 2022-01-27 RX ORDER — VERAPAMIL HYDROCHLORIDE 240 MG/1
TABLET, FILM COATED, EXTENDED RELEASE ORAL
Qty: 90 TABLET | Refills: 3 | Status: SHIPPED | OUTPATIENT
Start: 2022-01-27

## 2022-01-27 NOTE — TELEPHONE ENCOUNTER
[facility] pharm called stating that they needs to clarify the directionsfor the humulin R U500 Quick pen

## 2022-01-28 ENCOUNTER — TELEPHONE (OUTPATIENT)
Dept: ENDOCRINOLOGY | Age: 67
End: 2022-01-28

## 2022-01-28 RX ORDER — INSULIN HUMAN 500 [IU]/ML
INJECTION, SOLUTION SUBCUTANEOUS
Qty: 36 EACH | Refills: 3 | Status: SHIPPED | OUTPATIENT
Start: 2022-01-28 | End: 2022-02-08 | Stop reason: SDUPTHER

## 2022-01-28 NOTE — TELEPHONE ENCOUNTER
Pharmacy needs clarification on directions for patient's insulin regular human (HUMULIN R U-500 KWIKPEN) 500 UNIT/ML SOPN concentrated injection           [facility] [address]  [address] [phone] - F [phone]   [place] [address] [address] [address]   Phone:  [phone]  Fax:  [phone]

## 2022-01-28 NOTE — TELEPHONE ENCOUNTER
Medication:   Requested Prescriptions     Pending Prescriptions Disp Refills    insulin regular human (HUMULIN R U-500 KWIKPEN) 500 UNIT/ML SOPN concentrated injection pen 36 each 3     Si-210 units, take 30 units before the meals       Last Filled:      Patient Phone Number: [phone] (home)     Last appt: 2022   Next appt: Visit date not found    Last Labs DM:   Lab Results   Component Value Date    LABA1C 12.4 2021

## 2022-02-02 ENCOUNTER — NURSE ONLY (OUTPATIENT)
Dept: ENDOCRINOLOGY | Age: 67
End: 2022-02-02

## 2022-02-02 NOTE — PROGRESS NOTES
Educated pt on free style\\ did place up under the arm\\ adv pt if there are any issues to call and we can place an other on

## 2022-02-04 ENCOUNTER — PATIENT MESSAGE (OUTPATIENT)
Dept: ENDOCRINOLOGY | Age: 67
End: 2022-02-04

## 2022-02-08 RX ORDER — INSULIN HUMAN 500 [IU]/ML
INJECTION, SOLUTION SUBCUTANEOUS
Qty: 36 EACH | Refills: 3 | Status: SHIPPED | OUTPATIENT
Start: 2022-02-08 | End: 2022-02-18 | Stop reason: SDUPTHER

## 2022-02-09 ENCOUNTER — TELEPHONE (OUTPATIENT)
Dept: ENDOCRINOLOGY | Age: 67
End: 2022-02-09

## 2022-02-09 NOTE — TELEPHONE ENCOUNTER
Justin at [facility] regarding the Rx for humulin R U500 Quick Pens. Pharm has a few questions says that the Directions need to be corrected.   Call Ref # [id number]     Pharm # [phone]

## 2022-02-09 NOTE — TELEPHONE ENCOUNTER
Pharmacy is requesting clarification on patient's insulin regular human (HUMULIN R U-500 KWIKPEN) 500 UNIT/ML SOPN concentrated injection pen directions (instructions)          [address], [address] [place] [address] [address] [address]   Phone:  [phone]  Fax:  [phone]

## 2022-02-14 ENCOUNTER — HOSPITAL ENCOUNTER (OUTPATIENT)
Dept: MAMMOGRAPHY | Age: 67
Discharge: HOME | End: 2022-02-14
Payer: MEDICARE

## 2022-02-14 ENCOUNTER — TELEPHONE (OUTPATIENT)
Dept: ENDOCRINOLOGY | Age: 67
End: 2022-02-14

## 2022-02-14 VITALS — WEIGHT: 293 LBS | HEIGHT: 66 IN | BODY MASS INDEX: 47.09 KG/M2

## 2022-02-14 DIAGNOSIS — Z12.31 VISIT FOR SCREENING MAMMOGRAM: ICD-10-CM

## 2022-02-14 PROCEDURE — 77067 SCR MAMMO BI INCL CAD: CPT

## 2022-02-14 NOTE — TELEPHONE ENCOUNTER
Pharmacy needs clarification on directions for patient's insulin regular human (HUMULIN R U-500 KWIKPEN) 500 UNIT/ML SOPN concentrated injection pen           [address] , [address] [place] [address] [phone] - f [phone]   [place] [address]   Phone:  [phone]  Fax:  [phone]

## 2022-02-18 ENCOUNTER — HOSPITAL ENCOUNTER (OUTPATIENT)
Dept: GENERAL RADIOLOGY | Age: 67
Discharge: HOME | End: 2022-02-18
Payer: MEDICARE

## 2022-02-18 ENCOUNTER — APPOINTMENT (OUTPATIENT)
Dept: ULTRASOUND IMAGING | Age: 67
End: 2022-02-18
Payer: MEDICARE

## 2022-02-18 DIAGNOSIS — Z78.0 MENOPAUSE: ICD-10-CM

## 2022-02-18 DIAGNOSIS — Z13.820 SCREENING FOR OSTEOPOROSIS: ICD-10-CM

## 2022-02-18 PROCEDURE — 77080 DXA BONE DENSITY AXIAL: CPT

## 2022-02-18 RX ORDER — INSULIN HUMAN 500 [IU]/ML
INJECTION, SOLUTION SUBCUTANEOUS
Qty: 36 EACH | Refills: 3 | Status: SHIPPED | OUTPATIENT
Start: 2022-02-18 | End: 2022-03-25 | Stop reason: SDUPTHER

## 2022-03-08 ENCOUNTER — PATIENT MESSAGE (OUTPATIENT)
Dept: ENDOCRINOLOGY | Age: 67
End: 2022-03-08

## 2022-03-08 NOTE — TELEPHONE ENCOUNTER
From: Nina Vitale  To: Dr. Dileep Pham: 3/8/2022 4:04 PM EST  Subject: Needle for Insulin Pen    Hi! would you please send a script to [facility] for my inulin pe needles. I use BD Louise Ultra Fine 4mm. [facility] Phone # [phone], Located at [facility]. If you have any questions, feel free to contact me via [employer] or call me [phone]. It is not a rush. Thank you!

## 2022-03-25 ENCOUNTER — OFFICE VISIT (OUTPATIENT)
Dept: ENDOCRINOLOGY | Age: 67
End: 2022-03-25
Payer: MEDICARE

## 2022-03-25 DIAGNOSIS — E78.49 OTHER HYPERLIPIDEMIA: ICD-10-CM

## 2022-03-25 DIAGNOSIS — E11.65 UNCONTROLLED TYPE 2 DIABETES MELLITUS WITH HYPERGLYCEMIA (HCC): Primary | ICD-10-CM

## 2022-03-25 LAB
ANION GAP SERPL CALCULATED.3IONS-SCNC: 12 MMOL/L (ref 3–16)
BUN BLDV-MCNC: 10 MG/DL (ref 7–20)
CALCIUM SERPL-MCNC: 9.6 MG/DL (ref 8.3–10.6)
CHLORIDE BLD-SCNC: 100 MMOL/L (ref 99–110)
CO2: 28 MMOL/L (ref 21–32)
CREAT SERPL-MCNC: 0.7 MG/DL (ref 0.6–1.2)
CREATININE URINE: 109.6 MG/DL (ref 28–259)
GFR AFRICAN AMERICAN: >60
GFR NON-AFRICAN AMERICAN: >60
GLUCOSE BLD-MCNC: 213 MG/DL (ref 70–99)
MICROALBUMIN UR-MCNC: <1.2 MG/DL
MICROALBUMIN/CREAT UR-RTO: NORMAL MG/G (ref 0–30)
POTASSIUM SERPL-SCNC: 3.7 MMOL/L (ref 3.5–5.1)
SODIUM BLD-SCNC: 140 MMOL/L (ref 136–145)

## 2022-03-25 PROCEDURE — 99214 OFFICE O/P EST MOD 30 MIN: CPT | Performed by: INTERNAL MEDICINE

## 2022-03-25 RX ORDER — INSULIN HUMAN 500 [IU]/ML
INJECTION, SOLUTION SUBCUTANEOUS
Qty: 38 EACH | Refills: 1 | Status: SHIPPED | OUTPATIENT
Start: 2022-03-25 | End: 2022-05-06 | Stop reason: SDUPTHER

## 2022-03-25 NOTE — PROGRESS NOTES
Seen as f/u patient for diabetes      Patient was evaluated through a synchronous (real-time) audio-video  encounter. The patient (or guardian if applicable) is aware that this is a billable service, which includes applicable co-pays. This Virtual Visit was  conducted with patient's (and/or legal guardian's) consent. The visit was conducted pursuant to the emergency declaration under the [address] [place] [address] and the [employer] and [employer] Act. Patient identification was verified,  and a caregiver was present when appropriate. The patient was located in a state where the provider was licensed to provide care. Interm:     On U-500, taking  She has CGM  May miss occasionally the insulin    Not taking breakfast dose as does not eat      Diagnosed with Type 2 diabetes mellitus 8 years ago  Known diabetic complications: Retinopathy NPDR    Current diabetic medications   U-500  180/180/180  SSI 5 for 50 > 369  trulicity  Jardiance [id number]    Previous:    U-500 70/80/70  But taking only with lunch n dinner   SSI 5 for 36>801  Trulicity    Moderate, uncontrolled    Previous:    Lantus 70/70    But missing second shot 3 days a week   But doing 66/66  Humalog 38 units AC TID, taking it 2/week    But doing 34   SSI 2 for 50>150  She has relative hypoglycemia in the 70s    H/o use of lantus, novolog, victoza, januvia    Metformin and ER caused GI symptoms    Last A1c 12.7%<---- 9.5%<----- 9.2%<----10.5%<-----9.3%<------9.9%<-----9.2%<------10.2 on 5/17<--- 8.4 on 12/16<-----7.8 on 6/16<------ 9.6 on 2/16<---- 10.5 on 10/15<-----11.8 on 8/15<---14.2 <--- 10.6 <--- 9.4    Prior visit with dietician: Yes   Current diet: on average, 3 meals per day does not follow low CHO  Current exercise: walking   Current monitoring regimen: home blood tests  She uses CGM  Has brought blood glucose log/meter:   Home blood sugar records: 159-200  Any episodes of hypoglycemia? 44    No Hx of CAD , PVD, CVA    Hyperlipidemia:controlled on statin LDL 51 on   Simvastatin 40mg  LDL 58 on 3/20  LDL 49 on   Last eye exam:   Last foot exam:   Last microalbumin to creatinine ratio:    HTN: On multiple medications, taking toprol 50mg?   Losartan 100mg verapamil 240mg Chlorthalidone 25mg clonidine 0.1mg TID  Uncontrolled    She has thyroid nodules, not on medication    Past Medical History:   Diagnosis Date    Anxiety and depression     Asthma     Edema     GERD (gastroesophageal reflux disease)     Hyperlipidemia     Hypertension     IBS (irritable bowel syndrome)     Irregular menstrual bleeding 2011    Dr. Minesh Raygoza obesity ([address]     Multinodular goiter 2010    Personal history of colonic polyps     Dr. Marlon Woodson- due for repeat colonoscopy     Positive PPD, treated     INH completed 2001    S/P cholecystectomy 2011    Type II or unspecified type diabetes mellitus without mention of complication, not stated as uncontrolled      Past Surgical History:   Procedure Laterality Date     SECTION N/A 30 plus years ago    x2    CHOLECYSTECTOMY      3/2009    COLONOSCOPY      2012 Dr. hSefali Dc 5 yrs    COLONOSCOPY      repeat x 3 yrs HAD 5 POLPYS    HYSTEROSCOPY  2015    HYSTEROSCOPY, DILATATION AND CURETTAGE WITH NOVASURE ABLATION AND ABLATION    MYOMECTOMY      2003    TIBIA FRACTURE SURGERY      2006 w/ revision     Current Outpatient Medications   Medication Sig Dispense Refill    cloNIDine (CATAPRES) 0.1 MG tablet Take 1 tablet by mouth every 8 hours 270 tablet 3    diclofenac sodium 1 % GEL Apply 4 g topically 4 times daily 3 Tube 5    buPROPion (WELLBUTRIN XL) 300 MG extended release tablet Take 1 tablet by mouth every morning 90 tablet 3    chlorthalidone (HYGROTON) 25 MG tablet Take 1 tablet by mouth daily 30 tablet 3    busPIRone (BUSPAR) 15 MG tablet Take 15 mg by mouth 3 times daily as needed (anxiety) 90 tablet 3    atorvastatin (LIPITOR) 40 MG tablet TAKE 1 TABLET DAILY 90 tablet 3    FLUoxetine (PROZAC) 40 MG capsule TAKE 1 CAPSULE DAILY 90 capsule 3    Dulaglutide (TRULICITY) 9.28 ZG/9.8OO SOPN 0.75 mg weekly 12 pen 3    insulin regular human (HUMULIN R U-500 KWIKPEN) 500 UNIT/ML SOPN concentrated injection pen  units before breakfast and dinner 18 pen 1    betamethasone dipropionate (DIPROLENE) 0.05 % ointment APPLY TOPICALLY DAILY 90 g 3    verapamil (CALAN SR) 240 MG extended release tablet TAKE 1 TABLET NIGHTLY 90 tablet 3    metoprolol succinate (TOPROL XL) 50 MG extended release tablet Take 1 tablet by mouth nightly 30 tablet 3    losartan (COZAAR) 100 MG tablet TAKE 1 TABLET DAILY 90 tablet 4    montelukast (SINGULAIR) 10 MG tablet Take 1 tablet by mouth nightly 90 tablet 3    Insulin Pen Needle 32G X 4 MM MISC 1 each by Does not apply route daily 100 each 3    loratadine (CLARITIN) 10 MG tablet Take 10 mg by mouth daily      ONE TOUCH LANCETS MISC 1 each by Does not apply route daily 100 each 3     No current facility-administered medications for this visit. Review of Systems    Constitutional: Negative for weight loss and malaise/fatigue. Negative for fever and chills. HENT: Negative for hearing loss, ear pain, nosebleeds, neck pain and tinnitus. Eyes: Negative for blurred vision. Negative for double vision, photophobia and pain. Respiratory: Negative for cough and sputum production. +SOB  Cardiovascular: Negative for chest pain, palpitations and leg swelling. Gastrointestinal: Negative for nausea, vomiting and abdominal pain. Genitourinary: Negative for dysuria, urgency and +frequency. Musculoskeletal: Negative for back pain. No joint pain  Skin: Negative for itching and rash. Neurological:+ for dizziness. Negative for tingling, tremors, focal weakness and +headaches.    Endo/Heme/Allergies: see HPI  Psychiatric/Behavioral: callus/thickening, no active discharge, no redness    Lab Reviewed   No components found for: CHLPL  Lab Results   Component Value Date    TRIG 54 03/14/2020    TRIG 75 01/15/2019    TRIG 60 09/21/2017     Lab Results   Component Value Date    HDL 55 03/14/2020    HDL 63 (H) 01/15/2019    HDL 66 (H) 09/21/2017     Lab Results   Component Value Date    LDLCALC 58 03/14/2020    LDLCALC 83 01/15/2019    LDLCALC 51 09/21/2017     Lab Results   Component Value Date    LABVLDL 11 03/14/2020    LABVLDL 15 01/15/2019    LABVLDL 12 09/21/2017     Lab Results   Component Value Date    LABA1C 10.5 12/31/2019       Assessment:     Maria Eugenia Oswald is a 62 y.o. female with :    1.T2DM: Longstanding, uncontrolled. Discussed goals, provided education. Given her A1c and hyperglycemia,switched back to U-500 , discussed risk of lows, needs self monitoring. Advised to take insulin 30 min before the meal.Meal pattern were not regular, may skip breakfast dose, advised adherence. Has hyperglycemia,stressed importance of taking all three doses. Adjust dose  2. HTN. Ra Garcia Aldosterone and renin nl, lab did not check MN. May need aldactone  3. HLD: LDL at goal, on statin  4. Obesity: Discussed weight loss, refer to bariatrics  5. Depression  6. MNG: Per history-h/o FNA, reports FNA was benign years ago, will need USG, did not have done  7. ADELINA  8. Vitamin D deficiency    Plan:      U-500  195/190/190   Advise to check blood sugar 4 times a day   Patient to send blood sugar log for titration, weekly   Refer to diabetes education. Advise to low simple carbohydrate and protein with each  meal diet. Diabetes Care: recommend yearly eye exam, foot exam and urine microalbumin to   creatinine ratio.       -Hyperlipidemia, LDL goal is <100 mg/dl   -Hypertension; Continue same  -Daily ASA: Added 81mg   -Smoking status: Non smoker

## 2022-04-01 ENCOUNTER — TELEPHONE (OUTPATIENT)
Dept: ADMINISTRATIVE | Age: 67
End: 2022-04-01

## 2022-04-01 RX ORDER — CYCLOBENZAPRINE HCL 10 MG
10 TABLET ORAL 3 TIMES DAILY PRN
Qty: 30 TABLET | Refills: 1 | Status: SHIPPED | OUTPATIENT
Start: 2022-04-01 | End: 2022-04-11

## 2022-04-01 NOTE — TELEPHONE ENCOUNTER
Submitted PA for Cyclobenzaprine HCl 10MG tablets Via CMM Key: [id number] STATUS: APPROVED effective 03/02/2022-04/01/2023    Please notify patient.  Thank you

## 2022-04-04 RX ORDER — CYCLOBENZAPRINE HCL 10 MG
TABLET ORAL
Qty: 30 TABLET | Refills: 5 | Status: SHIPPED | OUTPATIENT
Start: 2022-04-04

## 2022-05-06 ENCOUNTER — OFFICE VISIT (OUTPATIENT)
Dept: ENDOCRINOLOGY | Age: 67
End: 2022-05-06
Payer: MEDICARE

## 2022-05-06 DIAGNOSIS — E78.49 OTHER HYPERLIPIDEMIA: ICD-10-CM

## 2022-05-06 DIAGNOSIS — E11.65 UNCONTROLLED TYPE 2 DIABETES MELLITUS WITH HYPERGLYCEMIA (HCC): Primary | ICD-10-CM

## 2022-05-06 PROCEDURE — 99214 OFFICE O/P EST MOD 30 MIN: CPT | Performed by: INTERNAL MEDICINE

## 2022-05-06 RX ORDER — INSULIN HUMAN 500 [IU]/ML
INJECTION, SOLUTION SUBCUTANEOUS
Qty: 45 EACH | Refills: 1 | Status: SHIPPED | OUTPATIENT
Start: 2022-05-06 | End: 2022-07-25 | Stop reason: SDUPTHER

## 2022-05-06 NOTE — PROGRESS NOTES
Seen as f/u patient for diabetes      Patient was evaluated through a synchronous (real-time) audio-video  encounter. The patient (or guardian if applicable) is aware that this is a billable service, which includes applicable co-pays. This Virtual Visit was  conducted with patient's (and/or legal guardian's) consent. The visit was conducted pursuant to the emergency declaration under the [address] [place] [address] and the [employer] Act. Patient identification was verified,  and a caregiver was present when appropriate. The patient was located in a state where the provider was licensed to provide care. Interm:     On U-500, taking  She has CGM  Hyperglycemia  Gives extra injection of 220 units when glucose very high  Requiring more insulin based on scale  +Stress  Eats out    Not taking breakfast dose as does not eat      Diagnosed with Type 2 diabetes mellitus 8 years ago  Known diabetic complications: Retinopathy NPDR    Current diabetic medications   U-500  195/190/190  SSI 5 for 50 > 513  trulicity  Jardiance [id number]    Previous:    U-500 70/80/70  But taking only with lunch n dinner   SSI 5 for 61>047  Trulicity    Moderate, uncontrolled    Previous:    Lantus 70/70    But missing second shot 3 days a week   But doing 66/66  Humalog 38 units AC TID, taking it 2/week    But doing 34   SSI 2 for 50>150  She has relative hypoglycemia in the 70s    H/o use of lantus, novolog, victoza, januvia    Metformin and ER caused GI symptoms    Last A1c 12.7%<---- 9.5%<----- 9.2%<----10.5%<-----9.3%<------9.9%<-----9.2%<------10.2 on 5/17<--- 8.4 on 12/16<-----7.8 on 6/16<------ 9.6 on 2/16<---- 10.5 on 10/15<-----11.8 on 8/15<---14.2 <--- 10.6 <--- 9.4    Prior visit with dietician: Yes   Current diet: on average, 3 meals per day does not follow low CHO  Current exercise: walking   Current monitoring regimen: home blood tests She uses CGM  Has brought blood glucose log/meter:   Home blood sugar records: 200-300  Any episodes of hypoglycemia? 44    No Hx of CAD , PVD, CVA    Hyperlipidemia:controlled on statin LDL 51 on   Simvastatin 40mg  LDL 58 on 3/20  LDL 49 on   Last eye exam:   Last foot exam:   Last microalbumin to creatinine ratio:3/22    HTN: On multiple medications, taking toprol 50mg?   Losartan 100mg verapamil 240mg Chlorthalidone 25mg clonidine 0.1mg TID  Uncontrolled    She has thyroid nodules, not on medication    Past Medical History:   Diagnosis Date    Anxiety and depression     Asthma     Edema     GERD (gastroesophageal reflux disease)     Hyperlipidemia     Hypertension     IBS (irritable bowel syndrome)     Irregular menstrual bleeding 2011    Dr. Taye Dos Santos obesity ([address]     Multinodular goiter 2010    Personal history of colonic polyps     Dr. Sander Mclaughlin- due for repeat colonoscopy     Positive PPD, treated     INH completed 2001    S/P cholecystectomy 2011    Type II or unspecified type diabetes mellitus without mention of complication, not stated as uncontrolled      Past Surgical History:   Procedure Laterality Date     SECTION N/A 30 plus years ago    x2    CHOLECYSTECTOMY      3/2009    COLONOSCOPY      2012 Dr. Bran Owen 5 yrs    COLONOSCOPY      repeat x 3 yrs HAD 5 POLPYS    HYSTEROSCOPY  2015    HYSTEROSCOPY, DILATATION AND CURETTAGE WITH NOVASURE ABLATION AND ABLATION    MYOMECTOMY      2003    TIBIA FRACTURE SURGERY      2006 w/ revision     Current Outpatient Medications   Medication Sig Dispense Refill    cloNIDine (CATAPRES) 0.1 MG tablet Take 1 tablet by mouth every 8 hours 270 tablet 3    diclofenac sodium 1 % GEL Apply 4 g topically 4 times daily 3 Tube 5    buPROPion (WELLBUTRIN XL) 300 MG extended release tablet Take 1 tablet by mouth every morning 90 tablet 3    chlorthalidone (HYGROTON) 25 MG tablet Take 1 tablet by mouth daily 30 tablet 3    busPIRone (BUSPAR) 15 MG tablet Take 15 mg by mouth 3 times daily as needed (anxiety) 90 tablet 3    atorvastatin (LIPITOR) 40 MG tablet TAKE 1 TABLET DAILY 90 tablet 3    FLUoxetine (PROZAC) 40 MG capsule TAKE 1 CAPSULE DAILY 90 capsule 3    Dulaglutide (TRULICITY) 0.16 XO/2.1RM SOPN 0.75 mg weekly 12 pen 3    insulin regular human (HUMULIN R U-500 KWIKPEN) 500 UNIT/ML SOPN concentrated injection pen  units before breakfast and dinner 18 pen 1    betamethasone dipropionate (DIPROLENE) 0.05 % ointment APPLY TOPICALLY DAILY 90 g 3    verapamil (CALAN SR) 240 MG extended release tablet TAKE 1 TABLET NIGHTLY 90 tablet 3    metoprolol succinate (TOPROL XL) 50 MG extended release tablet Take 1 tablet by mouth nightly 30 tablet 3    losartan (COZAAR) 100 MG tablet TAKE 1 TABLET DAILY 90 tablet 4    montelukast (SINGULAIR) 10 MG tablet Take 1 tablet by mouth nightly 90 tablet 3    Insulin Pen Needle 32G X 4 MM MISC 1 each by Does not apply route daily 100 each 3    loratadine (CLARITIN) 10 MG tablet Take 10 mg by mouth daily      ONE TOUCH LANCETS MISC 1 each by Does not apply route daily 100 each 3     No current facility-administered medications for this visit. Review of Systems    Constitutional: Negative for weight loss and malaise/fatigue. Negative for fever and chills. HENT: Negative for hearing loss, ear pain, nosebleeds, neck pain and tinnitus. Eyes: Negative for blurred vision. Negative for double vision, photophobia and pain. Respiratory: Negative for cough and sputum production. +SOB  Cardiovascular: Negative for chest pain, palpitations and leg swelling. Gastrointestinal: Negative for nausea, vomiting and abdominal pain. Genitourinary: Negative for dysuria, urgency and +frequency. Musculoskeletal: Negative for back pain. No joint pain  Skin: Negative for itching and rash. Neurological:+ for dizziness.  Negative for tingling, tremors, focal weakness and +headaches. Endo/Heme/Allergies: see HPI  Psychiatric/Behavioral: Negative for depression and substance abuse. PHYSICAL EXAMINATION:  [ INSTRUCTIONS:  \"[x]\" Indicates a positive item  \"[]\" Indicates a negative item  -- DELETE ALL ITEMS NOT EXAMINED]  Vital Signs: (As obtained by patient/caregiver or practitioner observation)    Blood pressure-  Heart rate-    Respiratory rate-    Temperature-  Pulse oximetry-     Constitutional Appears well-developed and well-nourished No apparent distress        Mental status  Alert and awake  Oriented to person/place/time  Able to follow commands      Eyes:  EOM    [x]  Normal    Sclera  [x]  Normal           Discharge [x]  None visible      HENT:   [x] Normocephalic, atraumatic. [x] Mouth/Throat: Mucous membranes are moist.     External Ears [x] Normal  no discharge    Neck: [x] No visualized mass  no swelling    Pulmonary/Chest: [x] Respiratory effort normal.  [x] No visualized signs of difficulty breathing or respiratory distress             Musculoskeletal:   [x] Normal gait with no signs of ataxia         [x] Normal range of motion of neck          Head and neck stable, appears normal ROM, strength good    Neurological:        [x] No Facial Asymmetry (Cranial nerve 7 motor function) (limited exam to video visit)          [x] No gaze palsy                Skin:        [x] No significant exanthematous lesions or discoloration noted on facial skin                 Psychiatric:       [x] Normal Affect [x] No Hallucinations            Other pertinent observable physical exam findings-     Due to this being a TeleHealth encounter, evaluation of the following organ systems is limited: Vitals/Constitutional/EENT/Resp/CV/GI//MS/Neuro/Skin/Heme-Lymph-Imm. Services were provided through a video synchronous discussion virtually to substitute for in-person clinic visit.                5/21  Skeletal foot exam is normal, no skin lesions, toenails are normal,10 g monofilament is 10/10 on the right and 10/10 on the left  Right sole callus/thickening, no active discharge, no redness    Lab Reviewed   No components found for: CHLPL  Lab Results   Component Value Date    TRIG 54 03/14/2020    TRIG 75 01/15/2019    TRIG 60 09/21/2017     Lab Results   Component Value Date    HDL 55 03/14/2020    HDL 63 (H) 01/15/2019    HDL 66 (H) 09/21/2017     Lab Results   Component Value Date    LDLCALC 58 03/14/2020    LDLCALC 83 01/15/2019    LDLCALC 51 09/21/2017     Lab Results   Component Value Date    LABVLDL 11 03/14/2020    LABVLDL 15 01/15/2019    LABVLDL 12 09/21/2017     Lab Results   Component Value Date    LABA1C 10.5 12/31/2019       Assessment:     Brayan Britt is a 62 y.o. female with :    1.T2DM: Longstanding, uncontrolled. Discussed goals, provided education. Given her A1c and hyperglycemia,switched back to U-500 , discussed risk of lows, needs self monitoring. Advised to take insulin 30 min before the meal.  Has hyperglycemia,stressed importance of taking all three doses. Adjust dose as taking extra insulin  20-30 gm CHO with meal  2. HTN. Juan Luis Ra Aldosterone and renin nl, lab did not check MN. May need aldactone  3. HLD: LDL at goal, on statin  4. Obesity: Discussed weight loss, refer to bariatrics  5. Depression  6. MNG: Per history-h/o FNA, reports FNA was benign years ago, will need USG, did not have done  7. ADELINA  8. Vitamin D deficiency    Plan:      U-500  230/220/220   Advise to check blood sugar 4 times a day   Patient to send blood sugar log for titration, weekly   Refer to diabetes education. Advise to low simple carbohydrate and protein with each  meal diet. Diabetes Care: recommend yearly eye exam, foot exam and urine microalbumin to   creatinine ratio.       -Hyperlipidemia, LDL goal is <100 mg/dl   -Hypertension; Continue same  -Daily ASA: Added 81mg   -Smoking status: Non smoker    F/u 4-6 weeks

## 2022-05-19 ENCOUNTER — OFFICE VISIT (OUTPATIENT)
Dept: INTERNAL MEDICINE CLINIC | Age: 67
End: 2022-05-19
Payer: MEDICARE

## 2022-05-19 VITALS
DIASTOLIC BLOOD PRESSURE: 87 MMHG | SYSTOLIC BLOOD PRESSURE: 134 MMHG | WEIGHT: 293 LBS | BODY MASS INDEX: 47.09 KG/M2 | TEMPERATURE: 97 F | HEIGHT: 66 IN

## 2022-05-19 DIAGNOSIS — F32.A DEPRESSION, UNSPECIFIED DEPRESSION TYPE: ICD-10-CM

## 2022-05-19 DIAGNOSIS — I10 PRIMARY HYPERTENSION: ICD-10-CM

## 2022-05-19 DIAGNOSIS — E11.59 TYPE 2 DIABETES MELLITUS WITH OTHER CIRCULATORY COMPLICATION, WITH LONG-TERM CURRENT USE OF INSULIN (HCC): ICD-10-CM

## 2022-05-19 DIAGNOSIS — Z00.00 INITIAL MEDICARE ANNUAL WELLNESS VISIT: Primary | ICD-10-CM

## 2022-05-19 DIAGNOSIS — G47.33 OBSTRUCTIVE SLEEP APNEA SYNDROME: ICD-10-CM

## 2022-05-19 DIAGNOSIS — R06.02 SOB (SHORTNESS OF BREATH): ICD-10-CM

## 2022-05-19 DIAGNOSIS — E78.00 PURE HYPERCHOLESTEROLEMIA: ICD-10-CM

## 2022-05-19 DIAGNOSIS — E66.01 MORBID OBESITY WITH BMI OF 50.0-59.9, ADULT (HCC): ICD-10-CM

## 2022-05-19 DIAGNOSIS — Z79.4 TYPE 2 DIABETES MELLITUS WITH OTHER CIRCULATORY COMPLICATION, WITH LONG-TERM CURRENT USE OF INSULIN (HCC): ICD-10-CM

## 2022-05-19 PROBLEM — N30.00 ACUTE CYSTITIS WITHOUT HEMATURIA: Status: RESOLVED | Noted: 2021-09-27 | Resolved: 2022-05-19

## 2022-05-19 PROCEDURE — G0402 INITIAL PREVENTIVE EXAM: HCPCS | Performed by: INTERNAL MEDICINE

## 2022-05-19 PROCEDURE — 99213 OFFICE O/P EST LOW 20 MIN: CPT | Performed by: INTERNAL MEDICINE

## 2022-05-19 ASSESSMENT — PATIENT HEALTH QUESTIONNAIRE - PHQ9
10. IF YOU CHECKED OFF ANY PROBLEMS, HOW DIFFICULT HAVE THESE PROBLEMS MADE IT FOR YOU TO DO YOUR WORK, TAKE CARE OF THINGS AT HOME, OR GET ALONG WITH OTHER PEOPLE: 0
SUM OF ALL RESPONSES TO PHQ QUESTIONS 1-9: 1
SUM OF ALL RESPONSES TO PHQ QUESTIONS 1-9: 1
9. THOUGHTS THAT YOU WOULD BE BETTER OFF DEAD, OR OF HURTING YOURSELF: 0
5. POOR APPETITE OR OVEREATING: 0
8. MOVING OR SPEAKING SO SLOWLY THAT OTHER PEOPLE COULD HAVE NOTICED. OR THE OPPOSITE, BEING SO FIGETY OR RESTLESS THAT YOU HAVE BEEN MOVING AROUND A LOT MORE THAN USUAL: 0
2. FEELING DOWN, DEPRESSED OR HOPELESS: 0
7. TROUBLE CONCENTRATING ON THINGS, SUCH AS READING THE NEWSPAPER OR WATCHING TELEVISION: 0
SUM OF ALL RESPONSES TO PHQ9 QUESTIONS 1 & 2: 1
SUM OF ALL RESPONSES TO PHQ QUESTIONS 1-9: 1
SUM OF ALL RESPONSES TO PHQ QUESTIONS 1-9: 1
6. FEELING BAD ABOUT YOURSELF - OR THAT YOU ARE A FAILURE OR HAVE LET YOURSELF OR YOUR FAMILY DOWN: 0
3. TROUBLE FALLING OR STAYING ASLEEP: 0
4. FEELING TIRED OR HAVING LITTLE ENERGY: 0
1. LITTLE INTEREST OR PLEASURE IN DOING THINGS: 1

## 2022-05-19 ASSESSMENT — ENCOUNTER SYMPTOMS
BACK PAIN: 1
COLOR CHANGE: 0
ABDOMINAL PAIN: 0
CHEST TIGHTNESS: 0
WHEEZING: 0

## 2022-05-19 NOTE — PATIENT INSTRUCTIONS
Dr. Gretel Tinsley -cheng appt in august  Get shingrix and tdap at the pharmacy   Personalized Preventive Plan for Shara Jackson - 5/19/2022  Medicare offers a range of preventive health benefits. Some of the tests and screenings are paid in full while other may be subject to a deductible, co-insurance, and/or copay. Some of these benefits include a comprehensive review of your medical history including lifestyle, illnesses that may run in your family, and various assessments and screenings as appropriate. After reviewing your medical record and screening and assessments performed today your provider may have ordered immunizations, labs, imaging, and/or referrals for you. A list of these orders (if applicable) as well as your Preventive Care list are included within your After Visit Summary for your review. Other Preventive Recommendations:    · A preventive eye exam performed by an eye specialist is recommended every 1-2 years to screen for glaucoma; cataracts, macular degeneration, and other eye disorders. · A preventive dental visit is recommended every 6 months. · Try to get at least 150 minutes of exercise per week or 10,000 steps per day on a pedometer . · Order or download the FREE \"Exercise & Physical Activity: Your Everyday Guide\" from [employer]. Call 2-456.817.7424 or search [employer] online. · You need 7658-8513 mg of calcium and 6234-3789 IU of vitamin D per day. It is possible to meet your calcium requirement with diet alone, but a vitamin D supplement is usually necessary to meet this goal.  · When exposed to the sun, use a sunscreen that protects against both UVA and UVB radiation with an SPF of 30 or greater. Reapply every 2 to 3 hours or after sweating, drying off with a towel, or swimming. · Always wear a seat belt when traveling in a car. Always wear a helmet when riding a bicycle or motorcycle.

## 2022-05-19 NOTE — PROGRESS NOTES
Subjective:      Patient ID: Adrian Jeter is a 73 y.o. female. Chief Complaint   Patient presents with   [facility] [place] AWV     yearly,   feels well- weight down 50#'s- sugars still not great but better controlled and is utd w Dr. Brink Cease to watch diet- excited about new antoniaby coming soon in [place]- bp's ok at home-still has not done sleep studies      Review of Systems   Constitutional: Negative for activity change, appetite change and fatigue. HENT: Negative for congestion. Eyes: Negative for visual disturbance. Respiratory: Negative for chest tightness and wheezing. Cardiovascular: Positive for leg swelling. Negative for chest pain and palpitations. Gastrointestinal: Negative for abdominal pain. Musculoskeletal: Positive for back pain. Negative for arthralgias. Skin: Negative for color change and rash. Neurological: Negative for weakness, light-headedness and headaches. Psychiatric/Behavioral: Negative for dysphoric mood. The patient is not nervous/anxious. History reviewed. No pertinent family history.   Social History     Socioeconomic History    Marital status:      Spouse name: Not on file    Number of children: Not on file    Years of education: Not on file    Highest education level: Not on file   Occupational History    Not on file   Tobacco Use    Smoking status: Former Smoker     Packs/day: 0.50     Years: 10.00     Pack years: 5.00     Types: Cigarettes     Quit date: 1995     Years since quittin.0    Smokeless tobacco: Never Used   Vaping Use    Vaping Use: Never used   Substance and Sexual Activity    Alcohol use: Yes     Comment: once a month    Drug use: No    Sexual activity: Not on file   Other Topics Concern    Not on file   Social History Narrative    Not on file     Social Determinants of Health     Financial Resource Strain: Low Risk     Difficulty of Paying Living Expenses: Not hard at all   Food Insecurity: No Food Insecurity  Worried About Running Out of Food in the Last Year: Never true    Ai of Food in the Last Year: Never true   Transportation Needs:     Lack of Transportation (Medical): Not on file    Lack of Transportation (Non-Medical): Not on file   Physical Activity:     Days of Exercise per Week: Not on file    Minutes of Exercise per Session: Not on file   Stress:     Feeling of Stress : Not on file   Social Connections:     Frequency of Communication with Friends and Family: Not on file    Frequency of Social Gatherings with Friends and Family: Not on file    Attends Jew Services: Not on file    Active Member of [address] [place] [address] [employer] or Organizations: Not on file    Attends Club or Organization Meetings: Not on file    Marital Status: Not on file   Intimate Partner Violence:     Fear of Current or Ex-Partner: Not on file    Emotionally Abused: Not on file    Physically Abused: Not on file    Sexually Abused: Not on file   Housing Stability:     Unable to Pay for Housing in the Last Year: Not on file    Number of [place] in the Last Year: Not on file    Unstable Housing in the Last Year: Not on file         Objective:   Physical Exam  Constitutional:       Appearance: She is well-developed. She is obese. HENT:      Head: Normocephalic and atraumatic. Eyes:      Pupils: Pupils are equal, round, and reactive to light. Cardiovascular:      Rate and Rhythm: Normal rate and regular rhythm. Pulmonary:      Effort: Pulmonary effort is normal.      Breath sounds: Normal breath sounds. Skin:     General: Skin is warm and dry. Neurological:      Mental Status: She is alert and oriented to person, place, and time. Psychiatric:         Mood and Affect: Mood normal.         Behavior: Behavior normal.         Thought Content:  Thought content normal.         Judgment: Judgment normal.           Assessment and Plan:      Diabetes mellitus ([address]   Doing sl better- cont f/u w Dr. Aron Grigsby obesity with BMI of 50.0-59.9, adult ([facility])   Down 50#'s partially due to poor glucose control but to try to maintain loss    Pure hypercholesterolemia   Cont statin and follow    Depression   Controlled w current regimen- continue and monitor closely      Hypertension   Controlled w current regimen- continue and monitor closely      Obstructive sleep apnea syndrome   Still needs to get testing!!!    SOB (shortness of breath)   Sl improved w wt loss       Encounter Diagnoses   Name Primary?  Initial Medicare annual wellness visit Yes    Type 2 diabetes mellitus with other circulatory complication, with long-term current use of insulin ([facility])     Body mass index (BMI) 45.0-49.9, adult ([facility])     Morbid obesity with BMI of 50.0-59.9, adult ([facility])     Pure hypercholesterolemia     Depression, unspecified depression type     Primary hypertension     Obstructive sleep apnea syndrome     SOB (shortness of breath)        Orders Placed This Encounter   Procedures   Sara Paulino MD, Sleep Medicine, [facility]     Referral Priority:   Routine     Referral Type:   Eval and Treat     Referral Reason:   Specialty Services Required     Referred to Provider:   Cele Casey MD     Requested Specialty:   Sleep Medicine Family Practice     Number of Visits Requested:   1           Medicare Annual Wellness Visit    [facility] is here for Medicare AWV (yearly,)    Assessment & Plan    See note    Recommendations for Preventive Services Due: see orders and patient instructions/AVS.  Recommended screening schedule for the next 5-10 years is provided to the patient in written form: see Patient Instructions/AVS.     Return for Medicare Annual Wellness Visit in 1 year. Subjective   The following acute and/or chronic problems were also addressed today:  See note     Patient's complete Health Risk Assessment and screening values have been reviewed and are found in Flowsheets.  The following problems were reviewed today and where indicated follow up appointments were made and/or referrals ordered. Positive Risk Factor Screenings with Interventions:               General Health and ACP:       Advance Directives     Power of  Living Will ACP-Advance Directive ACP-Power of     Not on File Not on File Not on File Not on File      General Health Risk Interventions:  · none    Health Habits/Nutrition:     Physical Activity:     Days of Exercise per Week: Not on file    Minutes of Exercise per Session: Not on file        Body mass index: (!) 49.22       Health Habits/Nutrition Interventions:  · Nutritional issues:  cont to work on diet              Objective   Vitals:    05/19/22 1544   BP: 134/87   Temp: 97 °F (36.1 °C)   Weight: (!) 305 lb (138.3 kg)   Height: 5' 6\" (1.676 m)      Body mass index is 49.23 kg/m². See note for physical        Allergies   Allergen Reactions    Actos [Pioglitazone Hydrochloride] Nausea Only    Metformin Nausea Only    Sulfa Antibiotics Swelling     Prior to Visit Medications    Medication Sig Taking? Authorizing Provider   insulin regular human (HUMULIN R U-500 KWIKPEN) 500 UNIT/ML SOPN concentrated injection pen 220-260 units TID,  Pt will need 22 boxes  She using sliding scale Max 780 units of U-500 per day Yes Julio César Dowell MD   cyclobenzaprine (FLEXERIL) 10 MG tablet TAKE 1 TABLET BY MOUTH THREE TIMES DAILY AS NEEDED FOR MUSCLE SPASMS Yes Priscila Keller MD   Insulin Pen Needle 32G X 4 MM MISC 1 each by Does not apply route 3 times daily Yes Julio César Dowell MD   losartan (COZAAR) 100 MG tablet TAKE 1 TABLET DAILY Yes Priscila Keller MD   verapamil (CALAN SR) 240 MG extended release tablet TAKE 1 TABLET NIGHTLY Yes Priscila Keller MD   promethazine-codeine (PHENERGAN WITH CODEINE) 6.25-10 MG/5ML syrup Take 5 mLs by mouth every 4 hours as needed for Cough.  Yes Priscila Keller MD   Dulaglutide (TRULICITY) 3 LW/2.6QO SOPN Inject 3 mg into the skin once a week Yes Latasha Barksdale Hamida Bryant MD   empagliflozin (JARDIANCE) 10 MG tablet Take 1 tablet by mouth daily Yes Tyrese Aguilar MD   atorvastatin (LIPITOR) 40 MG tablet TAKE 1 TABLET DAILY Yes Jazmin Patricia MD   blood glucose test strips (TRUE METRIX BLOOD GLUCOSE TEST) strip 1 each by In Vitro route 3 times daily As needed. Yes Tyrese Aguilar MD   blood glucose test strips (EXACTECH TEST) strip 4 times daily. Yes Tyrese Aguilar MD   betamethasone dipropionate (DIPROLENE) 0.05 % ointment Apply topically 2 times daily.  Yes Jazmin Patricia MD   cloNIDine (CATAPRES) 0.1 MG tablet Take 2 tablets by mouth 2 times daily Yes Jazmin Patricia MD   Continuous Blood Gluc Sensor (FREESTYLE MING 14 DAY SENSOR) MISC 1 Units by Does not apply route every 14 days Yes CORNELIUS Amador CNP   Continuous Blood Gluc  (FREESTYLE MING 14 DAY READER) YOANDY 1 Units by Does not apply route daily Yes CORNELIUS Amador CNP   chlorthalidone (HYGROTON) 25 MG tablet TAKE 1 TABLET DAILY Yes Jazmin Patricia MD   ASPIRIN 81 PO Take by mouth Yes Historical MD Sai   furosemide (LASIX) 20 MG tablet 2 daily until edema is improved then 1 daily Yes Jazmin Patricia MD   busPIRone (BUSPAR) 15 MG tablet TAKE 1 TABLET THREE TIMES A DAY AS NEEDED FOR ANXIETY Yes Jazmin Patricia MD   FLUoxetine (PROZAC) 40 MG capsule 1 daily Yes Jazmin Patricia MD   diclofenac sodium 1 % GEL Apply 4 g topically 4 times daily Yes Jazmin Patricia MD   gabapentin (NEURONTIN) 100 MG capsule Take up to 4 caps at bedtime and 1 tid during waking hours  Jazmin Patricia MD       [facility] (Including outside providers/suppliers regularly involved in providing care):   Patient Care Team:  Jazmin Patricia MD as PCP - Willow Chicas MD as PCP - [facility]  Tyrese Aguilar MD as Consulting Physician (Endocrinology)  Anika Mcclain MD as Obstetrician (Obstetrics & Gynecology)     Reviewed and updated this visit:  Tobacco  Allergies  Meds  Problems  Med Hx  Surg Hx  Soc Hx Fam Hx

## 2022-06-06 DIAGNOSIS — E11.65 UNCONTROLLED TYPE 2 DIABETES MELLITUS WITH HYPERGLYCEMIA (HCC): ICD-10-CM

## 2022-06-07 LAB
ESTIMATED AVERAGE GLUCOSE: 306.3 MG/DL
HBA1C MFR BLD: 12.3 %

## 2022-07-25 ENCOUNTER — OFFICE VISIT (OUTPATIENT)
Dept: ENDOCRINOLOGY | Age: 67
End: 2022-07-25
Payer: MEDICARE

## 2022-07-25 VITALS
WEIGHT: 293 LBS | HEART RATE: 90 BPM | SYSTOLIC BLOOD PRESSURE: 162 MMHG | BODY MASS INDEX: 47.09 KG/M2 | DIASTOLIC BLOOD PRESSURE: 80 MMHG | OXYGEN SATURATION: 96 % | HEIGHT: 66 IN

## 2022-07-25 DIAGNOSIS — E11.65 UNCONTROLLED TYPE 2 DIABETES MELLITUS WITH HYPERGLYCEMIA (HCC): Primary | ICD-10-CM

## 2022-07-25 LAB — HBA1C MFR BLD: 12.6 %

## 2022-07-25 PROCEDURE — 83036 HEMOGLOBIN GLYCOSYLATED A1C: CPT | Performed by: INTERNAL MEDICINE

## 2022-07-25 PROCEDURE — 3046F HEMOGLOBIN A1C LEVEL >9.0%: CPT | Performed by: INTERNAL MEDICINE

## 2022-07-25 PROCEDURE — 1123F ACP DISCUSS/DSCN MKR DOCD: CPT | Performed by: INTERNAL MEDICINE

## 2022-07-25 PROCEDURE — 99214 OFFICE O/P EST MOD 30 MIN: CPT | Performed by: INTERNAL MEDICINE

## 2022-07-25 PROCEDURE — 95251 CONT GLUC MNTR ANALYSIS I&R: CPT | Performed by: INTERNAL MEDICINE

## 2022-07-25 RX ORDER — INSULIN HUMAN 500 [IU]/ML
INJECTION, SOLUTION SUBCUTANEOUS
Qty: 54 EACH | Refills: 1 | Status: SHIPPED | OUTPATIENT
Start: 2022-07-25

## 2022-07-25 RX ORDER — TIRZEPATIDE 2.5 MG/.5ML
INJECTION, SOLUTION SUBCUTANEOUS
Qty: 6 ML | Refills: 3 | Status: SHIPPED | OUTPATIENT
Start: 2022-07-25 | End: 2022-08-08

## 2022-07-25 NOTE — PROGRESS NOTES
Seen as f/u patient for diabetes      Interm: On U-500, taking higher   She has CGM  Hyperglycemia  Eating out  She had stopped trulicity as thought can not take it with jardiance    Not taking breakfast dose as does not eat      Diagnosed with Type 2 diabetes mellitus 8 years ago  Known diabetic complications: Retinopathy NPDR    Current diabetic medications   U-500  270/270/270  SSI 5 for 50 > 213  Trulicity not taking  Jardiance 10mg    Previous:    U-500 70/80/70  But taking only with lunch n dinner   SSI 5 for 84>007  Trulicity    Moderate, uncontrolled    Previous:    Lantus 70/70    But missing second shot 3 days a week   But doing 66/66  Humalog 38 units AC TID, taking it 2/week    But doing 34   SSI 2 for 50>150  She has relative hypoglycemia in the 70s    H/o use of lantus, novolog, victoza, januvia    Metformin and ER caused GI symptoms    Last A1c 12.3%<---- 9.5%<----- 9.2%<----10.5%<-----9.3%<------9.9%<-----9.2%<------10.2 on 5/17<--- 8.4 on 12/16<-----7.8 on 6/16<------ 9.6 on 2/16<---- 10.5 on 10/15<-----11.8 on 8/15<---14.2 <--- 10.6 <--- 9.4    Prior visit with dietician: Yes   Current diet: on average, 3 meals per day does not follow low CHO  Current exercise: walking   Current monitoring regimen: home blood tests  She uses CGM  Has brought blood glucose log/meter:   Home blood sugar records:   Average 326    Fasting and post meal highs    Any episodes of hypoglycemia? 44    No Hx of CAD , PVD, CVA    Hyperlipidemia:controlled on statin LDL 51 on 9/17  Simvastatin 40mg  LDL 58 on 3/20  LDL 49 on 11/21  Last eye exam: 2/20  Last foot exam: 12/21  Last microalbumin to creatinine ratio:3/22    HTN: On multiple medications, taking toprol 50mg?   Losartan 100mg verapamil 240mg Chlorthalidone 25mg clonidine 0.1mg TID  Uncontrolled    She has thyroid nodules, not on medication    Past Medical History:   Diagnosis Date    Anxiety and depression     Asthma     Edema     GERD (gastroesophageal reflux disease)     Hyperlipidemia     Hypertension     IBS (irritable bowel syndrome)     Irregular menstrual bleeding 2011    Dr. Charo Shahid     Morbid obesity [facility])     Multinodular goiter 2010    Personal history of colonic polyps     Dr. Louie Parkinson- due for repeat colonoscopy     Positive PPD, treated     INH completed 2001    S/P cholecystectomy 2011    Type II or unspecified type diabetes mellitus without mention of complication, not stated as uncontrolled      Past Surgical History:   Procedure Laterality Date     SECTION N/A 30 plus years ago    x2    CHOLECYSTECTOMY      3/2009    COLONOSCOPY      2012 Dr. Hannah Speaker 5 yrs    COLONOSCOPY      repeat x 3 yrs HAD 5 POLPYS    HYSTEROSCOPY  2015    HYSTEROSCOPY, DILATATION AND CURETTAGE WITH NOVASURE ABLATION AND ABLATION    MYOMECTOMY      2003    TIBIA FRACTURE SURGERY      2006 w/ revision     Current Outpatient Medications   Medication Sig Dispense Refill    cloNIDine (CATAPRES) 0.1 MG tablet Take 1 tablet by mouth every 8 hours 270 tablet 3    diclofenac sodium 1 % GEL Apply 4 g topically 4 times daily 3 Tube 5    buPROPion (WELLBUTRIN XL) 300 MG extended release tablet Take 1 tablet by mouth every morning 90 tablet 3    chlorthalidone (HYGROTON) 25 MG tablet Take 1 tablet by mouth daily 30 tablet 3    busPIRone (BUSPAR) 15 MG tablet Take 15 mg by mouth 3 times daily as needed (anxiety) 90 tablet 3    atorvastatin (LIPITOR) 40 MG tablet TAKE 1 TABLET DAILY 90 tablet 3    FLUoxetine (PROZAC) 40 MG capsule TAKE 1 CAPSULE DAILY 90 capsule 3    Dulaglutide (TRULICITY) 3.17 AK/7.1LC SOPN 0.75 mg weekly 12 pen 3    insulin regular human (HUMULIN R U-500 KWIKPEN) 500 UNIT/ML SOPN concentrated injection pen  units before breakfast and dinner 18 pen 1    betamethasone dipropionate (DIPROLENE) 0.05 % ointment APPLY TOPICALLY DAILY 90 g 3    verapamil (CALAN SR) 240 MG extended release tablet TAKE 1 TABLET NIGHTLY 90 tablet 3    metoprolol succinate (TOPROL XL) 50 MG extended release tablet Take 1 tablet by mouth nightly 30 tablet 3    losartan (COZAAR) 100 MG tablet TAKE 1 TABLET DAILY 90 tablet 4    montelukast (SINGULAIR) 10 MG tablet Take 1 tablet by mouth nightly 90 tablet 3    Insulin Pen Needle 32G X 4 MM MISC 1 each by Does not apply route daily 100 each 3    loratadine (CLARITIN) 10 MG tablet Take 10 mg by mouth daily      ONE TOUCH LANCETS MISC 1 each by Does not apply route daily 100 each 3     No current facility-administered medications for this visit. Review of Systems    Scanned, reviewed    Vitals:    07/25/22 1415   BP: (!) 162/80   Pulse: 90   SpO2: 96%       Constitutional: Well-developed, appears stated age, cooperative, in no acute distress  H/E/N/M/T:atraumatic, normocephalic, external ears, nose, lips normal without lesions  Eyes: Lids, lashes, conjunctivae and sclerae normal, No proptosis, no redness  Neck: supple, symmetrical, no swelling  Skin: No obvious rashes or lesions present.   Skin and hair texture normal  Psychiatric: Judgement and Insight:  judgement and insight appear normal  Neuro: Normal without focal findings, speech is normal normal, speech is spontaneous  Chest: No labored breathing, no chest deformity, no stridor  Musculoskeletal: No joint deformity, swelling          5/21  Skeletal foot exam is normal, no skin lesions, toenails are normal,10 g monofilament is 10/10 on the right and 10/10 on the left  Right sole callus/thickening, no active discharge, no redness    Lab Reviewed   No components found for: CHLPL  Lab Results   Component Value Date    TRIG 54 03/14/2020    TRIG 75 01/15/2019    TRIG 60 09/21/2017     Lab Results   Component Value Date    HDL 55 03/14/2020    HDL 63 (H) 01/15/2019    HDL 66 (H) 09/21/2017     Lab Results   Component Value Date    LDLCALC 58 03/14/2020    LDLCALC 83 01/15/2019    LDLCALC 51 09/21/2017     Lab Results   Component Value Date    LABVLDL 11 03/14/2020    LABVLDL 15 01/15/2019    LABVLDL 12 09/21/2017     Lab Results   Component Value Date    LABA1C 10.5 12/31/2019       Assessment:     Mcihael Mcdaniel is a 62 y.o. female with :    1.T2DM: Longstanding, uncontrolled. Discussed goals, provided education. Given her A1c and hyperglycemia,switched back to U-500 , discussed risk of lows, needs self monitoring. Advised to take insulin 30 min before the meal.  Has hyperglycemia,stressed importance of taking all three doses. Adjust dose as taking extra insulin  20-30 gm CHO with meal  Start mounjaro as not taking trulicity. If not covered, will plan for ozempic  2. HTN. Dominique Ramos Aldosterone and renin nl, lab did not check MN. May need aldactone  3. HLD: LDL at goal, on statin  4. Obesity: Discussed weight loss, refer to bariatrics  5. Depression  6. MNG: Per history-h/o FNA, reports FNA was benign years ago, will need USG, did not have done  7. ADELINA  8. Vitamin D deficiency    Plan:      U-500  300 units before meals   SSI   Add Mounjaro   Advised to rotate site   Advise to check blood sugar 4 times a day   Patient to send blood sugar log for titration, weekly   Refer to diabetes education. Advise to low simple carbohydrate and protein with each  meal diet. Diabetes Care: recommend yearly eye exam, foot exam and urine microalbumin to   creatinine ratio.       -Hyperlipidemia, LDL goal is <100 mg/dl   -Hypertension; Continue same  -Daily ASA: Added 81mg   -Smoking status: Non smoker

## 2022-08-04 ENCOUNTER — PATIENT MESSAGE (OUTPATIENT)
Dept: ENDOCRINOLOGY | Age: 67
End: 2022-08-04

## 2022-08-04 NOTE — TELEPHONE ENCOUNTER
From: Amadou Abdul  To: Dr. Juan José Claros  Sent: 8/4/2022 11:59 AM EDT  Subject: Sending [employer] Numbers    Hi! Can I get instructions on how I can send my numbers from my [employer] to you practice. I have signed up with you. Thank you!

## 2022-08-08 RX ORDER — SEMAGLUTIDE 1.34 MG/ML
INJECTION, SOLUTION SUBCUTANEOUS
Qty: 6 PEN | Refills: 3 | Status: SHIPPED | OUTPATIENT
Start: 2022-08-08

## 2022-09-16 ENCOUNTER — OFFICE VISIT (OUTPATIENT)
Dept: INTERNAL MEDICINE CLINIC | Age: 67
End: 2022-09-16
Payer: MEDICARE

## 2022-09-16 VITALS
SYSTOLIC BLOOD PRESSURE: 144 MMHG | BODY MASS INDEX: 47.09 KG/M2 | WEIGHT: 293 LBS | TEMPERATURE: 97 F | DIASTOLIC BLOOD PRESSURE: 82 MMHG | HEART RATE: 76 BPM | HEIGHT: 66 IN | OXYGEN SATURATION: 94 %

## 2022-09-16 DIAGNOSIS — I10 PRIMARY HYPERTENSION: ICD-10-CM

## 2022-09-16 DIAGNOSIS — E11.59 TYPE 2 DIABETES MELLITUS WITH OTHER CIRCULATORY COMPLICATION, WITH LONG-TERM CURRENT USE OF INSULIN (HCC): Primary | ICD-10-CM

## 2022-09-16 DIAGNOSIS — G47.33 OBSTRUCTIVE SLEEP APNEA SYNDROME: ICD-10-CM

## 2022-09-16 DIAGNOSIS — Z79.4 TYPE 2 DIABETES MELLITUS WITH OTHER CIRCULATORY COMPLICATION, WITH LONG-TERM CURRENT USE OF INSULIN (HCC): Primary | ICD-10-CM

## 2022-09-16 DIAGNOSIS — E78.00 PURE HYPERCHOLESTEROLEMIA: ICD-10-CM

## 2022-09-16 DIAGNOSIS — G62.9 PERIPHERAL POLYNEUROPATHY: ICD-10-CM

## 2022-09-16 DIAGNOSIS — E04.2 MULTIPLE THYROID NODULES: ICD-10-CM

## 2022-09-16 DIAGNOSIS — E66.01 MORBID OBESITY WITH BMI OF 50.0-59.9, ADULT (HCC): ICD-10-CM

## 2022-09-16 PROCEDURE — 3046F HEMOGLOBIN A1C LEVEL >9.0%: CPT | Performed by: STUDENT IN AN ORGANIZED HEALTH CARE EDUCATION/TRAINING PROGRAM

## 2022-09-16 PROCEDURE — 3288F FALL RISK ASSESSMENT DOCD: CPT | Performed by: STUDENT IN AN ORGANIZED HEALTH CARE EDUCATION/TRAINING PROGRAM

## 2022-09-16 PROCEDURE — 99214 OFFICE O/P EST MOD 30 MIN: CPT | Performed by: STUDENT IN AN ORGANIZED HEALTH CARE EDUCATION/TRAINING PROGRAM

## 2022-09-16 PROCEDURE — 1123F ACP DISCUSS/DSCN MKR DOCD: CPT | Performed by: STUDENT IN AN ORGANIZED HEALTH CARE EDUCATION/TRAINING PROGRAM

## 2022-09-16 SDOH — ECONOMIC STABILITY: FOOD INSECURITY: WITHIN THE PAST 12 MONTHS, THE FOOD YOU BOUGHT JUST DIDN'T LAST AND YOU DIDN'T HAVE MONEY TO GET MORE.: NEVER TRUE

## 2022-09-16 SDOH — HEALTH STABILITY: PHYSICAL HEALTH: ON AVERAGE, HOW MANY MINUTES DO YOU ENGAGE IN EXERCISE AT THIS LEVEL?: 10 MIN

## 2022-09-16 SDOH — ECONOMIC STABILITY: FOOD INSECURITY: WITHIN THE PAST 12 MONTHS, YOU WORRIED THAT YOUR FOOD WOULD RUN OUT BEFORE YOU GOT MONEY TO BUY MORE.: NEVER TRUE

## 2022-09-16 SDOH — HEALTH STABILITY: PHYSICAL HEALTH: ON AVERAGE, HOW MANY DAYS PER WEEK DO YOU ENGAGE IN MODERATE TO STRENUOUS EXERCISE (LIKE A BRISK WALK)?: 0 DAYS

## 2022-09-16 ASSESSMENT — PATIENT HEALTH QUESTIONNAIRE - PHQ9
10. IF YOU CHECKED OFF ANY PROBLEMS, HOW DIFFICULT HAVE THESE PROBLEMS MADE IT FOR YOU TO DO YOUR WORK, TAKE CARE OF THINGS AT HOME, OR GET ALONG WITH OTHER PEOPLE: 0
6. FEELING BAD ABOUT YOURSELF - OR THAT YOU ARE A FAILURE OR HAVE LET YOURSELF OR YOUR FAMILY DOWN: 0
1. LITTLE INTEREST OR PLEASURE IN DOING THINGS: 1
SUM OF ALL RESPONSES TO PHQ QUESTIONS 1-9: 1
4. FEELING TIRED OR HAVING LITTLE ENERGY: 0
3. TROUBLE FALLING OR STAYING ASLEEP: 0
SUM OF ALL RESPONSES TO PHQ QUESTIONS 1-9: 0
SUM OF ALL RESPONSES TO PHQ QUESTIONS 1-9: 1
7. TROUBLE CONCENTRATING ON THINGS, SUCH AS READING THE NEWSPAPER OR WATCHING TELEVISION: 0
5. POOR APPETITE OR OVEREATING: 0
SUM OF ALL RESPONSES TO PHQ QUESTIONS 1-9: 0
SUM OF ALL RESPONSES TO PHQ QUESTIONS 1-9: 0
2. FEELING DOWN, DEPRESSED OR HOPELESS: 0
SUM OF ALL RESPONSES TO PHQ9 QUESTIONS 1 & 2: 1
SUM OF ALL RESPONSES TO PHQ QUESTIONS 1-9: 1
SUM OF ALL RESPONSES TO PHQ QUESTIONS 1-9: 1
8. MOVING OR SPEAKING SO SLOWLY THAT OTHER PEOPLE COULD HAVE NOTICED. OR THE OPPOSITE, BEING SO FIGETY OR RESTLESS THAT YOU HAVE BEEN MOVING AROUND A LOT MORE THAN USUAL: 0
SUM OF ALL RESPONSES TO PHQ QUESTIONS 1-9: 0

## 2022-09-16 ASSESSMENT — ENCOUNTER SYMPTOMS
CHOKING: 0
STRIDOR: 0
NAUSEA: 0
COUGH: 0
TROUBLE SWALLOWING: 0
DIARRHEA: 0
CHEST TIGHTNESS: 0
CONSTIPATION: 0
APNEA: 0
VOICE CHANGE: 0
SHORTNESS OF BREATH: 0
PHOTOPHOBIA: 0
ABDOMINAL DISTENTION: 0
WHEEZING: 0
VOMITING: 0
ABDOMINAL PAIN: 0

## 2022-09-16 ASSESSMENT — SOCIAL DETERMINANTS OF HEALTH (SDOH)
WITHIN THE LAST YEAR, HAVE YOU BEEN HUMILIATED OR EMOTIONALLY ABUSED IN OTHER WAYS BY YOUR PARTNER OR EX-PARTNER?: NO
HOW HARD IS IT FOR YOU TO PAY FOR THE VERY BASICS LIKE FOOD, HOUSING, MEDICAL CARE, AND HEATING?: NOT HARD AT ALL
WITHIN THE LAST YEAR, HAVE YOU BEEN AFRAID OF YOUR PARTNER OR EX-PARTNER?: NO
WITHIN THE LAST YEAR, HAVE TO BEEN RAPED OR FORCED TO HAVE ANY KIND OF SEXUAL ACTIVITY BY YOUR PARTNER OR EX-PARTNER?: NO
WITHIN THE LAST YEAR, HAVE YOU BEEN KICKED, HIT, SLAPPED, OR OTHERWISE PHYSICALLY HURT BY YOUR PARTNER OR EX-PARTNER?: NO

## 2022-09-16 NOTE — ASSESSMENT & PLAN NOTE
Lose about 50 pounds in the last year  Discussed at length about her obesity as it complicates her medical problems and management. Refer patient to our in-house dietitian  Discussed healthy diet, exercise. Requested patient to do at least 10,000 steps per day and exercise about 150minutes/week.

## 2022-09-16 NOTE — PROGRESS NOTES
[facility] Internal Medicine  Preventive medicine/ physical exam visit   2022    Gama Zepeda (:  1955) marnie 78 y.o. female, here to establish care. Patient Active Problem List   Diagnosis    Diabetes mellitus ([address]    Edema    IBS (irritable bowel syndrome)    Hypertension    Allergic rhinitis    Obstructive sleep apnea syndrome    Morbid obesity with BMI of 50.0-59.9, adult ([facility])    Fibroid    Colon polyps    Depression    Anxiety    GERD (gastroesophageal reflux disease)    Vitamin D deficiency    Insomnia    Urinary incontinence    Chronic bilateral low back pain without sciatica    Venous stasis dermatitis of both lower extremities    Multiple thyroid nodules    Pure hypercholesterolemia    Chronic pain of both knees    Bilateral ankle pain    Urinary frequency    Bilateral hand numbness    Plantar fasciitis    Right knee injury    SOB (shortness of breath)    Chronic midline thoracic back pain    Peripheral polyneuropathy       ASSESSMENT/ PLAN:  Morbid obesity with BMI of 50.0-59.9, adult ([address]  Lose about 50 pounds in the last year  Discussed at length about her obesity as it complicates her medical problems and management. Refer patient to our in-house dietitian  Discussed healthy diet, exercise. Requested patient to do at least 10,000 steps per day and exercise about 150minutes/week. Hypertension  On multiple medications  Fairly controlled  Continue with current regimen for now      Obstructive sleep apnea syndrome  Will need to get test.  With patient that her weight improved her symptoms will improve    Multiple thyroid nodules  Monitor  Asymptomatic    Pure hypercholesterolemia  Continue with high intensity statin    Peripheral polyneuropathy  Secondary to uncontrolled diabetes  Discussed better blood sugar control        No orders of the defined types were placed in this encounter. No orders of the defined types were placed in this encounter.      There are no discontinued medications. No follow-ups on file. HPI    The patient presented today to establish care. She is is a former patient of Dr. Tereso Lynn, who is retired. Report that she is doing well since the last time she see Dr. Tereso Lynn at the office in May 2022. She saw Dr. Godinez Prior recently for diabetes, and her Trulicity was switched to Ozempic due to insurance. Reports that for the past month or so she started having tingling sensation on her hands and feet. She denies any burning sensation, no injury or trauma. She also noted tripped twice during her trip to [facility] to visit her children and grand children. Both of these times she felt like she was tripping on her shoes. The second time she did have slight lightheadedness. She attributed to different oxygen level in [facility]. She did not have any chest pain, shortness of breath or palpitation during this episode she did not have any other prodromal syndromes. She did not lose any consciousness and did not injure herself. She is otherwise compliant with her medications. No other acute complaint. She works at the  at [employer].     HISTORIES  Current Outpatient Medications on File Prior to Visit   Medication Sig Dispense Refill    Semaglutide,0.25 or 0.5MG/DOS, (OZEMPIC, 0.25 OR 0.5 MG/DOSE,) 2 MG/1.5ML SOPN 0.25mg SC weekly for 4 weeks and then 0.5mg SC weekly 6 pen 3    insulin regular human (HUMULIN R U-500 KWIKPEN) 500 UNIT/ML SOPN concentrated injection pen 300-350 units TID,  Pt will need 27 boxes  She using sliding scale Max 1150 units of U-500 per day 54 each 1    cyclobenzaprine (FLEXERIL) 10 MG tablet TAKE 1 TABLET BY MOUTH THREE TIMES DAILY AS NEEDED FOR MUSCLE SPASMS 30 tablet 5    Insulin Pen Needle 32G X 4 MM MISC 1 each by Does not apply route 3 times daily 100 each 3    losartan (COZAAR) 100 MG tablet TAKE 1 TABLET DAILY 90 tablet 3    verapamil (CALAN SR) 240 MG extended release tablet TAKE 1 TABLET NIGHTLY 90 tablet 3 promethazine-codeine (PHENERGAN WITH CODEINE) 6.25-10 MG/5ML syrup Take 5 mLs by mouth every 4 hours as needed for Cough. (Patient not taking: Reported on 7/25/2022) 240 mL 1    Dulaglutide (TRULICITY) 3 QL/3.3JK SOPN Inject 3 mg into the skin once a week (Patient not taking: Reported on 7/25/2022) 13 pen 2    empagliflozin (JARDIANCE) 10 MG tablet Take 1 tablet by mouth daily 90 tablet 1    atorvastatin (LIPITOR) 40 MG tablet TAKE 1 TABLET DAILY 90 tablet 3    blood glucose test strips (TRUE METRIX BLOOD GLUCOSE TEST) strip 1 each by In Vitro route 3 times daily As needed. 300 each 3    blood glucose test strips (EXACTECH TEST) strip 4 times daily. 100 strip 6    betamethasone dipropionate (DIPROLENE) 0.05 % ointment Apply topically 2 times daily. 90 g 3    cloNIDine (CATAPRES) 0.1 MG tablet Take 2 tablets by mouth 2 times daily 360 tablet 3    Continuous Blood Gluc Sensor (FREESTYLE MING 14 DAY SENSOR) MISC 1 Units by Does not apply route every 14 days 2 each 5    Continuous Blood Gluc  (FREESTYLE MING 14 DAY READER) YOANDY 1 Units by Does not apply route daily 1 each 0    chlorthalidone (HYGROTON) 25 MG tablet TAKE 1 TABLET DAILY 30 tablet 11    ASPIRIN 81 PO Take by mouth      furosemide (LASIX) 20 MG tablet 2 daily until edema is improved then 1 daily 60 tablet 3    busPIRone (BUSPAR) 15 MG tablet TAKE 1 TABLET THREE TIMES A DAY AS NEEDED FOR ANXIETY 90 tablet 11    FLUoxetine (PROZAC) 40 MG capsule 1 daily 90 capsule 3    gabapentin (NEURONTIN) 100 MG capsule Take up to 4 caps at bedtime and 1 tid during waking hours 630 capsule 3    diclofenac sodium 1 % GEL Apply 4 g topically 4 times daily 3 Tube 5     No current facility-administered medications on file prior to visit.         Allergies   Allergen Reactions    Actos [Pioglitazone Hydrochloride] Nausea Only    Metformin Nausea Only    Sulfa Antibiotics Swelling     Past Medical History:   Diagnosis Date    Anxiety and depression     Asthma     Edema GERD (gastroesophageal reflux disease)     Hyperlipidemia     Hypertension     IBS (irritable bowel syndrome)     Irregular menstrual bleeding 2011    Dr. Pulido Self     Morbid obesity [facility])     Multinodular goiter 2010    Personal history of colonic polyps     Dr. Fidencio Villalobos- due for repeat colonoscopy     Positive PPD, treated     INH completed 2001    S/P cholecystectomy 2011    Type II or unspecified type diabetes mellitus without mention of complication, not stated as uncontrolled      Patient Active Problem List   Diagnosis    Diabetes mellitus ([facility]    Edema    IBS (irritable bowel syndrome)    Hypertension    Allergic rhinitis    Obstructive sleep apnea syndrome    Morbid obesity with BMI of 50.0-59.9, adult ([facility]    Fibroid    Colon polyps    Depression    Anxiety    GERD (gastroesophageal reflux disease)    Vitamin D deficiency    Insomnia    Urinary incontinence    Chronic bilateral low back pain without sciatica    Venous stasis dermatitis of both lower extremities    Multiple thyroid nodules    Pure hypercholesterolemia    Chronic pain of both knees    Bilateral ankle pain    Urinary frequency    Bilateral hand numbness    Plantar fasciitis    Right knee injury    SOB (shortness of breath)    Chronic midline thoracic back pain    Peripheral polyneuropathy     Past Surgical History:   Procedure Laterality Date     SECTION N/A 30 plus years ago    x2    CHOLECYSTECTOMY      3/2009    COLONOSCOPY      2012 Dr. Jasmyne Duran 5 yrs    COLONOSCOPY      repeat x 3 yrs HAD 5 POLPYS    HYSTEROSCOPY  2015    HYSTEROSCOPY, DILATATION AND CURETTAGE WITH NOVASURE ABLATION AND ABLATION    MYOMECTOMY      2003    TIBIA FRACTURE SURGERY      2006 w/ revision     OB History          4    Para   4    Term   2            AB        Living             SAB        IAB        Ectopic        Molar        Multiple        Live Births                  Social History Socioeconomic History    Marital status:      Spouse name: Not on file    Number of children: Not on file    Years of education: Not on file    Highest education level: Not on file   Occupational History    Not on file   Tobacco Use    Smoking status: Former     Packs/day: 0.50     Years: 10.00     Pack years: 5.00     Types: Cigarettes     Quit date: 1995     Years since quittin.3    Smokeless tobacco: Never   Vaping Use    Vaping Use: Never used   Substance and Sexual Activity    Alcohol use: Yes     Comment: once a month    Drug use: No    Sexual activity: Not on file   Other Topics Concern    Not on file   Social History Narrative    Not on file     Social Determinants of Health     Financial Resource Strain: Low Risk     Difficulty of Paying Living Expenses: Not hard at all   Food Insecurity: No Food Insecurity    Worried About Running Out of Food in the Last Year: Never true    [address] in the Last Year: Never true   Transportation Needs: Not on file   Physical Activity: Inactive    Days of Exercise per Week: 0 days    Minutes of Exercise per Session: 10 min   Stress: Not on file   Social Connections: Not on file   Intimate Partner Violence: Not At Risk    Fear of Current or Ex-Partner: No    Emotionally Abused: No    Physically Abused: No    Sexually Abused: No   Housing Stability: Not on file      Family History   Problem Relation Age of Onset    Cancer Mother     Cancer Father              ROS  Review of Systems   Constitutional:  Negative for activity change, appetite change, chills, diaphoresis, fatigue, fever and unexpected weight change. HENT:  Negative for trouble swallowing and voice change. Eyes:  Negative for photophobia and visual disturbance. Respiratory:  Negative for apnea, cough, choking, chest tightness, shortness of breath, wheezing and stridor. Cardiovascular:  Negative for chest pain, palpitations and leg swelling.    Gastrointestinal:  Negative for abdominal distention, abdominal pain, constipation, diarrhea, nausea and vomiting. Genitourinary:  Negative for difficulty urinating and dysuria. Skin:  Negative for rash and wound. Neurological:  Negative for dizziness, weakness and light-headedness. Psychiatric/Behavioral:  Negative for agitation and behavioral problems. PE  Vitals:    09/16/22 1536 09/16/22 1539   BP: (!) 144/80 (!) 144/82   Pulse: 76    Temp: 97 °F (36.1 °C)    SpO2: 94%    Weight: 296 lb (134.3 kg)    Height: 5' 6\" (1.676 m)      Estimated body mass index is 47.78 kg/m² as calculated from the following:    Height as of this encounter: 5' 6\" (1.676 m). Weight as of this encounter: 296 lb (134.3 kg). Physical Exam  Vitals and nursing note reviewed. Constitutional:       General: She is not in acute distress. Appearance: She is well-developed. She is obese. She is not ill-appearing, toxic-appearing or diaphoretic. HENT:      Head: Normocephalic and atraumatic. Eyes:      General: No scleral icterus. Conjunctiva/sclera: Conjunctivae normal.      Pupils: Pupils are equal, round, and reactive to light. Cardiovascular:      Rate and Rhythm: Normal rate and regular rhythm. Pulses: Normal pulses. Heart sounds: Normal heart sounds. No murmur heard. No friction rub. No gallop. Pulmonary:      Effort: Pulmonary effort is normal. No respiratory distress. Breath sounds: Normal breath sounds. No wheezing or rales. Chest:      Chest wall: No tenderness. Abdominal:      General: Bowel sounds are normal. There is no distension. Palpations: Abdomen is soft. Tenderness: There is no abdominal tenderness. There is no guarding. Musculoskeletal:         General: No tenderness or deformity. Normal range of motion. Cervical back: Normal range of motion and neck supple. Skin:     General: Skin is warm and dry.    Neurological:      Mental Status: She is alert and oriented to person, place, and time.      Cranial Nerves: No cranial nerve deficit. Sensory: No sensory deficit. Psychiatric:         Behavior: Behavior normal.       The ASCVD Risk score (Kang Joshi., et al., 2013) failed to calculate for the following reasons:     The valid total cholesterol range is 130 to 320 mg/dL    Immunization History   Administered Date(s) Administered    COVID-19, PFIZER PURPLE top, DILUTE for use, (age 15 y+), 30mcg/0.3mL 11/18/2021    DTP 07/07/1999    INFLUENZA, INTRADERMAL, QUADRIVALENT, PRESERVATIVE FREE 12/08/2016, 11/29/2017    Influenza Virus Vaccine 09/26/2015    Influenza, AFLURIA (age 1 yrs+), FLUZONE, (age 10 mo+), MDV, 0.5mL 10/22/2020    Influenza, FLUAD, (age 72 y+), Adjuvanted, 0.5mL 11/04/2021    Influenza, FLUARIX, FLULAVAL, FLUZONE (age 10 mo+) AND AFLURIA, (age 1 y+), PF, 0.5mL 09/29/2018, 10/24/2019    Pneumococcal Conjugate 13-valent ([id number]) 09/26/2015    Pneumococcal Conjugate 7-valent (Prevnar7) 06/05/2007    Pneumococcal Polysaccharide ([id number]) 06/05/2007, 09/29/2018    Tdap (Boostrix, Adacel) 06/04/2011    Tetanus 07/07/1999       Health Maintenance   Topic Date Due    Shingles vaccine (1 of 2) Never done    Diabetic retinal exam  02/21/2021    DTaP/Tdap/Td vaccine (3 - Td or Tdap) 06/04/2021    COVID-19 Vaccine (2 - Pfizer series) 12/09/2021    Diabetic foot exam  05/04/2022    Flu vaccine (1) 09/01/2022    A1C test (Diabetic or Prediabetic)  10/25/2022    Lipids  11/30/2022    Diabetic microalbuminuria test  03/25/2023    Depression Monitoring  05/19/2023    Annual Wellness Visit (AWV)  05/20/2023    Pneumococcal 65+ years Vaccine (3 - PPSV23 or PCV20) 09/29/2023    Breast cancer screen  02/14/2024    Colorectal Cancer Screen  04/10/2027    DEXA (modify frequency per FRAX score)  Completed    Hepatitis C screen  Completed    Hepatitis A vaccine  Aged Out    Hib vaccine  Aged Out    Meningococcal (ACWY) vaccine  Aged Out       Baldemar Brand MD

## 2022-09-16 NOTE — ASSESSMENT & PLAN NOTE
Uncontrolled  Last A1c is more than 12%  See Dr. Suly Malin  Discussed continue blood glucose monitor  Noted recently Trulicity has changed to Ozempic due to insurance  Continue with current regimen  Will continue to follow-up

## 2022-10-03 RX ORDER — CHLORTHALIDONE 25 MG/1
25 TABLET ORAL DAILY
Qty: 90 TABLET | Refills: 1 | Status: SHIPPED | OUTPATIENT
Start: 2022-10-03

## 2022-10-04 ENCOUNTER — OFFICE VISIT (OUTPATIENT)
Dept: INTERNAL MEDICINE CLINIC | Age: 67
End: 2022-10-04
Payer: MEDICARE

## 2022-10-04 VITALS
DIASTOLIC BLOOD PRESSURE: 86 MMHG | SYSTOLIC BLOOD PRESSURE: 132 MMHG | HEIGHT: 66 IN | HEART RATE: 82 BPM | WEIGHT: 293 LBS | BODY MASS INDEX: 47.09 KG/M2 | TEMPERATURE: 98.1 F | OXYGEN SATURATION: 99 %

## 2022-10-04 DIAGNOSIS — I10 PRIMARY HYPERTENSION: ICD-10-CM

## 2022-10-04 DIAGNOSIS — E04.2 MULTIPLE THYROID NODULES: ICD-10-CM

## 2022-10-04 DIAGNOSIS — M25.551 RIGHT HIP PAIN: Primary | ICD-10-CM

## 2022-10-04 PROCEDURE — 99213 OFFICE O/P EST LOW 20 MIN: CPT | Performed by: STUDENT IN AN ORGANIZED HEALTH CARE EDUCATION/TRAINING PROGRAM

## 2022-10-04 PROCEDURE — 1123F ACP DISCUSS/DSCN MKR DOCD: CPT | Performed by: STUDENT IN AN ORGANIZED HEALTH CARE EDUCATION/TRAINING PROGRAM

## 2022-10-04 RX ORDER — CLONIDINE HYDROCHLORIDE 0.1 MG/1
0.2 TABLET ORAL 2 TIMES DAILY
Qty: 360 TABLET | Refills: 0 | Status: SHIPPED | OUTPATIENT
Start: 2022-10-04 | End: 2023-01-02

## 2022-10-04 ASSESSMENT — ENCOUNTER SYMPTOMS
CHOKING: 0
DIARRHEA: 0
ABDOMINAL PAIN: 0
VOMITING: 0
STRIDOR: 0
CONSTIPATION: 0
VOICE CHANGE: 0
APNEA: 0
CHEST TIGHTNESS: 0
WHEEZING: 0
NAUSEA: 0
TROUBLE SWALLOWING: 0
COUGH: 0
SHORTNESS OF BREATH: 0
ABDOMINAL DISTENTION: 0
PHOTOPHOBIA: 0

## 2022-10-04 ASSESSMENT — PATIENT HEALTH QUESTIONNAIRE - PHQ9
9. THOUGHTS THAT YOU WOULD BE BETTER OFF DEAD, OR OF HURTING YOURSELF: 0
SUM OF ALL RESPONSES TO PHQ QUESTIONS 1-9: 2
SUM OF ALL RESPONSES TO PHQ QUESTIONS 1-9: 2
SUM OF ALL RESPONSES TO PHQ9 QUESTIONS 1 & 2: 1
3. TROUBLE FALLING OR STAYING ASLEEP: 0
8. MOVING OR SPEAKING SO SLOWLY THAT OTHER PEOPLE COULD HAVE NOTICED. OR THE OPPOSITE, BEING SO FIGETY OR RESTLESS THAT YOU HAVE BEEN MOVING AROUND A LOT MORE THAN USUAL: 0
4. FEELING TIRED OR HAVING LITTLE ENERGY: 1
6. FEELING BAD ABOUT YOURSELF - OR THAT YOU ARE A FAILURE OR HAVE LET YOURSELF OR YOUR FAMILY DOWN: 0
1. LITTLE INTEREST OR PLEASURE IN DOING THINGS: 1
10. IF YOU CHECKED OFF ANY PROBLEMS, HOW DIFFICULT HAVE THESE PROBLEMS MADE IT FOR YOU TO DO YOUR WORK, TAKE CARE OF THINGS AT HOME, OR GET ALONG WITH OTHER PEOPLE: 0
7. TROUBLE CONCENTRATING ON THINGS, SUCH AS READING THE NEWSPAPER OR WATCHING TELEVISION: 0
SUM OF ALL RESPONSES TO PHQ QUESTIONS 1-9: 2
2. FEELING DOWN, DEPRESSED OR HOPELESS: 0
SUM OF ALL RESPONSES TO PHQ QUESTIONS 1-9: 2
5. POOR APPETITE OR OVEREATING: 0

## 2022-10-04 NOTE — PROGRESS NOTES
[facility] Internal Medicine  Acute visit   10/4/2022    Hellen Chavez (:  1955) is a 78 y.o. female, here for evaluation of the following medical concerns:    Chief Complaint   Patient presents with    Hip Pain     Right hip pain x 1wk ,was picking up heavy boxes also (L) Shoulder        ASSESSMENT/ PLAN:  Right hip pain  This is a chronic issue  Recently exacerbated after she left heavy [employer] boxes  Discussed with patient conservative treatment including ice rest, ibuprofen as needed         Orders Placed This Encounter   Medications    cloNIDine (CATAPRES) 0.1 MG tablet     Sig: Take 2 tablets by mouth 2 times daily     Dispense:  360 tablet     Refill:  0      Medications Discontinued During This Encounter   Medication Reason    cloNIDine (CATAPRES) 0.1 MG tablet REORDER        No follow-ups on file. HPI  Patient presented today with complaint of right hip pain and left shoulder pain for more than a week. She does report that this pain has been chronic however recently she did lift some heavy [employer]. The pain aggravated with movement. She has tried some Salonpas at home and noticed mild improvement. Coming today to get checked. Denies any gait issues, weakness, any apparent injury or trauma.     HISTORIES  Current Outpatient Medications on File Prior to Visit   Medication Sig Dispense Refill    chlorthalidone (HYGROTON) 25 MG tablet Take 1 tablet by mouth daily TAKE 1 TABLET DAILY 90 tablet 1    Semaglutide,0.25 or 0.5MG/DOS, (OZEMPIC, 0.25 OR 0.5 MG/DOSE,) 2 MG/1.5ML SOPN 0.25mg SC weekly for 4 weeks and then 0.5mg SC weekly 6 pen 3    insulin regular human (HUMULIN R U-500 KWIKPEN) 500 UNIT/ML SOPN concentrated injection pen 300-350 units TID,  Pt will need 27 boxes  She using sliding scale Max 1150 units of U-500 per day 54 each 1    cyclobenzaprine (FLEXERIL) 10 MG tablet TAKE 1 TABLET BY MOUTH THREE TIMES DAILY AS NEEDED FOR MUSCLE SPASMS 30 tablet 5    Insulin Pen Needle 32G X 4 MM MISC 1 each by Does not apply route 3 times daily 100 each 3    losartan (COZAAR) 100 MG tablet TAKE 1 TABLET DAILY 90 tablet 3    verapamil (CALAN SR) 240 MG extended release tablet TAKE 1 TABLET NIGHTLY 90 tablet 3    promethazine-codeine (PHENERGAN WITH CODEINE) 6.25-10 MG/5ML syrup Take 5 mLs by mouth every 4 hours as needed for Cough. 240 mL 1    atorvastatin (LIPITOR) 40 MG tablet TAKE 1 TABLET DAILY 90 tablet 3    blood glucose test strips (TRUE METRIX BLOOD GLUCOSE TEST) strip 1 each by In Vitro route 3 times daily As needed. 300 each 3    blood glucose test strips (EXACTECH TEST) strip 4 times daily. 100 strip 6    betamethasone dipropionate (DIPROLENE) 0.05 % ointment Apply topically 2 times daily. 90 g 3    Continuous Blood Gluc Sensor (FREESTYLE MING 14 DAY SENSOR) MISC 1 Units by Does not apply route every 14 days 2 each 5    Continuous Blood Gluc  (FREESTYLE MING 14 DAY READER) YOANDY 1 Units by Does not apply route daily 1 each 0    ASPIRIN 81 PO Take by mouth      furosemide (LASIX) 20 MG tablet 2 daily until edema is improved then 1 daily 60 tablet 3    busPIRone (BUSPAR) 15 MG tablet TAKE 1 TABLET THREE TIMES A DAY AS NEEDED FOR ANXIETY 90 tablet 11    FLUoxetine (PROZAC) 40 MG capsule 1 daily 90 capsule 3    diclofenac sodium 1 % GEL Apply 4 g topically 4 times daily 3 Tube 5    gabapentin (NEURONTIN) 100 MG capsule Take up to 4 caps at bedtime and 1 tid during waking hours 630 capsule 3     No current facility-administered medications on file prior to visit.       Allergies   Allergen Reactions    Actos [Pioglitazone Hydrochloride] Nausea Only    Metformin Nausea Only    Sulfa Antibiotics Swelling     Past Medical History:   Diagnosis Date    Anxiety and depression     Asthma     Edema     GERD (gastroesophageal reflux disease)     Hyperlipidemia     Hypertension     IBS (irritable bowel syndrome)     Irregular menstrual bleeding 4/16/2011    Dr. Liza Chan     Morbid obesity ([facility] Multinodular goiter 2010    Personal history of colonic polyps     Dr. Daja Romo- due for repeat colonoscopy     Positive PPD, treated     INH completed 2001    S/P cholecystectomy 2011    Type II or unspecified type diabetes mellitus without mention of complication, not stated as uncontrolled      Patient Active Problem List   Diagnosis    Diabetes mellitus ([facility] [address]    Edema    IBS (irritable bowel syndrome)    Hypertension    Allergic rhinitis    Obstructive sleep apnea syndrome    Morbid obesity with BMI of 50.0-59.9, adult ([facility])    Fibroid    Colon polyps    Depression    Anxiety    GERD (gastroesophageal reflux disease)    Vitamin D deficiency    Insomnia    Urinary incontinence    Chronic bilateral low back pain without sciatica    Right hip pain    Venous stasis dermatitis of both lower extremities    Multiple thyroid nodules    Pure hypercholesterolemia    Chronic pain of both knees    Bilateral ankle pain    Urinary frequency    Bilateral hand numbness    Plantar fasciitis    Right knee injury    SOB (shortness of breath)    Chronic midline thoracic back pain    Peripheral polyneuropathy     Past Surgical History:   Procedure Laterality Date     SECTION N/A 30 plus years ago    x2    CHOLECYSTECTOMY      3/2009    COLONOSCOPY      2012 Dr. Sharee Solis 5 yrs    COLONOSCOPY      repeat x 3 yrs HAD 5 POLPYS    HYSTEROSCOPY  2015    HYSTEROSCOPY, DILATATION AND CURETTAGE WITH NOVASURE ABLATION AND ABLATION    MYOMECTOMY      2003    TIBIA FRACTURE SURGERY      2006 w/ revision     Social History     Socioeconomic History    Marital status:      Spouse name: Not on file    Number of children: Not on file    Years of education: Not on file    Highest education level: Not on file   Occupational History    Not on file   Tobacco Use    Smoking status: Former     Packs/day: 0.50     Years: 10.00     Pack years: 5.00     Types: Cigarettes     Quit date: 1995     Years since quittin.3    Smokeless tobacco: Never   Vaping Use    Vaping Use: Never used   Substance and Sexual Activity    Alcohol use: Yes     Comment: once a month    Drug use: No    Sexual activity: Not on file   Other Topics Concern    Not on file   Social History Narrative    Not on file     Social Determinants of Health     Financial Resource Strain: Low Risk     Difficulty of Paying Living Expenses: Not hard at all   Food Insecurity: No Food Insecurity    Worried About Running Out of Food in the Last Year: Never true    Ran Out of Food in the Last Year: Never true   Transportation Needs: Not on file   Physical Activity: Inactive    Days of Exercise per Week: 0 days    Minutes of Exercise per Session: 10 min   Stress: Not on file   Social Connections: Not on file   Intimate Partner Violence: Not At Risk    Fear of Current or Ex-Partner: No    Emotionally Abused: No    Physically Abused: No    Sexually Abused: No   Housing Stability: Not on file      Family History   Problem Relation Age of Onset    Cancer Mother     Cancer Father        ROS  Review of Systems   Constitutional:  Negative for activity change, appetite change, chills, diaphoresis, fatigue, fever and unexpected weight change. HENT:  Negative for trouble swallowing and voice change. Eyes:  Negative for photophobia and visual disturbance. Respiratory:  Negative for apnea, cough, choking, chest tightness, shortness of breath, wheezing and stridor. Cardiovascular:  Negative for chest pain, palpitations and leg swelling. Gastrointestinal:  Negative for abdominal distention, abdominal pain, constipation, diarrhea, nausea and vomiting. Genitourinary:  Negative for difficulty urinating and dysuria. Skin:  Negative for rash and wound. Neurological:  Negative for dizziness, weakness and light-headedness. Psychiatric/Behavioral:  Negative for agitation and behavioral problems.       PE  Vitals:    10/04/22 1346   BP: 132/86   Site: Left Wrist Position: Sitting   Pulse: 82   Temp: 98.1 °F (36.7 °C)   SpO2: 99%   Weight: (!) 321 lb (145.6 kg)   Height: 5' 6\" (1.676 m)     Estimated body mass index is 51.81 kg/m² as calculated from the following:    Height as of this encounter: 5' 6\" (1.676 m). Weight as of this encounter: 321 lb (145.6 kg). Physical Exam  Vitals and nursing note reviewed. Constitutional:       General: She is not in acute distress. Appearance: She is well-developed. She is not diaphoretic. HENT:      Head: Normocephalic and atraumatic. Eyes:      General: No scleral icterus. Conjunctiva/sclera: Conjunctivae normal.      Pupils: Pupils are equal, round, and reactive to light. Cardiovascular:      Rate and Rhythm: Normal rate and regular rhythm. Heart sounds: Normal heart sounds. No murmur heard. No friction rub. No gallop. Pulmonary:      Effort: Pulmonary effort is normal. No respiratory distress. Breath sounds: Normal breath sounds. No wheezing or rales. Chest:      Chest wall: No tenderness. Abdominal:      General: Bowel sounds are normal. There is no distension. Palpations: Abdomen is soft. Tenderness: no abdominal tenderness   Musculoskeletal:         General: No tenderness or deformity. Normal range of motion. Cervical back: Normal range of motion and neck supple. Skin:     General: Skin is warm and dry. Neurological:      Mental Status: She is alert and oriented to person, place, and time. Cranial Nerves: No cranial nerve deficit. Sensory: No sensory deficit. Psychiatric:         Behavior: Behavior normal.         Anthony Goodman MD    This dictation was generated by voice recognition computer software. Although all attempts are made to edit the dictation for accuracy, there may be errors in the transcription that are not intended.

## 2022-10-04 NOTE — PATIENT INSTRUCTIONS
As we discussed:    Please continue to use salon pass, ice compression, icy hot, diclofenac gel as needed  Ibuprofen as need but try to avoid taking it everyday

## 2022-10-04 NOTE — ASSESSMENT & PLAN NOTE
This is a chronic issue  Recently exacerbated after she left heavy [employer]  Discussed with patient conservative treatment including ice rest, ibuprofen as needed

## 2022-10-12 ENCOUNTER — TELEPHONE (OUTPATIENT)
Dept: INTERNAL MEDICINE CLINIC | Age: 67
End: 2022-10-12

## 2022-10-12 RX ORDER — BENZONATATE 200 MG/1
200 CAPSULE ORAL 3 TIMES DAILY PRN
Qty: 30 CAPSULE | Refills: 0 | Status: SHIPPED | OUTPATIENT
Start: 2022-10-12 | End: 2022-10-19

## 2022-10-12 NOTE — TELEPHONE ENCOUNTER
----- Message from Bull Burch sent at 10/12/2022  3:12 PM EDT -----  Subject: Message to Provider    QUESTIONS  Information for Provider? pt tested negative for the flu and would like to   have the medication called in for the cough pharmacy at [facility]     ---------------------------------------------------------------------------  --------------  [address]  [phone]; OK to leave message on voicemail  ---------------------------------------------------------------------------  --------------  SCRIPT ANSWERS  undefined

## 2022-10-14 ENCOUNTER — TELEPHONE (OUTPATIENT)
Dept: INTERNAL MEDICINE CLINIC | Age: 67
End: 2022-10-14

## 2022-10-14 NOTE — TELEPHONE ENCOUNTER
----- Message from Anant Pereira, [address] [place] sent at 10/14/2022 11:58 AM EDT -----  Subject: Message to Provider    QUESTIONS  Information for Provider? Pt called and stated that she still has nasal   drip and upper chest cough and she has done a 2Nd COVID check that cam   back negative. Pt states that she was told that the DrMariano was going to send   in a RX for Z-kelin. Pt states that the pharmacy has not received a RX for   Z-kelin. Pt would like to know if the Z-kelin could be sent in to her   pharmacy. Please call the pt back and inform. ---------------------------------------------------------------------------  --------------  Dustin Kunz ANGELA  [phone]; OK to leave message on voicemail  ---------------------------------------------------------------------------  --------------  SCRIPT ANSWERS  Relationship to Patient?  Self

## 2022-10-17 ENCOUNTER — OFFICE VISIT (OUTPATIENT)
Dept: INTERNAL MEDICINE CLINIC | Age: 67
End: 2022-10-17
Payer: MEDICARE

## 2022-10-17 VITALS
WEIGHT: 293 LBS | BODY MASS INDEX: 47.09 KG/M2 | DIASTOLIC BLOOD PRESSURE: 80 MMHG | HEIGHT: 66 IN | SYSTOLIC BLOOD PRESSURE: 120 MMHG | OXYGEN SATURATION: 96 % | TEMPERATURE: 97.4 F | HEART RATE: 93 BPM

## 2022-10-17 DIAGNOSIS — J40 BRONCHITIS: Primary | ICD-10-CM

## 2022-10-17 DIAGNOSIS — I10 PRIMARY HYPERTENSION: ICD-10-CM

## 2022-10-17 DIAGNOSIS — E66.01 MORBID OBESITY WITH BMI OF 50.0-59.9, ADULT (HCC): ICD-10-CM

## 2022-10-17 DIAGNOSIS — Z79.4 TYPE 2 DIABETES MELLITUS WITH OTHER CIRCULATORY COMPLICATION, WITH LONG-TERM CURRENT USE OF INSULIN (HCC): ICD-10-CM

## 2022-10-17 DIAGNOSIS — Z23 NEED FOR INFLUENZA VACCINATION: ICD-10-CM

## 2022-10-17 DIAGNOSIS — E11.59 TYPE 2 DIABETES MELLITUS WITH OTHER CIRCULATORY COMPLICATION, WITH LONG-TERM CURRENT USE OF INSULIN (HCC): ICD-10-CM

## 2022-10-17 PROCEDURE — 99214 OFFICE O/P EST MOD 30 MIN: CPT | Performed by: STUDENT IN AN ORGANIZED HEALTH CARE EDUCATION/TRAINING PROGRAM

## 2022-10-17 PROCEDURE — 90694 VACC AIIV4 NO PRSRV 0.5ML IM: CPT | Performed by: STUDENT IN AN ORGANIZED HEALTH CARE EDUCATION/TRAINING PROGRAM

## 2022-10-17 PROCEDURE — 3046F HEMOGLOBIN A1C LEVEL >9.0%: CPT | Performed by: STUDENT IN AN ORGANIZED HEALTH CARE EDUCATION/TRAINING PROGRAM

## 2022-10-17 PROCEDURE — G0008 ADMIN INFLUENZA VIRUS VAC: HCPCS | Performed by: STUDENT IN AN ORGANIZED HEALTH CARE EDUCATION/TRAINING PROGRAM

## 2022-10-17 PROCEDURE — 1123F ACP DISCUSS/DSCN MKR DOCD: CPT | Performed by: STUDENT IN AN ORGANIZED HEALTH CARE EDUCATION/TRAINING PROGRAM

## 2022-10-17 RX ORDER — LOPERAMIDE HYDROCHLORIDE 2 MG/1
2 CAPSULE ORAL DAILY PRN
Qty: 7 CAPSULE | Refills: 0 | Status: SHIPPED | OUTPATIENT
Start: 2022-10-17 | End: 2022-10-24

## 2022-10-17 RX ORDER — PROMETHAZINE HYDROCHLORIDE AND CODEINE PHOSPHATE 6.25; 1 MG/5ML; MG/5ML
5 SYRUP ORAL EVERY 4 HOURS PRN
Qty: 280 ML | Refills: 0 | Status: SHIPPED | OUTPATIENT
Start: 2022-10-17 | End: 2022-10-20 | Stop reason: SDUPTHER

## 2022-10-17 RX ORDER — AZITHROMYCIN 250 MG/1
250 TABLET, FILM COATED ORAL SEE ADMIN INSTRUCTIONS
Qty: 6 TABLET | Refills: 0 | Status: SHIPPED | OUTPATIENT
Start: 2022-10-17 | End: 2022-10-22

## 2022-10-17 ASSESSMENT — ENCOUNTER SYMPTOMS
VOMITING: 0
ABDOMINAL DISTENTION: 0
COUGH: 1
PHOTOPHOBIA: 0
ABDOMINAL PAIN: 0
CHOKING: 0
TROUBLE SWALLOWING: 0
WHEEZING: 0
APNEA: 0
STRIDOR: 0
SHORTNESS OF BREATH: 0
NAUSEA: 0
VOICE CHANGE: 0
CONSTIPATION: 0
CHEST TIGHTNESS: 0
DIARRHEA: 0

## 2022-10-17 ASSESSMENT — PATIENT HEALTH QUESTIONNAIRE - PHQ9
8. MOVING OR SPEAKING SO SLOWLY THAT OTHER PEOPLE COULD HAVE NOTICED. OR THE OPPOSITE, BEING SO FIGETY OR RESTLESS THAT YOU HAVE BEEN MOVING AROUND A LOT MORE THAN USUAL: 0
SUM OF ALL RESPONSES TO PHQ QUESTIONS 1-9: 0
9. THOUGHTS THAT YOU WOULD BE BETTER OFF DEAD, OR OF HURTING YOURSELF: 0
2. FEELING DOWN, DEPRESSED OR HOPELESS: 0
1. LITTLE INTEREST OR PLEASURE IN DOING THINGS: 0
5. POOR APPETITE OR OVEREATING: 0
SUM OF ALL RESPONSES TO PHQ QUESTIONS 1-9: 0
SUM OF ALL RESPONSES TO PHQ9 QUESTIONS 1 & 2: 0
6. FEELING BAD ABOUT YOURSELF - OR THAT YOU ARE A FAILURE OR HAVE LET YOURSELF OR YOUR FAMILY DOWN: 0
10. IF YOU CHECKED OFF ANY PROBLEMS, HOW DIFFICULT HAVE THESE PROBLEMS MADE IT FOR YOU TO DO YOUR WORK, TAKE CARE OF THINGS AT HOME, OR GET ALONG WITH OTHER PEOPLE: 0
3. TROUBLE FALLING OR STAYING ASLEEP: 0
4. FEELING TIRED OR HAVING LITTLE ENERGY: 0
SUM OF ALL RESPONSES TO PHQ QUESTIONS 1-9: 0
SUM OF ALL RESPONSES TO PHQ QUESTIONS 1-9: 0
7. TROUBLE CONCENTRATING ON THINGS, SUCH AS READING THE NEWSPAPER OR WATCHING TELEVISION: 0

## 2022-10-17 NOTE — PROGRESS NOTES
Erica De Leon (:  1955) is a 78 y.o. female,Established patient, here for evaluation of the following chief complaint(s):  Follow-up         ASSESSMENT/PLAN:  1. Cough  Assessment & Plan:   Still has significant productive cough with sinus tenderness  Z-Abdulkadir  Cough syrup  Orders:  -     azithromycin (ZITHROMAX) 250 MG tablet; Take 1 tablet by mouth See Admin Instructions for 5 days 500mg on day 1 followed by 250mg on days 2 - 5, Disp-6 tablet, R-0Normal  -     promethazine-codeine (PHENERGAN WITH CODEINE) 6.25-10 MG/5ML syrup; Take 5 mLs by mouth every 4 hours as needed for Cough for up to 14 days. , Disp-280 mL, R-0Normal  2. Morbid obesity with BMI of 50.0-59.9, adult [facility])  Assessment & Plan:  Discussed at length with patient  She is motivated to lose weight, goal for next 3 to 4 months is 7 to 10% of her weight    3. Type 2 diabetes mellitus with other circulatory complication, with long-term current use of insulin [facility])  Assessment & Plan:   See Dr. Prudencio Zarate  Recently started on Ozempic  Continue with current regimen  Discussed weight loss, exercise and low-carb diet    4. Primary hypertension  Assessment & Plan:  Stable, at goal  Continue with current regimen  5. Need for influenza vaccination  -     Influenza, FLUAD, (age 72 y+), IM, Preservative Free, 0.5 mL    Return in about 3 months (around 2023) for Follow-up, discuss weight loss and diabetes. Subjective   SUBJECTIVE/OBJECTIVE:  HPI    Here for follow-up visit  Still have a lot of cough with thick sputum, also endorses sinus tenderness and some diarrhea  Has second home COVID test which is negative  She also wanted to discuss about her medications  Has been struggling with weight and diabetes out-of-control  She also requested renewal of her handicap placard        Review of Systems   Constitutional:  Negative for activity change, appetite change, chills, diaphoresis, fatigue, fever and unexpected weight change.    HENT:  Negative for trouble swallowing and voice change. Eyes:  Negative for photophobia and visual disturbance. Respiratory:  Positive for cough. Negative for apnea, choking, chest tightness, shortness of breath, wheezing and stridor. Cardiovascular:  Negative for chest pain, palpitations and leg swelling. Gastrointestinal:  Negative for abdominal distention, abdominal pain, constipation, diarrhea, nausea and vomiting. Genitourinary:  Negative for difficulty urinating and dysuria. Skin:  Negative for rash and wound. Neurological:  Negative for dizziness, weakness and light-headedness. Psychiatric/Behavioral:  Negative for agitation and behavioral problems. Current Outpatient Medications   Medication Sig Dispense Refill    azithromycin (ZITHROMAX) 250 MG tablet Take 1 tablet by mouth See Admin Instructions for 5 days 500mg on day 1 followed by 250mg on days 2 - 5 6 tablet 0    promethazine-codeine (PHENERGAN WITH CODEINE) 6.25-10 MG/5ML syrup Take 5 mLs by mouth every 4 hours as needed for Cough for up to 14 days.  280 mL 0    loperamide (IMODIUM) 2 MG capsule Take 1 capsule by mouth daily as needed for Diarrhea 7 capsule 0    benzonatate (TESSALON) 200 MG capsule Take 1 capsule by mouth 3 times daily as needed for Cough 30 capsule 0    cloNIDine (CATAPRES) 0.1 MG tablet Take 2 tablets by mouth 2 times daily 360 tablet 0    chlorthalidone (HYGROTON) 25 MG tablet Take 1 tablet by mouth daily TAKE 1 TABLET DAILY 90 tablet 1    Semaglutide,0.25 or 0.5MG/DOS, (OZEMPIC, 0.25 OR 0.5 MG/DOSE,) 2 MG/1.5ML SOPN 0.25mg SC weekly for 4 weeks and then 0.5mg SC weekly 6 pen 3    insulin regular human (HUMULIN R U-500 KWIKPEN) 500 UNIT/ML SOPN concentrated injection pen 300-350 units TID,  Pt will need 27 boxes  She using sliding scale Max 1150 units of U-500 per day 54 each 1    cyclobenzaprine (FLEXERIL) 10 MG tablet TAKE 1 TABLET BY MOUTH THREE TIMES DAILY AS NEEDED FOR MUSCLE SPASMS 30 tablet 5    Insulin Pen Needle 32G X 4 MM MISC 1 each by Does not apply route 3 times daily 100 each 3    losartan (COZAAR) 100 MG tablet TAKE 1 TABLET DAILY 90 tablet 3    verapamil (CALAN SR) 240 MG extended release tablet TAKE 1 TABLET NIGHTLY 90 tablet 3    atorvastatin (LIPITOR) 40 MG tablet TAKE 1 TABLET DAILY 90 tablet 3    blood glucose test strips (TRUE METRIX BLOOD GLUCOSE TEST) strip 1 each by In Vitro route 3 times daily As needed. 300 each 3    blood glucose test strips (EXACTECH TEST) strip 4 times daily. 100 strip 6    betamethasone dipropionate (DIPROLENE) 0.05 % ointment Apply topically 2 times daily. 90 g 3    Continuous Blood Gluc Sensor (FREESTYLE MING 14 DAY SENSOR) MISC 1 Units by Does not apply route every 14 days 2 each 5    Continuous Blood Gluc  (FREESTYLE MING 14 DAY READER) YOANDY 1 Units by Does not apply route daily 1 each 0    ASPIRIN 81 PO Take by mouth      furosemide (LASIX) 20 MG tablet 2 daily until edema is improved then 1 daily 60 tablet 3    busPIRone (BUSPAR) 15 MG tablet TAKE 1 TABLET THREE TIMES A DAY AS NEEDED FOR ANXIETY 90 tablet 11    FLUoxetine (PROZAC) 40 MG capsule 1 daily 90 capsule 3    diclofenac sodium 1 % GEL Apply 4 g topically 4 times daily 3 Tube 5    gabapentin (NEURONTIN) 100 MG capsule Take up to 4 caps at bedtime and 1 tid during waking hours 630 capsule 3     No current facility-administered medications for this visit.        Objective     Vitals:    10/17/22 1513   BP: 120/80   Pulse: 93   Temp: 97.4 °F (36.3 °C)   SpO2: 96%        Lab Results   Component Value Date    WBC 8.2 11/30/2021    HGB 15.0 11/30/2021    HCT 45.0 11/30/2021    MCV 91.6 11/30/2021     11/30/2021      Lab Results   Component Value Date     03/25/2022    K 3.7 03/25/2022     03/25/2022    CO2 28 03/25/2022    BUN 10 03/25/2022    CREATININE 0.7 03/25/2022    GLUCOSE 213 (H) 03/25/2022    CALCIUM 9.6 03/25/2022    PROT 8.2 11/30/2021    LABALBU 4.5 11/30/2021    BILITOT 0.4 11/30/2021 ALKPHOS 158 (H) 11/30/2021    AST 16 11/30/2021    ALT 15 11/30/2021    LABGLOM >60 03/25/2022    GFRAA >60 03/25/2022    AGRATIO 1.2 11/30/2021    GLOB 3.4 02/26/2021     Lab Results   Component Value Date    CHOL 118 11/30/2021    CHOL 124 03/14/2020    CHOL 161 01/15/2019     Lab Results   Component Value Date    TRIG 75 11/30/2021    TRIG 54 03/14/2020    TRIG 75 01/15/2019     Lab Results   Component Value Date    HDL 54 11/30/2021    HDL 55 03/14/2020    HDL 63 (H) 01/15/2019     Lab Results   Component Value Date    LDLCALC 49 11/30/2021    LDLCALC 58 03/14/2020    LDLCALC 83 01/15/2019     Lab Results   Component Value Date    LABVLDL 15 11/30/2021    LABVLDL 11 03/14/2020    LABVLDL 15 01/15/2019     No results found for: [facility]  Lab Results   Component Value Date    LABA1C 12.6 07/25/2022     Lab Results   Component Value Date    .3 06/06/2022           Physical Exam  Vitals and nursing note reviewed. Constitutional:       General: She is not in acute distress. Appearance: She is well-developed. She is obese. She is not ill-appearing, toxic-appearing or diaphoretic. HENT:      Head: Normocephalic and atraumatic. Eyes:      General: No scleral icterus. Conjunctiva/sclera: Conjunctivae normal.      Pupils: Pupils are equal, round, and reactive to light. Cardiovascular:      Rate and Rhythm: Normal rate and regular rhythm. Pulses: Normal pulses. Heart sounds: Normal heart sounds. No murmur heard. No friction rub. No gallop. Pulmonary:      Effort: Pulmonary effort is normal. No respiratory distress. Breath sounds: Normal breath sounds. No wheezing or rales. Chest:      Chest wall: No tenderness. Abdominal:      General: Bowel sounds are normal. There is no distension. Palpations: Abdomen is soft. Musculoskeletal:         General: No tenderness or deformity. Normal range of motion. Cervical back: Normal range of motion and neck supple.    Skin: General: Skin is warm and dry. Neurological:      Mental Status: She is alert and oriented to person, place, and time. Cranial Nerves: No cranial nerve deficit. Sensory: No sensory deficit. Psychiatric:         Behavior: Behavior normal.            Please note that this chart was generated using dragon dictation software. Although every effort was made to ensure the accuracy of this automated transcription, some errors in transcription may have occurred.        --Chaparrita Lopez MD

## 2022-10-17 NOTE — PATIENT INSTRUCTIONS
As we discussed:  No change in your medications today. Please continue to take your medications as instructed.

## 2022-10-17 NOTE — ASSESSMENT & PLAN NOTE
Discussed at length with patient  She is motivated to lose weight, goal for next 3 to 4 months is 7 to 10% of her weight

## 2022-10-17 NOTE — LETTER
[facility] [address]  [address]  Phone: [phone]  Fax: [phone]    Verner Oats, MD        October 17, 2022     Patient: Fadi Murguia   YOB: 1955   Date of Visit: 10/17/2022       To Whom It May Concern:    Please excuse Ms. Fadi Murguia from work and work related obligations until 10/20/2022 as she was under our care. If you have any questions or concerns, please don't hesitate to call.     Sincerely,        Verner Oats, MD

## 2022-10-17 NOTE — ASSESSMENT & PLAN NOTE
See Dr. Deven Remy  Recently started on Ozempic  Continue with current regimen  Discussed weight loss, exercise and low-carb diet

## 2022-10-20 DIAGNOSIS — J40 BRONCHITIS: ICD-10-CM

## 2022-10-20 RX ORDER — PROMETHAZINE HYDROCHLORIDE AND CODEINE PHOSPHATE 6.25; 1 MG/5ML; MG/5ML
5 SYRUP ORAL EVERY 4 HOURS PRN
Qty: 280 ML | Refills: 0 | Status: SHIPPED | OUTPATIENT
Start: 2022-10-20 | End: 2022-11-03

## 2022-10-24 RX ORDER — BENZONATATE 100 MG/1
100 CAPSULE ORAL 2 TIMES DAILY PRN
Qty: 20 CAPSULE | Refills: 0 | Status: SHIPPED | OUTPATIENT
Start: 2022-10-24 | End: 2022-10-31

## 2022-10-28 ENCOUNTER — TELEPHONE (OUTPATIENT)
Dept: INTERNAL MEDICINE CLINIC | Age: 67
End: 2022-10-28

## 2022-10-28 NOTE — LETTER
[facility] [address]  [address] [address]  Phone: [phone]  Fax: [phone]    Stephanie Bowers MD         October 28, 2022     Patient: Brandie Clay   YOB: 1955   Date of Visit: 10/28/2022       To Whom It May Concern: It is my medical opinion that Ranulfo Quintana requires a disability parking placard for the following reasons:  She cannot walk 200 feet without stopping to rest.  Duration of need: 5 years    If you have any questions or concerns, please don't hesitate to call.     Sincerely,        Stephanie Bowers MD [email or web address] LICENSE # [id number]

## 2022-10-28 NOTE — TELEPHONE ENCOUNTER
[phone] (home)   SPOKE WITH PATIENT REQUESTED   HANDICAP LETTER /MENTION @ LAST VISIT.     LETTER PRINTED AND FAXED [phone] WK# PER PATIENT

## 2022-10-28 NOTE — TELEPHONE ENCOUNTER
----- Message from Kat Cole sent at 10/28/2022  8:38 AM EDT -----  Subject: Message to Provider    QUESTIONS  Information for Provider? Pt would like this message to reach Sade, the   MA of PCP Marv Almeida. Pt is following up on her message she sent in [address] Pt needs a new script for a disability handicap placard for her car that   says her name, the length of the disability (5 years as they  every   5 years). Pt can pick it up from the office when the script is ready. The   Pt states the placard needs renewal by sometime in November but is unsure   if it is at the start or end of the month.  ---------------------------------------------------------------------------  --------------  Coleen Hill [facility]  [phone]; OK to leave message on voicemail  ---------------------------------------------------------------------------  --------------  SCRIPT ANSWERS  Relationship to Patient?  Self

## 2022-11-10 DIAGNOSIS — R05.9 COUGH, UNSPECIFIED TYPE: Primary | ICD-10-CM

## 2022-11-10 RX ORDER — ALBUTEROL SULFATE 90 UG/1
2 AEROSOL, METERED RESPIRATORY (INHALATION) 4 TIMES DAILY PRN
Qty: 18 G | Refills: 0 | Status: SHIPPED | OUTPATIENT
Start: 2022-11-10

## 2022-11-11 ENCOUNTER — TELEMEDICINE (OUTPATIENT)
Dept: ENDOCRINOLOGY | Age: 67
End: 2022-11-11
Payer: MEDICARE

## 2022-11-11 DIAGNOSIS — E11.65 UNCONTROLLED TYPE 2 DIABETES MELLITUS WITH HYPERGLYCEMIA (HCC): Primary | ICD-10-CM

## 2022-11-11 PROCEDURE — 99214 OFFICE O/P EST MOD 30 MIN: CPT | Performed by: INTERNAL MEDICINE

## 2022-11-11 PROCEDURE — 1123F ACP DISCUSS/DSCN MKR DOCD: CPT | Performed by: INTERNAL MEDICINE

## 2022-11-11 PROCEDURE — 3046F HEMOGLOBIN A1C LEVEL >9.0%: CPT | Performed by: INTERNAL MEDICINE

## 2022-11-11 RX ORDER — BENZONATATE 100 MG/1
CAPSULE ORAL
COMMUNITY
Start: 2022-10-31

## 2022-11-11 RX ORDER — SEMAGLUTIDE 1.34 MG/ML
INJECTION, SOLUTION SUBCUTANEOUS
Qty: 9 ML | Refills: 2 | Status: SHIPPED | OUTPATIENT
Start: 2022-11-11

## 2022-11-11 NOTE — PROGRESS NOTES
Seen as f/u patient for diabetes      Patient was evaluated through a synchronous (real-time) audio-video  encounter. The patient (or guardian if applicable) is aware that this is a billable service, which includes applicable co-pays. This Virtual Visit was  conducted with patient's (and/or legal guardian's) consent. The visit was conducted pursuant to the emergency declaration under the [address] [place] [address] and the [employer] General Act. Patient identification was verified,  and a caregiver was present when appropriate. The patient was located in a state where the provider was licensed to provide care.   Patient Location Home  Provider Home    Interm:    States has respiratory infection  Inquires about steroids  Had lows with 300 units  She is off jardiance, did not take it since on ozempic  Inquires about local support group for frequent discussion    Not using CGM for a month as had fall off    Diagnosed with Type 2 diabetes mellitus 8 years ago  Known diabetic complications: Retinopathy NPDR    Current diabetic medications   U-500  270/270/270  SSI 5 for 50 > 150  Ozempic  Jardiance 10mg    Previous:    U-500 70/80/70  But taking only with lunch n dinner   SSI 5 for 61>063  Trulicity    Moderate, uncontrolled    Previous:    Lantus 70/70    But missing second shot 3 days a week   But doing 66/66  Humalog 38 units AC TID, taking it 2/week    But doing 34   SSI 2 for 50>150  She has relative hypoglycemia in the 70s    H/o use of lantus, novolog, victoza, januvia    Metformin and ER caused GI symptoms    Last A1c 12.3%<---- 9.5%<----- 9.2%<----10.5%<-----9.3%<------9.9%<-----9.2%<------10.2 on 5/17<--- 8.4 on 12/16<-----7.8 on 6/16<------ 9.6 on 2/16<---- 10.5 on 10/15<-----11.8 on 8/15<---14.2 <--- 10.6 <--- 9.4    Prior visit with dietician: Yes   Current diet: on average, 3 meals per day does not follow low CHO  Current exercise: walking   Current monitoring regimen: home blood tests   1-2 times per day  Has brought blood glucose log/meter:   Home blood sugar records:       Any episodes of hypoglycemia? 44    No Hx of CAD , PVD, CVA    Hyperlipidemia:controlled on statin LDL 51 on   Simvastatin 40mg  LDL 58 on 3/20  LDL 49 on   Last eye exam:   Last foot exam:   Last microalbumin to creatinine ratio:3/22    HTN: On multiple medications, taking toprol 50mg?   Losartan 100mg verapamil 240mg Chlorthalidone 25mg clonidine 0.1mg TID  Uncontrolled    She has thyroid nodules, not on medication    Past Medical History:   Diagnosis Date    Anxiety and depression     Asthma     Edema     GERD (gastroesophageal reflux disease)     Hyperlipidemia     Hypertension     IBS (irritable bowel syndrome)     Irregular menstrual bleeding 2011    Dr. Brianna Choi     Morbid obesity [facility])     Multinodular goiter 2010    Personal history of colonic polyps     Dr. Kenney Hands- due for repeat colonoscopy     Positive PPD, treated     INH completed 2001    S/P cholecystectomy 2011    Type II or unspecified type diabetes mellitus without mention of complication, not stated as uncontrolled      Past Surgical History:   Procedure Laterality Date     SECTION N/A 30 plus years ago    x2    CHOLECYSTECTOMY      3/2009    COLONOSCOPY      2012 Dr. Verlena Sacks 5 yrs    COLONOSCOPY      repeat x 3 yrs HAD 5 POLPYS    HYSTEROSCOPY  2015    HYSTEROSCOPY, DILATATION AND CURETTAGE WITH NOVASURE ABLATION AND ABLATION    MYOMECTOMY      2003    TIBIA FRACTURE SURGERY      2006 w/ revision     Current Outpatient Medications   Medication Sig Dispense Refill    cloNIDine (CATAPRES) 0.1 MG tablet Take 1 tablet by mouth every 8 hours 270 tablet 3    diclofenac sodium 1 % GEL Apply 4 g topically 4 times daily 3 Tube 5    buPROPion (WELLBUTRIN XL) 300 MG extended release tablet Take 1 tablet by mouth every morning 90 tablet 3    chlorthalidone (HYGROTON) 25 MG tablet Take 1 tablet by mouth daily 30 tablet 3    busPIRone (BUSPAR) 15 MG tablet Take 15 mg by mouth 3 times daily as needed (anxiety) 90 tablet 3    atorvastatin (LIPITOR) 40 MG tablet TAKE 1 TABLET DAILY 90 tablet 3    FLUoxetine (PROZAC) 40 MG capsule TAKE 1 CAPSULE DAILY 90 capsule 3    Dulaglutide (TRULICITY) 2.81 MP/1.7GF SOPN 0.75 mg weekly 12 pen 3    insulin regular human (HUMULIN R U-500 KWIKPEN) 500 UNIT/ML SOPN concentrated injection pen  units before breakfast and dinner 18 pen 1    betamethasone dipropionate (DIPROLENE) 0.05 % ointment APPLY TOPICALLY DAILY 90 g 3    verapamil (CALAN SR) 240 MG extended release tablet TAKE 1 TABLET NIGHTLY 90 tablet 3    metoprolol succinate (TOPROL XL) 50 MG extended release tablet Take 1 tablet by mouth nightly 30 tablet 3    losartan (COZAAR) 100 MG tablet TAKE 1 TABLET DAILY 90 tablet 4    montelukast (SINGULAIR) 10 MG tablet Take 1 tablet by mouth nightly 90 tablet 3    Insulin Pen Needle 32G X 4 MM MISC 1 each by Does not apply route daily 100 each 3    loratadine (CLARITIN) 10 MG tablet Take 10 mg by mouth daily      ONE TOUCH LANCETS MISC 1 each by Does not apply route daily 100 each 3     No current facility-administered medications for this visit. Review of Systems    Constitutional: Negative for weight loss and malaise/fatigue. Negative for fever and chills. HENT: Negative for hearing loss, ear pain, nosebleeds, neck pain and tinnitus. Eyes: Negative for blurred vision. Negative for double vision, photophobia and pain. Respiratory: Negative for cough and sputum production. Cardiovascular: Negative for chest pain, palpitations and leg swelling. Gastrointestinal: Negative for nausea, vomiting and abdominal pain. Genitourinary: Negative for dysuria, urgency and frequency. Musculoskeletal: Negative for back pain. No joint pain  Skin: Negative for itching and rash. Neurological: Negative for dizziness. Negative for tingling, tremors, focal weakness and + headaches. Endo/Heme/Allergies: see HPI  Psychiatric/Behavioral: Negative for depression and substance abuse. +fibromyalgia        PHYSICAL EXAMINATION:  [ INSTRUCTIONS:  \"[x]\" Indicates a positive item  \"[]\" Indicates a negative item  -- DELETE ALL ITEMS NOT EXAMINED]  Vital Signs: (As obtained by patient/caregiver or practitioner observation)    Blood pressure-  Heart rate-    Respiratory rate-    Temperature-  Pulse oximetry-     Constitutional Appears well-developed and well-nourished No apparent distress        Mental status  Alert and awake  Oriented to person/place/time  Able to follow commands      Eyes:  EOM    [x]  Normal    Sclera  [x]  Normal           Discharge [x]  None visible      HENT:   [x] Normocephalic, atraumatic. [x] Mouth/Throat: Mucous membranes are moist.     External Ears [x] Normal  no discharge    Neck: [x] No visualized mass  no swelling    Pulmonary/Chest: [x] Respiratory effort normal.  [x] No visualized signs of difficulty breathing or respiratory distress             Musculoskeletal:   [x] Normal gait with no signs of ataxia         [x] Normal range of motion of neck          Head and neck stable, appears normal ROM, strength good    Neurological:        [x] No Facial Asymmetry (Cranial nerve 7 motor function) (limited exam to video visit)          [x] No gaze palsy                Skin:        [x] No significant exanthematous lesions or discoloration noted on facial skin                 Psychiatric:       [x] Normal Affect [x] No Hallucinations            Other pertinent observable physical exam findings-     Due to this being a TeleHealth encounter, evaluation of the following organ systems is limited: Vitals/Constitutional/EENT/Resp/CV/GI//MS/Neuro/Skin/Heme-Lymph-Imm.       Services were provided through a video synchronous discussion virtually to substitute for in-person clinic visit.         5/21  Skeletal foot exam is normal, no skin lesions, toenails are normal,10 g monofilament is 10/10 on the right and 10/10 on the left  Right sole callus/thickening, no active discharge, no redness    Lab Reviewed   No components found for: [facility]  Lab Results   Component Value Date    TRIG 54 03/14/2020    TRIG 75 01/15/2019    TRIG 60 09/21/2017     Lab Results   Component Value Date    HDL 55 03/14/2020    HDL 63 (H) 01/15/2019    HDL 66 (H) 09/21/2017     Lab Results   Component Value Date    LDLCALC 58 03/14/2020    LDLCALC 83 01/15/2019    LDLCALC 51 09/21/2017     Lab Results   Component Value Date    LABVLDL 11 03/14/2020    LABVLDL 15 01/15/2019    LABVLDL 12 09/21/2017     Lab Results   Component Value Date    LABA1C 10.5 12/31/2019       Assessment:     Taye Smith is a 62 y.o. female with :    1.T2DM: Longstanding, uncontrolled. Discussed goals, provided education. Given her A1c and hyperglycemia,switched back to U-500 , discussed risk of lows, needs self monitoring. Advised to take insulin 30 min before the meal.  Has hyperglycemia,stressed importance of taking all three doses. 20-30 gm CHO with meal  Needs more glucose monitoring, restart CGM, advised can use tegaderm, can show application in office  Mounjaro not covered, on ozempic, increase dose  Restart jardiance  Advised if she is started on steroids, will need to adjust insulin dose and will be at risk of hyperglycemia  2. HTN. Jacy Whittington Aldosterone and renin nl, lab did not check MN. May need aldactone  3. HLD: LDL at goal, on statin  4. Obesity: Discussed weight loss, refer to bariatrics  5. Depression  6. MNG: Per history-h/o FNA, reports FNA was benign years ago, will need USG, did not have done  7. ADELINA  8. Vitamin D deficiency    Plan:      U-500  270 units before meals   SSI   Increase ozempic   Restart jardiance   Advised to rotate site   Advise to check blood sugar 4 times a day   Patient to send blood sugar log for titration, weekly Refer to diabetes education. Advise to low simple carbohydrate and protein with each  meal diet. Diabetes Care: recommend yearly eye exam, foot exam and urine microalbumin to   creatinine ratio. -Hyperlipidemia, LDL goal is <100 mg/dl   -Hypertension; Continue same  -Daily ASA: Added 81mg   -Smoking status: Non smoker  Advised to send log every week  Discussed patient can see her every 1-2 weeks until controlled, states difficult due to job.    Advised of evening support group

## 2022-11-16 ENCOUNTER — NURSE ONLY (OUTPATIENT)
Dept: ENDOCRINOLOGY | Age: 67
End: 2022-11-16

## 2022-12-06 ENCOUNTER — PATIENT MESSAGE (OUTPATIENT)
Dept: ENDOCRINOLOGY | Age: 67
End: 2022-12-06

## 2022-12-07 RX ORDER — SEMAGLUTIDE 1.34 MG/ML
INJECTION, SOLUTION SUBCUTANEOUS
Qty: 3 ML | Refills: 0 | Status: SHIPPED | OUTPATIENT
Start: 2022-12-07

## 2022-12-07 NOTE — TELEPHONE ENCOUNTER
From: Ruthie Smith  To: Dr. Dickinson [facility]: 12/6/2022 7:03 PM EST  Subject: Sara Sero     HI! I was notified by my insurance that my medication would be delivered on 12/31/22 due to manufacturing issue. They suggested I reach out to you and have you forward a short term script to [facility] at [phone]. I will fol. ow up after I hear from your office. I can be reached at [address] Thank you!

## 2023-01-13 DIAGNOSIS — E11.65 UNCONTROLLED TYPE 2 DIABETES MELLITUS WITH HYPERGLYCEMIA (HCC): Primary | ICD-10-CM

## 2023-01-16 ENCOUNTER — OFFICE VISIT (OUTPATIENT)
Dept: INTERNAL MEDICINE CLINIC | Age: 68
End: 2023-01-16
Payer: MEDICARE

## 2023-01-16 ENCOUNTER — TELEPHONE (OUTPATIENT)
Dept: ENDOCRINOLOGY | Age: 68
End: 2023-01-16

## 2023-01-16 VITALS
HEART RATE: 81 BPM | BODY MASS INDEX: 48.71 KG/M2 | OXYGEN SATURATION: 97 % | DIASTOLIC BLOOD PRESSURE: 80 MMHG | SYSTOLIC BLOOD PRESSURE: 130 MMHG | TEMPERATURE: 97.2 F | WEIGHT: 293 LBS

## 2023-01-16 DIAGNOSIS — I10 PRIMARY HYPERTENSION: Primary | ICD-10-CM

## 2023-01-16 DIAGNOSIS — E11.65 UNCONTROLLED TYPE 2 DIABETES MELLITUS WITH HYPERGLYCEMIA (HCC): ICD-10-CM

## 2023-01-16 DIAGNOSIS — R74.8 ELEVATED ALKALINE PHOSPHATASE LEVEL: Primary | ICD-10-CM

## 2023-01-16 DIAGNOSIS — Z12.31 SCREENING MAMMOGRAM FOR BREAST CANCER: ICD-10-CM

## 2023-01-16 DIAGNOSIS — Z12.11 ENCOUNTER FOR SCREENING COLONOSCOPY: ICD-10-CM

## 2023-01-16 LAB
A/G RATIO: 1.3 (ref 1.1–2.2)
ALBUMIN SERPL-MCNC: 4.1 G/DL (ref 3.4–5)
ALP BLD-CCNC: 156 U/L (ref 40–129)
ALT SERPL-CCNC: 8 U/L (ref 10–40)
ANION GAP SERPL CALCULATED.3IONS-SCNC: 16 MMOL/L (ref 3–16)
AST SERPL-CCNC: 9 U/L (ref 15–37)
BASOPHILS ABSOLUTE: 0 K/UL (ref 0–0.2)
BASOPHILS RELATIVE PERCENT: 0.3 %
BILIRUB SERPL-MCNC: 0.3 MG/DL (ref 0–1)
BUN BLDV-MCNC: 10 MG/DL (ref 7–20)
CALCIUM SERPL-MCNC: 9.8 MG/DL (ref 8.3–10.6)
CHLORIDE BLD-SCNC: 98 MMOL/L (ref 99–110)
CHOLESTEROL, TOTAL: 153 MG/DL (ref 0–199)
CO2: 26 MMOL/L (ref 21–32)
CREAT SERPL-MCNC: 0.8 MG/DL (ref 0.6–1.2)
CREATININE URINE: 54.7 MG/DL (ref 28–259)
EOSINOPHILS ABSOLUTE: 0.1 K/UL (ref 0–0.6)
EOSINOPHILS RELATIVE PERCENT: 1.2 %
GFR SERPL CREATININE-BSD FRML MDRD: >60 ML/MIN/{1.73_M2}
GLUCOSE BLD-MCNC: 303 MG/DL (ref 70–99)
HCT VFR BLD CALC: 43.7 % (ref 36–48)
HDLC SERPL-MCNC: 65 MG/DL (ref 40–60)
HEMOGLOBIN: 14.5 G/DL (ref 12–16)
LDL CHOLESTEROL CALCULATED: 74 MG/DL
LYMPHOCYTES ABSOLUTE: 3.1 K/UL (ref 1–5.1)
LYMPHOCYTES RELATIVE PERCENT: 36 %
MCH RBC QN AUTO: 30.2 PG (ref 26–34)
MCHC RBC AUTO-ENTMCNC: 33.1 G/DL (ref 31–36)
MCV RBC AUTO: 91.2 FL (ref 80–100)
MICROALBUMIN UR-MCNC: <1.2 MG/DL
MICROALBUMIN/CREAT UR-RTO: NORMAL MG/G (ref 0–30)
MONOCYTES ABSOLUTE: 0.6 K/UL (ref 0–1.3)
MONOCYTES RELATIVE PERCENT: 6.6 %
NEUTROPHILS ABSOLUTE: 4.8 K/UL (ref 1.7–7.7)
NEUTROPHILS RELATIVE PERCENT: 55.9 %
PDW BLD-RTO: 13.7 % (ref 12.4–15.4)
PLATELET # BLD: 251 K/UL (ref 135–450)
PMV BLD AUTO: 12.4 FL (ref 5–10.5)
POTASSIUM SERPL-SCNC: 4.1 MMOL/L (ref 3.5–5.1)
RBC # BLD: 4.79 M/UL (ref 4–5.2)
SODIUM BLD-SCNC: 140 MMOL/L (ref 136–145)
TOTAL PROTEIN: 7.2 G/DL (ref 6.4–8.2)
TRIGL SERPL-MCNC: 70 MG/DL (ref 0–150)
TSH REFLEX: 1.1 UIU/ML (ref 0.27–4.2)
VLDLC SERPL CALC-MCNC: 14 MG/DL
WBC # BLD: 8.6 K/UL (ref 4–11)

## 2023-01-16 PROCEDURE — 99213 OFFICE O/P EST LOW 20 MIN: CPT | Performed by: STUDENT IN AN ORGANIZED HEALTH CARE EDUCATION/TRAINING PROGRAM

## 2023-01-16 PROCEDURE — 1123F ACP DISCUSS/DSCN MKR DOCD: CPT | Performed by: STUDENT IN AN ORGANIZED HEALTH CARE EDUCATION/TRAINING PROGRAM

## 2023-01-16 PROCEDURE — G8399 PT W/DXA RESULTS DOCUMENT: HCPCS | Performed by: STUDENT IN AN ORGANIZED HEALTH CARE EDUCATION/TRAINING PROGRAM

## 2023-01-16 PROCEDURE — 3017F COLORECTAL CA SCREEN DOC REV: CPT | Performed by: STUDENT IN AN ORGANIZED HEALTH CARE EDUCATION/TRAINING PROGRAM

## 2023-01-16 PROCEDURE — 3075F SYST BP GE 130 - 139MM HG: CPT | Performed by: STUDENT IN AN ORGANIZED HEALTH CARE EDUCATION/TRAINING PROGRAM

## 2023-01-16 PROCEDURE — 3079F DIAST BP 80-89 MM HG: CPT | Performed by: STUDENT IN AN ORGANIZED HEALTH CARE EDUCATION/TRAINING PROGRAM

## 2023-01-16 PROCEDURE — 3046F HEMOGLOBIN A1C LEVEL >9.0%: CPT | Performed by: STUDENT IN AN ORGANIZED HEALTH CARE EDUCATION/TRAINING PROGRAM

## 2023-01-16 PROCEDURE — 2022F DILAT RTA XM EVC RTNOPTHY: CPT | Performed by: STUDENT IN AN ORGANIZED HEALTH CARE EDUCATION/TRAINING PROGRAM

## 2023-01-16 PROCEDURE — G8427 DOCREV CUR MEDS BY ELIG CLIN: HCPCS | Performed by: STUDENT IN AN ORGANIZED HEALTH CARE EDUCATION/TRAINING PROGRAM

## 2023-01-16 PROCEDURE — 1090F PRES/ABSN URINE INCON ASSESS: CPT | Performed by: STUDENT IN AN ORGANIZED HEALTH CARE EDUCATION/TRAINING PROGRAM

## 2023-01-16 PROCEDURE — 1036F TOBACCO NON-USER: CPT | Performed by: STUDENT IN AN ORGANIZED HEALTH CARE EDUCATION/TRAINING PROGRAM

## 2023-01-16 PROCEDURE — G8484 FLU IMMUNIZE NO ADMIN: HCPCS | Performed by: STUDENT IN AN ORGANIZED HEALTH CARE EDUCATION/TRAINING PROGRAM

## 2023-01-16 PROCEDURE — G8417 CALC BMI ABV UP PARAM F/U: HCPCS | Performed by: STUDENT IN AN ORGANIZED HEALTH CARE EDUCATION/TRAINING PROGRAM

## 2023-01-16 RX ORDER — ZOSTER VACCINE RECOMBINANT, ADJUVANTED 50 MCG/0.5
0.5 KIT INTRAMUSCULAR SEE ADMIN INSTRUCTIONS
Qty: 0.5 ML | Refills: 0 | Status: SHIPPED | OUTPATIENT
Start: 2023-01-16 | End: 2023-07-15

## 2023-01-16 RX ORDER — ATORVASTATIN CALCIUM 40 MG/1
TABLET, FILM COATED ORAL
Qty: 90 TABLET | Refills: 1 | Status: SHIPPED | OUTPATIENT
Start: 2023-01-16 | End: 2023-01-16

## 2023-01-16 RX ORDER — SEMAGLUTIDE 1.34 MG/ML
INJECTION, SOLUTION SUBCUTANEOUS
Qty: 3 ML | Refills: 3 | Status: SHIPPED | OUTPATIENT
Start: 2023-01-16 | End: 2023-01-19 | Stop reason: SDUPTHER

## 2023-01-16 RX ORDER — CHLORTHALIDONE 25 MG/1
25 TABLET ORAL DAILY
Qty: 90 TABLET | Refills: 1 | Status: SHIPPED | OUTPATIENT
Start: 2023-01-16

## 2023-01-16 RX ORDER — CHLORTHALIDONE 25 MG/1
25 TABLET ORAL DAILY
Qty: 90 TABLET | Refills: 1 | Status: SHIPPED | OUTPATIENT
Start: 2023-01-16 | End: 2023-01-16

## 2023-01-16 RX ORDER — ATORVASTATIN CALCIUM 40 MG/1
TABLET, FILM COATED ORAL
Qty: 90 TABLET | Refills: 1 | Status: SHIPPED | OUTPATIENT
Start: 2023-01-16

## 2023-01-16 ASSESSMENT — PATIENT HEALTH QUESTIONNAIRE - PHQ9
1. LITTLE INTEREST OR PLEASURE IN DOING THINGS: 0
SUM OF ALL RESPONSES TO PHQ QUESTIONS 1-9: 0
2. FEELING DOWN, DEPRESSED OR HOPELESS: 0
SUM OF ALL RESPONSES TO PHQ QUESTIONS 1-9: 0
SUM OF ALL RESPONSES TO PHQ9 QUESTIONS 1 & 2: 0

## 2023-01-16 ASSESSMENT — ENCOUNTER SYMPTOMS
CHOKING: 0
VOMITING: 0
APNEA: 0
VOICE CHANGE: 0
PHOTOPHOBIA: 0
CONSTIPATION: 0
DIARRHEA: 0
COUGH: 0
TROUBLE SWALLOWING: 0
ABDOMINAL PAIN: 0
NAUSEA: 0
SHORTNESS OF BREATH: 0
WHEEZING: 0
STRIDOR: 0
ABDOMINAL DISTENTION: 0
CHEST TIGHTNESS: 0

## 2023-01-16 NOTE — TELEPHONE ENCOUNTER
[facility] calling to advise ozempic is not available in any dose. Can Doctor Yesica Rodriguez select something else?      Anisa from [place], [phone]

## 2023-01-16 NOTE — TELEPHONE ENCOUNTER
Medication:   Requested Prescriptions     Pending Prescriptions Disp Refills    Semaglutide, 1 MG/DOSE, (OZEMPIC, 1 MG/DOSE,) 4 MG/3ML SOPN [Pharmacy Med Name: Lynn Jaime 1MG PER DOSE (1X4MG PEN)] 3 mL 3     Sig: INJECT 1 MG SUBCUTANEOUS WEEKLY       Last Filled:      Patient Phone Number: [phone] (home)     Last appt: 11/11/2022   Next appt: 1/30/2023    Last Labs DM:   Lab Results   Component Value Date/Time    LABA1C 12.6 07/25/2022 02:54 PM

## 2023-01-16 NOTE — PROGRESS NOTES
Mirian Becerril (:  1955) is a 78 y.o. female,Established patient, here for evaluation of the following chief complaint(s):  Diabetes (Follow up )         ASSESSMENT/PLAN:  1. Primary hypertension  Assessment & Plan:   Blood pressure at goal continue with current regiment  2. Uncontrolled type 2 diabetes mellitus with hyperglycemia (HCC)  Assessment & Plan:  Sees Dr. Rhonda Andrew continuous glucose monitoring  Last hemoglobin A1c was 12.6%, pending repeat A1c today  We will repeat lab work along with microalbumin creatinine ratio  Orders:  -     CBC with Auto Differential; Future  -     Comprehensive Metabolic Panel; Future  -     LIPID PANEL; Future  -     TSH with Reflex; Future  -     MICROALBUMIN / CREATININE URINE RATIO; Future  3. Screening mammogram for breast cancer  -     COMFORT DIGITAL SCREEN W OR WO CAD BILATERAL; Future  4. Encounter for screening colonoscopy  -     Amb External Referral To Gastroenterology    Return in about 4 months (around 2023). Subjective   SUBJECTIVE/OBJECTIVE:  HPI  The patient presented today for follow-up visit. Report that overall she is doing well. Has been monitoring her blood sugar at home with [facility] (formerly [facility] She has an appointment with Dr. Tana De La Cruz by the end of this month. Denies any acute complaints today  She reports compliance with all her medications  Request medication be filled      Review of Systems   Constitutional:  Negative for activity change, appetite change, chills, diaphoresis, fatigue, fever and unexpected weight change. HENT:  Negative for trouble swallowing and voice change. Eyes:  Negative for photophobia and visual disturbance. Respiratory:  Negative for apnea, cough, choking, chest tightness, shortness of breath, wheezing and stridor. Cardiovascular:  Negative for chest pain, palpitations and leg swelling.    Gastrointestinal:  Negative for abdominal distention, abdominal pain, constipation, diarrhea, nausea and vomiting. Genitourinary:  Negative for difficulty urinating and dysuria. Skin:  Negative for rash and wound. Neurological:  Negative for dizziness, weakness and light-headedness. Psychiatric/Behavioral:  Negative for agitation and behavioral problems. Current Outpatient Medications   Medication Sig Dispense Refill    Semaglutide, 1 MG/DOSE, (OZEMPIC, 1 MG/DOSE,) 4 MG/3ML SOPN INJECT 1 MG SUBCUTANEOUS WEEKLY 3 mL 3    atorvastatin (LIPITOR) 40 MG tablet TAKE 1 TABLET DAILY 90 tablet 1    chlorthalidone (HYGROTON) 25 MG tablet Take 1 tablet by mouth daily TAKE 1 TABLET DAILY 90 tablet 1    zoster recombinant adjuvanted vaccine (SHINGRIX) 50 MCG/0.5ML SUSR injection Inject 0.5 mLs into the muscle See Admin Instructions 1 dose now and repeat in 2-6 months 0.5 mL 0    empagliflozin (JARDIANCE) 10 MG tablet Take 1 tablet by mouth daily 90 tablet 1    albuterol sulfate HFA (VENTOLIN HFA) 108 (90 Base) MCG/ACT inhaler Inhale 2 puffs into the lungs 4 times daily as needed for Wheezing 18 g 0    cloNIDine (CATAPRES) 0.1 MG tablet Take 2 tablets by mouth 2 times daily 360 tablet 0    insulin regular human (HUMULIN R U-500 KWIKPEN) 500 UNIT/ML SOPN concentrated injection pen 300-350 units TID,  Pt will need 27 boxes  She using sliding scale Max 1150 units of U-500 per day 54 each 1    cyclobenzaprine (FLEXERIL) 10 MG tablet TAKE 1 TABLET BY MOUTH THREE TIMES DAILY AS NEEDED FOR MUSCLE SPASMS 30 tablet 5    Insulin Pen Needle 32G X 4 MM MISC 1 each by Does not apply route 3 times daily 100 each 3    losartan (COZAAR) 100 MG tablet TAKE 1 TABLET DAILY 90 tablet 3    verapamil (CALAN SR) 240 MG extended release tablet TAKE 1 TABLET NIGHTLY 90 tablet 3    blood glucose test strips (TRUE METRIX BLOOD GLUCOSE TEST) strip 1 each by In Vitro route 3 times daily As needed. 300 each 3    blood glucose test strips (EXACTECH TEST) strip 4 times daily.  100 strip 6    betamethasone dipropionate (DIPROLENE) 0.05 % ointment Apply topically 2 times daily. 90 g 3    Continuous Blood Gluc Sensor (FREESTYLE MING 14 DAY SENSOR) MISC 1 Units by Does not apply route every 14 days 2 each 5    Continuous Blood Gluc  (FREESTYLE MING 14 DAY READER) YOANDY 1 Units by Does not apply route daily 1 each 0    ASPIRIN 81 PO Take by mouth      furosemide (LASIX) 20 MG tablet 2 daily until edema is improved then 1 daily 60 tablet 3    busPIRone (BUSPAR) 15 MG tablet TAKE 1 TABLET THREE TIMES A DAY AS NEEDED FOR ANXIETY 90 tablet 11    FLUoxetine (PROZAC) 40 MG capsule 1 daily 90 capsule 3    gabapentin (NEURONTIN) 100 MG capsule Take up to 4 caps at bedtime and 1 tid during waking hours 630 capsule 3    diclofenac sodium 1 % GEL Apply 4 g topically 4 times daily 3 Tube 5    benzonatate (TESSALON) 100 MG capsule TAKE 1 CAPSULE BY MOUTH TWICE DAILY AS NEEDED FOR COUGH (Patient not taking: Reported on 1/16/2023)      Semaglutide,0.25 or 0.5MG/DOS, (OZEMPIC, 0.25 OR 0.5 MG/DOSE,) 2 MG/1.5ML SOPN 0.25mg SC weekly for 4 weeks and then 0.5mg SC weekly 6 pen 3     No current facility-administered medications for this visit.        Objective     Vitals:    01/16/23 1559   BP: 130/80   Pulse: 81   Temp: 97.2 °F (36.2 °C)   SpO2: 97%        Lab Results   Component Value Date    WBC 8.2 11/30/2021    HGB 15.0 11/30/2021    HCT 45.0 11/30/2021    MCV 91.6 11/30/2021     11/30/2021      Lab Results   Component Value Date     03/25/2022    K 3.7 03/25/2022     03/25/2022    CO2 28 03/25/2022    BUN 10 03/25/2022    CREATININE 0.7 03/25/2022    GLUCOSE 213 (H) 03/25/2022    CALCIUM 9.6 03/25/2022    PROT 8.2 11/30/2021    LABALBU 4.5 11/30/2021    BILITOT 0.4 11/30/2021    ALKPHOS 158 (H) 11/30/2021    AST 16 11/30/2021    ALT 15 11/30/2021    LABGLOM >60 03/25/2022    GFRAA >60 03/25/2022    AGRATIO 1.2 11/30/2021    GLOB 3.4 02/26/2021     Lab Results   Component Value Date    CHOL 118 11/30/2021    CHOL 124 03/14/2020    CHOL 161 01/15/2019 Lab Results   Component Value Date    TRIG 75 11/30/2021    TRIG 54 03/14/2020    TRIG 75 01/15/2019     Lab Results   Component Value Date    HDL 54 11/30/2021    HDL 55 03/14/2020    HDL 63 (H) 01/15/2019     Lab Results   Component Value Date    LDLCALC 49 11/30/2021    LDLCALC 58 03/14/2020    LDLCALC 83 01/15/2019     Lab Results   Component Value Date    LABVLDL 15 11/30/2021    LABVLDL 11 03/14/2020    LABVLDL 15 01/15/2019     No results found for: [facility]  Lab Results   Component Value Date    LABA1C 12.6 07/25/2022     Lab Results   Component Value Date    .3 06/06/2022           Physical Exam  Vitals and nursing note reviewed. Constitutional:       General: She is not in acute distress. Appearance: Normal appearance. She is well-developed. She is obese. She is not diaphoretic. HENT:      Head: Normocephalic and atraumatic. Eyes:      General: No scleral icterus. Conjunctiva/sclera: Conjunctivae normal.      Pupils: Pupils are equal, round, and reactive to light. Cardiovascular:      Rate and Rhythm: Normal rate and regular rhythm. Pulses: Normal pulses. Heart sounds: Normal heart sounds. No murmur heard. No friction rub. No gallop. Pulmonary:      Effort: Pulmonary effort is normal. No respiratory distress. Breath sounds: Normal breath sounds. No wheezing or rales. Chest:      Chest wall: No tenderness. Abdominal:      General: Bowel sounds are normal. There is no distension. Palpations: Abdomen is soft. Tenderness: There is no abdominal tenderness. There is no guarding. Musculoskeletal:         General: No tenderness or deformity. Normal range of motion. Cervical back: Normal range of motion and neck supple. Skin:     General: Skin is warm and dry. Neurological:      Mental Status: She is alert and oriented to person, place, and time. Cranial Nerves: No cranial nerve deficit. Sensory: No sensory deficit.    Psychiatric: Behavior: Behavior normal.            Please note that this chart was generated using dragon dictation software. Although every effort was made to ensure the accuracy of this automated transcription, some errors in transcription may have occurred.        --Adriana Baez MD

## 2023-01-16 NOTE — ASSESSMENT & PLAN NOTE
Sees Dr. Severo Paris continuous glucose monitoring  Last hemoglobin A1c was 12.6%, pending repeat A1c today  We will repeat lab work along with microalbumin creatinine ratio

## 2023-01-17 ENCOUNTER — PATIENT MESSAGE (OUTPATIENT)
Dept: ENDOCRINOLOGY | Age: 68
End: 2023-01-17

## 2023-01-17 DIAGNOSIS — E11.65 UNCONTROLLED TYPE 2 DIABETES MELLITUS WITH HYPERGLYCEMIA (HCC): ICD-10-CM

## 2023-01-17 DIAGNOSIS — R74.8 ELEVATED ALKALINE PHOSPHATASE LEVEL: ICD-10-CM

## 2023-01-17 LAB
ESTIMATED AVERAGE GLUCOSE: 269 MG/DL
GAMMA GLUTAMYL TRANSFERASE: 12 U/L (ref 5–36)
HBA1C MFR BLD: 11 %

## 2023-01-19 RX ORDER — SEMAGLUTIDE 1.34 MG/ML
INJECTION, SOLUTION SUBCUTANEOUS
Qty: 9 ML | Refills: 1 | Status: SHIPPED | OUTPATIENT
Start: 2023-01-19

## 2023-01-19 NOTE — TELEPHONE ENCOUNTER
From: Christiano Handing  Sent: 1/19/2023 10:45 AM EST  To: [facility]  Subject: Alternatives to Ozempic    Hi! I have a new mail-in pharmacy. You may want to try them. It is [facility]. Their phone number is [phone]. It was effective 1/1/23. There is a copy of my card in [address] I visited my PCP on Monday and provided the new insurance card. Any questions, feel free to mychart me a message. Thank You!

## 2023-01-30 ENCOUNTER — TELEMEDICINE (OUTPATIENT)
Dept: ENDOCRINOLOGY | Age: 68
End: 2023-01-30
Payer: MEDICARE

## 2023-01-30 DIAGNOSIS — E11.65 UNCONTROLLED TYPE 2 DIABETES MELLITUS WITH HYPERGLYCEMIA (HCC): Primary | ICD-10-CM

## 2023-01-30 PROCEDURE — G8427 DOCREV CUR MEDS BY ELIG CLIN: HCPCS | Performed by: INTERNAL MEDICINE

## 2023-01-30 PROCEDURE — G8399 PT W/DXA RESULTS DOCUMENT: HCPCS | Performed by: INTERNAL MEDICINE

## 2023-01-30 PROCEDURE — 1123F ACP DISCUSS/DSCN MKR DOCD: CPT | Performed by: INTERNAL MEDICINE

## 2023-01-30 PROCEDURE — 3017F COLORECTAL CA SCREEN DOC REV: CPT | Performed by: INTERNAL MEDICINE

## 2023-01-30 PROCEDURE — 1090F PRES/ABSN URINE INCON ASSESS: CPT | Performed by: INTERNAL MEDICINE

## 2023-01-30 PROCEDURE — 3046F HEMOGLOBIN A1C LEVEL >9.0%: CPT | Performed by: INTERNAL MEDICINE

## 2023-01-30 PROCEDURE — 2022F DILAT RTA XM EVC RTNOPTHY: CPT | Performed by: INTERNAL MEDICINE

## 2023-01-30 PROCEDURE — 99214 OFFICE O/P EST MOD 30 MIN: CPT | Performed by: INTERNAL MEDICINE

## 2023-01-30 RX ORDER — AMOXICILLIN 500 MG/1
CAPSULE ORAL
COMMUNITY
Start: 2023-01-26

## 2023-01-30 RX ORDER — TRAMADOL HYDROCHLORIDE 50 MG/1
TABLET ORAL
COMMUNITY
Start: 2023-01-26

## 2023-01-30 RX ORDER — HYDROCODONE BITARTRATE AND ACETAMINOPHEN 5; 325 MG/1; MG/1
TABLET ORAL
COMMUNITY
Start: 2022-11-23

## 2023-01-30 NOTE — PROGRESS NOTES
Seen as f/u patient for diabetes      Patient was evaluated through a synchronous (real-time) audio-video  encounter. The patient (or guardian if applicable) is aware that this is a billable service, which includes applicable co-pays. This Virtual Visit was  conducted with patient's (and/or legal guardian's) consent. The visit was conducted pursuant to the emergency declaration under the [address] [place] [address] and the [employer] General Act. Patient identification was verified,  and a caregiver was present when appropriate. The patient was located in a state where the provider was licensed to provide care.   Patient Location Home  Provider Home    Interm:    States has respiratory infection  VV visit  No steroids  May take insulin after meals    Not using CGM for a month as had fall off    Diagnosed with Type 2 diabetes mellitus 8 years ago  Known diabetic complications: Retinopathy NPDR    Current diabetic medications   U-500  270/270/270  SSI 5 for 50 > 150  Ozempic  Jardiance 10mg    Previous:    U-500 70/80/70  But taking only with lunch n dinner   SSI 5 for 96>618  Trulicity    Moderate, uncontrolled    Previous:    Lantus 70/70    But missing second shot 3 days a week   But doing 66/66  Humalog 38 units AC TID, taking it 2/week    But doing 34   SSI 2 for 50>150  She has relative hypoglycemia in the 70s    H/o use of lantus, novolog, victoza, januvia    Metformin and ER caused GI symptoms    Last A1c  11%<-----12.3%<---- 9.5%<----- 9.2%<----10.5%<-----9.3%<------9.9%<-----9.2%<------10.2 on 5/17<--- 8.4 on 12/16<-----7.8 on 6/16<------ 9.6 on 2/16<---- 10.5 on 10/15<-----11.8 on 8/15<---14.2 <--- 10.6 <--- 9.4    Prior visit with dietician: Yes   Current diet: on average, 3 meals per day does not follow low CHO  Current exercise: walking   Current monitoring regimen: home blood tests   1-2 times per day  Has brought blood glucose log/meter:   Home blood sugar records: 132-200s      Any episodes of hypoglycemia? 44    No Hx of CAD , PVD, CVA    Hyperlipidemia:controlled on statin LDL 51 on   Simvastatin 40mg  LDL 58 on 3/20  LDL 49 on   Last eye exam:   Last foot exam:   Last microalbumin to creatinine ratio:3/22    HTN: On multiple medications, taking toprol 50mg?  Losartan 100mg verapamil 240mg Chlorthalidone 25mg clonidine 0.1mg TID  Uncontrolled    She has thyroid nodules, not on medication    Past Medical History:   Diagnosis Date    Anxiety and depression     Asthma     Edema     GERD (gastroesophageal reflux disease)     Hyperlipidemia     Hypertension     IBS (irritable bowel syndrome)     Irregular menstrual bleeding 2011    Dr. Law     Morbid obesity (HCC)     Multinodular goiter 2010    Personal history of colonic polyps     Dr. Allison- due for repeat colonoscopy     Positive PPD, treated     INH completed 2001    S/P cholecystectomy 2011    Type II or unspecified type diabetes mellitus without mention of complication, not stated as uncontrolled      Past Surgical History:   Procedure Laterality Date     SECTION N/A 30 plus years ago    x2    CHOLECYSTECTOMY      3/2009    COLONOSCOPY      2012 Dr. Missael Allison-jamie 5 yrs    COLONOSCOPY      repeat x 3 yrs HAD 5 POLPYS    HYSTEROSCOPY  2015    HYSTEROSCOPY, DILATATION AND CURETTAGE WITH NOVASURE ABLATION AND ABLATION    MYOMECTOMY      2003    TIBIA FRACTURE SURGERY      2006 w/ revision     Current Outpatient Medications   Medication Sig Dispense Refill    cloNIDine (CATAPRES) 0.1 MG tablet Take 1 tablet by mouth every 8 hours 270 tablet 3    diclofenac sodium 1 % GEL Apply 4 g topically 4 times daily 3 Tube 5    buPROPion (WELLBUTRIN XL) 300 MG extended release tablet Take 1 tablet by mouth every morning 90 tablet 3    chlorthalidone (HYGROTON) 25 MG tablet Take 1 tablet by mouth daily 30 tablet 3     busPIRone (BUSPAR) 15 MG tablet Take 15 mg by mouth 3 times daily as needed (anxiety) 90 tablet 3    atorvastatin (LIPITOR) 40 MG tablet TAKE 1 TABLET DAILY 90 tablet 3    FLUoxetine (PROZAC) 40 MG capsule TAKE 1 CAPSULE DAILY 90 capsule 3    Dulaglutide (TRULICITY) 9.37 TO/0.2BW SOPN 0.75 mg weekly 12 pen 3    insulin regular human (HUMULIN R U-500 KWIKPEN) 500 UNIT/ML SOPN concentrated injection pen  units before breakfast and dinner 18 pen 1    betamethasone dipropionate (DIPROLENE) 0.05 % ointment APPLY TOPICALLY DAILY 90 g 3    verapamil (CALAN SR) 240 MG extended release tablet TAKE 1 TABLET NIGHTLY 90 tablet 3    metoprolol succinate (TOPROL XL) 50 MG extended release tablet Take 1 tablet by mouth nightly 30 tablet 3    losartan (COZAAR) 100 MG tablet TAKE 1 TABLET DAILY 90 tablet 4    montelukast (SINGULAIR) 10 MG tablet Take 1 tablet by mouth nightly 90 tablet 3    Insulin Pen Needle 32G X 4 MM MISC 1 each by Does not apply route daily 100 each 3    loratadine (CLARITIN) 10 MG tablet Take 10 mg by mouth daily      ONE TOUCH LANCETS MISC 1 each by Does not apply route daily 100 each 3     No current facility-administered medications for this visit. Review of Systems    Constitutional: Negative for weight loss and malaise/fatigue. Negative for fever and chills. HENT: Negative for hearing loss, ear pain, nosebleeds, neck pain and tinnitus. Eyes: Negative for blurred vision. Negative for double vision, photophobia and pain. Respiratory: Negative for cough and sputum production. Cardiovascular: Negative for chest pain, palpitations and leg swelling. Gastrointestinal: Negative for nausea, vomiting and abdominal pain. Genitourinary: Negative for dysuria, urgency and frequency. Musculoskeletal: Negative for back pain. No joint pain  Skin: Negative for itching and rash. Neurological: Negative for dizziness. Negative for tingling, tremors, focal weakness and + headaches. Endo/Heme/Allergies: see HPI  Psychiatric/Behavioral: Negative for depression and substance abuse. +fibromyalgia        PHYSICAL EXAMINATION:  [ INSTRUCTIONS:  \"[x]\" Indicates a positive item  \"[]\" Indicates a negative item  -- DELETE ALL ITEMS NOT EXAMINED]  Vital Signs: (As obtained by patient/caregiver or practitioner observation)    Blood pressure-  Heart rate-    Respiratory rate-    Temperature-  Pulse oximetry-     Constitutional Appears well-developed and well-nourished No apparent distress        Mental status  Alert and awake  Oriented to person/place/time  Able to follow commands      Eyes:  EOM    [x]  Normal    Sclera  [x]  Normal           Discharge [x]  None visible      HENT:   [x] Normocephalic, atraumatic. [x] Mouth/Throat: Mucous membranes are moist.     External Ears [x] Normal  no discharge    Neck: [x] No visualized mass  no swelling    Pulmonary/Chest: [x] Respiratory effort normal.  [x] No visualized signs of difficulty breathing or respiratory distress             Musculoskeletal:   [x] Normal gait with no signs of ataxia         [x] Normal range of motion of neck          Head and neck stable, appears normal ROM, strength good    Neurological:        [x] No Facial Asymmetry (Cranial nerve 7 motor function) (limited exam to video visit)          [x] No gaze palsy                Skin:        [x] No significant exanthematous lesions or discoloration noted on facial skin                 Psychiatric:       [x] Normal Affect [x] No Hallucinations            Other pertinent observable physical exam findings-     Due to this being a TeleHealth encounter, evaluation of the following organ systems is limited: Vitals/Constitutional/EENT/Resp/CV/GI//MS/Neuro/Skin/Heme-Lymph-Imm. Services were provided through a video synchronous discussion virtually to substitute for in-person clinic visit.          5/21  Skeletal foot exam is normal, no skin lesions, toenails are normal,10 g monofilament is 10/10 on the right and 10/10 on the left  Right sole callus/thickening, no active discharge, no redness    Lab Reviewed   No components found for: [facility]  Lab Results   Component Value Date    TRIG 54 03/14/2020    TRIG 75 01/15/2019    TRIG 60 09/21/2017     Lab Results   Component Value Date    HDL 55 03/14/2020    HDL 63 (H) 01/15/2019    HDL 66 (H) 09/21/2017     Lab Results   Component Value Date    LDLCALC 58 03/14/2020    LDLCALC 83 01/15/2019    LDLCALC 51 09/21/2017     Lab Results   Component Value Date    LABVLDL 11 03/14/2020    LABVLDL 15 01/15/2019    LABVLDL 12 09/21/2017     Lab Results   Component Value Date    LABA1C 10.5 12/31/2019       Assessment:     Ezequiel Alejandro is a 62 y.o. female with :    1.T2DM: Longstanding, uncontrolled. Discussed goals, provided education. Given her A1c and hyperglycemia,switched back to U-500 , discussed risk of lows, needs self monitoring. Advised to take insulin 30 min before the meal.  Has hyperglycemia,stressed importance of taking all three doses. 20-30 gm CHO with meal  Reviewed log, adjust insulin dose. Her 14 day average is better. Slight change in dose given some hypoglycemia. Mounjaro not covered, on ozempic  2. HTN. Gianna Ira Aldosterone and renin nl, lab did not check MN. May need aldactone  3. HLD: LDL at goal, on statin  4. Obesity: Discussed weight loss, refer to bariatrics  5. Depression  6. MNG: Per history-h/o FNA, reports FNA was benign years ago, will need USG, did not have done  7. ADELINA  8. Vitamin D deficiency    Plan:      U-500  280 units before meals   SSI   ozempic   jardiance   Advised to rotate site   Advise to check blood sugar 4 times a day   Patient to send blood sugar log for titration, weekly   Refer to diabetes education. Advise to low simple carbohydrate and protein with each  meal diet. Diabetes Care: recommend yearly eye exam, foot exam and urine microalbumin to   creatinine ratio.       -Hyperlipidemia, LDL goal is <100 mg/dl -Hypertension; Continue same  -Daily ASA: Added 81mg   -Smoking status: Non smoker  Advised to send log every week  Discussed patient can see her every 1-2 weeks until controlled, states difficult due to job.    Advised of evening support group

## 2023-02-03 DIAGNOSIS — L65.9 HAIR LOSS: Primary | ICD-10-CM

## 2023-03-22 ENCOUNTER — OFFICE VISIT (OUTPATIENT)
Dept: INTERNAL MEDICINE CLINIC | Age: 68
End: 2023-03-22
Payer: MEDICARE

## 2023-03-22 VITALS
HEIGHT: 66 IN | HEART RATE: 76 BPM | DIASTOLIC BLOOD PRESSURE: 90 MMHG | TEMPERATURE: 97.7 F | WEIGHT: 293 LBS | OXYGEN SATURATION: 96 % | SYSTOLIC BLOOD PRESSURE: 140 MMHG | BODY MASS INDEX: 47.09 KG/M2

## 2023-03-22 DIAGNOSIS — M25.512 CHRONIC LEFT SHOULDER PAIN: Primary | ICD-10-CM

## 2023-03-22 DIAGNOSIS — E11.65 UNCONTROLLED TYPE 2 DIABETES MELLITUS WITH HYPERGLYCEMIA (HCC): ICD-10-CM

## 2023-03-22 DIAGNOSIS — G89.29 CHRONIC LEFT SHOULDER PAIN: Primary | ICD-10-CM

## 2023-03-22 DIAGNOSIS — G89.29 CHRONIC PAIN OF BOTH HIPS: ICD-10-CM

## 2023-03-22 DIAGNOSIS — M25.551 CHRONIC PAIN OF BOTH HIPS: ICD-10-CM

## 2023-03-22 DIAGNOSIS — M25.552 CHRONIC PAIN OF BOTH HIPS: ICD-10-CM

## 2023-03-22 PROCEDURE — 1090F PRES/ABSN URINE INCON ASSESS: CPT | Performed by: STUDENT IN AN ORGANIZED HEALTH CARE EDUCATION/TRAINING PROGRAM

## 2023-03-22 PROCEDURE — G8417 CALC BMI ABV UP PARAM F/U: HCPCS | Performed by: STUDENT IN AN ORGANIZED HEALTH CARE EDUCATION/TRAINING PROGRAM

## 2023-03-22 PROCEDURE — 3079F DIAST BP 80-89 MM HG: CPT | Performed by: STUDENT IN AN ORGANIZED HEALTH CARE EDUCATION/TRAINING PROGRAM

## 2023-03-22 PROCEDURE — 2022F DILAT RTA XM EVC RTNOPTHY: CPT | Performed by: STUDENT IN AN ORGANIZED HEALTH CARE EDUCATION/TRAINING PROGRAM

## 2023-03-22 PROCEDURE — 99213 OFFICE O/P EST LOW 20 MIN: CPT | Performed by: STUDENT IN AN ORGANIZED HEALTH CARE EDUCATION/TRAINING PROGRAM

## 2023-03-22 PROCEDURE — G8484 FLU IMMUNIZE NO ADMIN: HCPCS | Performed by: STUDENT IN AN ORGANIZED HEALTH CARE EDUCATION/TRAINING PROGRAM

## 2023-03-22 PROCEDURE — 3046F HEMOGLOBIN A1C LEVEL >9.0%: CPT | Performed by: STUDENT IN AN ORGANIZED HEALTH CARE EDUCATION/TRAINING PROGRAM

## 2023-03-22 PROCEDURE — 3017F COLORECTAL CA SCREEN DOC REV: CPT | Performed by: STUDENT IN AN ORGANIZED HEALTH CARE EDUCATION/TRAINING PROGRAM

## 2023-03-22 PROCEDURE — 3077F SYST BP >= 140 MM HG: CPT | Performed by: STUDENT IN AN ORGANIZED HEALTH CARE EDUCATION/TRAINING PROGRAM

## 2023-03-22 PROCEDURE — 1123F ACP DISCUSS/DSCN MKR DOCD: CPT | Performed by: STUDENT IN AN ORGANIZED HEALTH CARE EDUCATION/TRAINING PROGRAM

## 2023-03-22 PROCEDURE — 1036F TOBACCO NON-USER: CPT | Performed by: STUDENT IN AN ORGANIZED HEALTH CARE EDUCATION/TRAINING PROGRAM

## 2023-03-22 PROCEDURE — G8399 PT W/DXA RESULTS DOCUMENT: HCPCS | Performed by: STUDENT IN AN ORGANIZED HEALTH CARE EDUCATION/TRAINING PROGRAM

## 2023-03-22 PROCEDURE — G8427 DOCREV CUR MEDS BY ELIG CLIN: HCPCS | Performed by: STUDENT IN AN ORGANIZED HEALTH CARE EDUCATION/TRAINING PROGRAM

## 2023-03-22 SDOH — ECONOMIC STABILITY: FOOD INSECURITY: WITHIN THE PAST 12 MONTHS, YOU WORRIED THAT YOUR FOOD WOULD RUN OUT BEFORE YOU GOT MONEY TO BUY MORE.: NEVER TRUE

## 2023-03-22 SDOH — ECONOMIC STABILITY: INCOME INSECURITY: HOW HARD IS IT FOR YOU TO PAY FOR THE VERY BASICS LIKE FOOD, HOUSING, MEDICAL CARE, AND HEATING?: NOT HARD AT ALL

## 2023-03-22 SDOH — ECONOMIC STABILITY: FOOD INSECURITY: WITHIN THE PAST 12 MONTHS, THE FOOD YOU BOUGHT JUST DIDN'T LAST AND YOU DIDN'T HAVE MONEY TO GET MORE.: NEVER TRUE

## 2023-03-22 SDOH — ECONOMIC STABILITY: HOUSING INSECURITY
IN THE LAST 12 MONTHS, WAS THERE A TIME WHEN YOU DID NOT HAVE A STEADY PLACE TO SLEEP OR SLEPT IN A SHELTER (INCLUDING NOW)?: NO

## 2023-03-22 ASSESSMENT — PATIENT HEALTH QUESTIONNAIRE - PHQ9
7. TROUBLE CONCENTRATING ON THINGS, SUCH AS READING THE NEWSPAPER OR WATCHING TELEVISION: 0
5. POOR APPETITE OR OVEREATING: 0
2. FEELING DOWN, DEPRESSED OR HOPELESS: 1
9. THOUGHTS THAT YOU WOULD BE BETTER OFF DEAD, OR OF HURTING YOURSELF: 0
4. FEELING TIRED OR HAVING LITTLE ENERGY: 0
8. MOVING OR SPEAKING SO SLOWLY THAT OTHER PEOPLE COULD HAVE NOTICED. OR THE OPPOSITE, BEING SO FIGETY OR RESTLESS THAT YOU HAVE BEEN MOVING AROUND A LOT MORE THAN USUAL: 0
SUM OF ALL RESPONSES TO PHQ QUESTIONS 1-9: 1
1. LITTLE INTEREST OR PLEASURE IN DOING THINGS: 0
SUM OF ALL RESPONSES TO PHQ QUESTIONS 1-9: 0
SUM OF ALL RESPONSES TO PHQ QUESTIONS 1-9: 0
SUM OF ALL RESPONSES TO PHQ QUESTIONS 1-9: 1
SUM OF ALL RESPONSES TO PHQ9 QUESTIONS 1 & 2: 0
SUM OF ALL RESPONSES TO PHQ QUESTIONS 1-9: 1
10. IF YOU CHECKED OFF ANY PROBLEMS, HOW DIFFICULT HAVE THESE PROBLEMS MADE IT FOR YOU TO DO YOUR WORK, TAKE CARE OF THINGS AT HOME, OR GET ALONG WITH OTHER PEOPLE: 0
6. FEELING BAD ABOUT YOURSELF - OR THAT YOU ARE A FAILURE OR HAVE LET YOURSELF OR YOUR FAMILY DOWN: 0
3. TROUBLE FALLING OR STAYING ASLEEP: 0
SUM OF ALL RESPONSES TO PHQ QUESTIONS 1-9: 0
SUM OF ALL RESPONSES TO PHQ QUESTIONS 1-9: 1
SUM OF ALL RESPONSES TO PHQ QUESTIONS 1-9: 0
2. FEELING DOWN, DEPRESSED OR HOPELESS: 0

## 2023-03-22 ASSESSMENT — ENCOUNTER SYMPTOMS
STRIDOR: 0
NAUSEA: 0
DIARRHEA: 0
COUGH: 0
SHORTNESS OF BREATH: 0
VOICE CHANGE: 0
ABDOMINAL PAIN: 0
ABDOMINAL DISTENTION: 0
CONSTIPATION: 0
APNEA: 0
CHEST TIGHTNESS: 0
TROUBLE SWALLOWING: 0
PHOTOPHOBIA: 0
CHOKING: 0
WHEEZING: 0
VOMITING: 0

## 2023-03-22 NOTE — PROGRESS NOTES
weakness and light-headedness. Psychiatric/Behavioral:  Negative for agitation and behavioral problems. Current Outpatient Medications   Medication Sig Dispense Refill    diclofenac sodium (VOLTAREN) 1 % GEL Apply 2 g topically 4 times daily as needed for Pain 240 g 3    amoxicillin (AMOXIL) 500 MG capsule TAKE ONE CAPSULE BY MOUTH THREE TIMES DAILY FOR ONE WEEK. Semaglutide, 1 MG/DOSE, (OZEMPIC, 1 MG/DOSE,) 4 MG/3ML SOPN INJECT 1 MG SUBCUTANEOUS WEEKLY 9 mL 1    atorvastatin (LIPITOR) 40 MG tablet TAKE 1 TABLET DAILY 90 tablet 1    chlorthalidone (HYGROTON) 25 MG tablet Take 1 tablet by mouth daily TAKE 1 TABLET DAILY 90 tablet 1    zoster recombinant adjuvanted vaccine (SHINGRIX) 50 MCG/0.5ML SUSR injection Inject 0.5 mLs into the muscle See Admin Instructions 1 dose now and repeat in 2-6 months 0.5 mL 0    empagliflozin (JARDIANCE) 10 MG tablet Take 1 tablet by mouth daily 90 tablet 1    albuterol sulfate HFA (VENTOLIN HFA) 108 (90 Base) MCG/ACT inhaler Inhale 2 puffs into the lungs 4 times daily as needed for Wheezing 18 g 0    insulin regular human (HUMULIN R U-500 KWIKPEN) 500 UNIT/ML SOPN concentrated injection pen 300-350 units TID,  Pt will need 27 boxes  She using sliding scale Max 1150 units of U-500 per day (Patient taking differently: Up to 270 units TID,  Pt will need 27 boxes  She using sliding scale Max 1150 units of U-500 per day) 54 each 1    cyclobenzaprine (FLEXERIL) 10 MG tablet TAKE 1 TABLET BY MOUTH THREE TIMES DAILY AS NEEDED FOR MUSCLE SPASMS 30 tablet 5    Insulin Pen Needle 32G X 4 MM MISC 1 each by Does not apply route 3 times daily 100 each 3    losartan (COZAAR) 100 MG tablet TAKE 1 TABLET DAILY 90 tablet 3    verapamil (CALAN SR) 240 MG extended release tablet TAKE 1 TABLET NIGHTLY 90 tablet 3    blood glucose test strips (TRUE METRIX BLOOD GLUCOSE TEST) strip 1 each by In Vitro route 3 times daily As needed.  300 each 3    blood glucose test strips (EXACTECH TEST) strip

## 2023-04-01 ENCOUNTER — HOSPITAL ENCOUNTER (OUTPATIENT)
Dept: MAMMOGRAPHY | Age: 68
Discharge: HOME | End: 2023-04-01
Payer: MEDICARE

## 2023-04-01 VITALS — HEIGHT: 67 IN | WEIGHT: 293 LBS | BODY MASS INDEX: 45.99 KG/M2

## 2023-04-01 DIAGNOSIS — Z12.31 SCREENING MAMMOGRAM FOR BREAST CANCER: ICD-10-CM

## 2023-04-01 PROCEDURE — 77067 SCR MAMMO BI INCL CAD: CPT

## 2023-04-28 ENCOUNTER — HOSPITAL ENCOUNTER (OUTPATIENT)
Dept: MAMMOGRAPHY | Age: 68
Discharge: HOME | End: 2023-04-28
Payer: MEDICARE

## 2023-04-28 DIAGNOSIS — R92.8 ABNORMAL MAMMOGRAM: ICD-10-CM

## 2023-04-28 PROCEDURE — 2709999900 MAM STEREO BREAST BX W LOC DEVICE 1ST LESION RIGHT

## 2023-05-03 RX ORDER — FLUOXETINE HYDROCHLORIDE 40 MG/1
CAPSULE ORAL
Qty: 90 CAPSULE | Refills: 3 | OUTPATIENT
Start: 2023-05-03

## 2023-05-03 NOTE — TELEPHONE ENCOUNTER
Medication:   Requested Prescriptions     Pending Prescriptions Disp Refills    FLUoxetine (PROZAC) 40 MG capsule 90 capsule 3     Si daily    empagliflozin (JARDIANCE) 10 MG tablet 90 tablet 1     Sig: Take 1 tablet by mouth daily       Last Filled:      Patient Phone Number: [phone] (home)     Last appt: 2023   Next appt: 2023    Last Labs DM:   Lab Results   Component Value Date/Time    LABA1C 11.0 2023 04:35 PM     Last Lipid:   Lab Results   Component Value Date/Time    CHOL 153 2023 04:35 PM    TRIG 70 2023 04:35 PM    HDL 65 2023 04:35 PM    HDL 61 2010 12:02 PM    [address] 74 2023 04:35 PM     Last PSA: No results found for: PSA  Last Thyroid:   Lab Results   Component Value Date/Time    TSH 1.57 2021 10:10 AM    T4FREE 1.4 2015 09:59 AM

## 2023-05-04 ENCOUNTER — ANESTHESIA EVENT (OUTPATIENT)
Dept: ENDOSCOPY | Age: 68
End: 2023-05-04
Payer: MEDICARE

## 2023-05-05 ENCOUNTER — ANESTHESIA (OUTPATIENT)
Dept: ENDOSCOPY | Age: 68
End: 2023-05-05
Payer: MEDICARE

## 2023-05-05 ENCOUNTER — HOSPITAL ENCOUNTER (OUTPATIENT)
Age: 68
Setting detail: OUTPATIENT SURGERY
Discharge: HOME | End: 2023-05-05
Attending: INTERNAL MEDICINE | Admitting: INTERNAL MEDICINE
Payer: MEDICARE

## 2023-05-05 VITALS
OXYGEN SATURATION: 100 % | HEIGHT: 67 IN | BODY MASS INDEX: 45.99 KG/M2 | DIASTOLIC BLOOD PRESSURE: 65 MMHG | WEIGHT: 293 LBS | RESPIRATION RATE: 18 BRPM | SYSTOLIC BLOOD PRESSURE: 124 MMHG | TEMPERATURE: 96.6 F | HEART RATE: 80 BPM

## 2023-05-05 DIAGNOSIS — Z86.010 HISTORY OF COLON POLYPS: ICD-10-CM

## 2023-05-05 LAB
GLUCOSE BLD-MCNC: 189 MG/DL (ref 70–99)
PERFORMED ON: ABNORMAL

## 2023-05-05 PROCEDURE — 7100000011 HC PHASE II RECOVERY - ADDTL 15 MIN: Performed by: INTERNAL MEDICINE

## 2023-05-05 PROCEDURE — 7100000010 HC PHASE II RECOVERY - FIRST 15 MIN: Performed by: INTERNAL MEDICINE

## 2023-05-05 PROCEDURE — 2500000003 HC RX 250 WO HCPCS: Performed by: STUDENT IN AN ORGANIZED HEALTH CARE EDUCATION/TRAINING PROGRAM

## 2023-05-05 PROCEDURE — 88305 TISSUE EXAM BY PATHOLOGIST: CPT

## 2023-05-05 PROCEDURE — 2580000003 HC RX 258: Performed by: ANESTHESIOLOGY

## 2023-05-05 PROCEDURE — 2709999900 HC NON-CHARGEABLE SUPPLY: Performed by: INTERNAL MEDICINE

## 2023-05-05 PROCEDURE — 6360000002 HC RX W HCPCS: Performed by: STUDENT IN AN ORGANIZED HEALTH CARE EDUCATION/TRAINING PROGRAM

## 2023-05-05 PROCEDURE — 3609010200 HC COLONOSCOPY ABLATION TUMOR POLYP/OTHER LES: Performed by: INTERNAL MEDICINE

## 2023-05-05 PROCEDURE — 3700000001 HC ADD 15 MINUTES (ANESTHESIA): Performed by: INTERNAL MEDICINE

## 2023-05-05 PROCEDURE — 3700000000 HC ANESTHESIA ATTENDED CARE: Performed by: INTERNAL MEDICINE

## 2023-05-05 PROCEDURE — 2720000010 HC SURG SUPPLY STERILE: Performed by: INTERNAL MEDICINE

## 2023-05-05 RX ORDER — PROPOFOL 10 MG/ML
INJECTION, EMULSION INTRAVENOUS PRN
Status: DISCONTINUED | OUTPATIENT
Start: 2023-05-05 | End: 2023-05-05 | Stop reason: SDUPTHER

## 2023-05-05 RX ORDER — LIDOCAINE HYDROCHLORIDE 20 MG/ML
INJECTION, SOLUTION INFILTRATION; PERINEURAL PRN
Status: DISCONTINUED | OUTPATIENT
Start: 2023-05-05 | End: 2023-05-05 | Stop reason: SDUPTHER

## 2023-05-05 RX ORDER — SODIUM CHLORIDE, SODIUM LACTATE, POTASSIUM CHLORIDE, CALCIUM CHLORIDE 600; 310; 30; 20 MG/100ML; MG/100ML; MG/100ML; MG/100ML
INJECTION, SOLUTION INTRAVENOUS CONTINUOUS
Status: DISCONTINUED | OUTPATIENT
Start: 2023-05-05 | End: 2023-05-05 | Stop reason: HOSPADM

## 2023-05-05 RX ORDER — PROPOFOL 10 MG/ML
INJECTION, EMULSION INTRAVENOUS CONTINUOUS PRN
Status: DISCONTINUED | OUTPATIENT
Start: 2023-05-05 | End: 2023-05-05 | Stop reason: SDUPTHER

## 2023-05-05 RX ADMIN — LIDOCAINE HYDROCHLORIDE 100 MG: 20 INJECTION, SOLUTION INFILTRATION; PERINEURAL at 10:11

## 2023-05-05 RX ADMIN — SODIUM CHLORIDE, POTASSIUM CHLORIDE, SODIUM LACTATE AND CALCIUM CHLORIDE: 600; 310; 30; 20 INJECTION, SOLUTION INTRAVENOUS at 08:47

## 2023-05-05 RX ADMIN — PROPOFOL 70 MG: 10 INJECTION, EMULSION INTRAVENOUS at 10:11

## 2023-05-05 RX ADMIN — PROPOFOL 150 MCG/KG/MIN: 10 INJECTION, EMULSION INTRAVENOUS at 10:11

## 2023-05-05 ASSESSMENT — PAIN SCALES - GENERAL
PAINLEVEL_OUTOF10: 0

## 2023-05-05 ASSESSMENT — ENCOUNTER SYMPTOMS: SHORTNESS OF BREATH: 1

## 2023-05-05 ASSESSMENT — PAIN - FUNCTIONAL ASSESSMENT: PAIN_FUNCTIONAL_ASSESSMENT: 0-10

## 2023-05-05 NOTE — ANESTHESIA POSTPROCEDURE EVALUATION
Department of Anesthesiology  Postprocedure Note    Patient: Maria L Dugan  MRN: [id number]  YOB: 1955  Date of evaluation: 5/5/2023      Procedure Summary     Date: 05/05/23 Room / Location: [facility]    Anesthesia Start: 1004 Anesthesia Stop: 1100    Procedure: COLONOSCOPY POLYPECTOMY ABLATION Diagnosis:       History of colon polyps      (History of colon polyps [Z86.010])    Surgeons: Suad Mackay MD Responsible Provider: Jazzy Christopher MD    Anesthesia Type: MAC ASA Status: 3          Anesthesia Type: No value filed.     Johann Phase I: Johann Score: 10    Johann Phase II: Johann Score: 10      Anesthesia Post Evaluation    Patient location during evaluation: PACU  Patient participation: complete - patient participated  Level of consciousness: awake  Pain score: 0  Airway patency: patent  Nausea & Vomiting: no nausea  Complications: no  Cardiovascular status: hemodynamically stable  Respiratory status: acceptable  Hydration status: stable

## 2023-05-05 NOTE — PROCEDURES
[facility]  Colonoscopy Note    Patient: Flori Renteria  : 1955  Acct#:     Procedure: Colonoscopy with polypectomy (snare cautery)    Date:  2023    Surgeon:  Scarlett Bernabe MD    Referring Physician:  Gisselle Houston MD    Anesthesia: IV propofol, per anesthesia    EBL: <50 mL    Indications: personal history colon polyps     Procedure: An informed consent was obtained from the patient after explanation of indications, benefits, possible risks and complications of the procedure. The patient was then taken to the endoscopy suite, placed in the left lateral decubitus position, and the above IV anesthesia was administered. A digital rectal examination was performed and revealed negative without mass, lesions or tenderness. The Olympus PCFQ-H190 video colonoscope was placed in the patient's rectum under digital direction and advanced to the cecum. The cecum was identified by characteristic anatomy and ballottment. The prep was adequate. Findings:  A 25 mm pedunculated polyp in the ascending colon removed via hot snare polypectomy. 5 semipedunculated and pedunculated polyps measuring 10 to 15 mm in the transverse colon removed via hot snare polypectomy. Diverticulosis. Medium internal hemorrhoids. The scope was then withdrawn into the rectum and retroflexed. The retroflexed view of the anal verge and rectum demonstrates  Medium internal hemorrhoids. The scope was straightened, the colon was decompressed and the scope was withdrawn from the patient. The patient tolerated the procedure well and was taken to the PACU in good condition. Biopsies:  yes      Impression:   A 25 mm pedunculated polyp in the ascending colon removed via hot snare polypectomy. 5 semipedunculated and pedunculated polyps measuring 10 to 15 mm in the transverse colon removed via hot snare polypectomy. Diverticulosis.   Medium internal

## 2023-05-05 NOTE — PROGRESS NOTES
Ambulatory Surgery/Procedure Discharge Note    Vitals:    05/05/23 1110   BP: 124/65   Pulse: 80   Resp: 18   Temp:    SpO2: 100%       In: 800 [I.V.:800]  Out: -     Restroom use offered before discharge. Yes    Pain assessment:  level of pain (1-10, 10 severe)  Pain Level: 0  Pt to Endoscopy recovery post Colonoscopy. Pt denies pain at this time. Pt denies nausea at this time, PO fluids refused per pt request.   Discharge instructions given to pt's  and he states understanding of these instructions. Pt and pt's  verbally instructed per Dr. Marielle Lares and this writer to resume Aspirin in 1 week. Pt and pt's  state that pt is \"ready to go. \"       Patient discharged to home/self care.  Patient discharged via wheel chair by transporter to waiting family/S.O.       5/5/2023 11:17 AM

## 2023-05-05 NOTE — ANESTHESIA PRE PROCEDURE
TID,  Pt will need 27 boxes  She using sliding scale Max 1150 units of U-500 per day  Patient taking differently: Up to 270 units TID,  Pt will need 27 boxes  She using sliding scale Max 1150 units of U-500 per day 7/25/22   Eloisa Dangelo MD   cyclobenzaprine (FLEXERIL) 10 MG tablet TAKE 1 TABLET BY MOUTH THREE TIMES DAILY AS NEEDED FOR MUSCLE SPASMS 4/4/22   Ovidio Green MD   Insulin Pen Needle 32G X 4 MM MISC 1 each by Does not apply route 3 times daily 3/8/22   Eloisa Dangelo MD   losartan (COZAAR) 100 MG tablet TAKE 1 TABLET DAILY 1/27/22   Ovidio Green MD   verapamil (CALAN SR) 240 MG extended release tablet TAKE 1 TABLET NIGHTLY 1/27/22   Ovidio Green MD   blood glucose test strips (TRUE METRIX BLOOD GLUCOSE TEST) strip 1 each by In Vitro route 3 times daily As needed. 11/29/21   Eloisa Dangelo MD   blood glucose test strips (EXACTECH TEST) strip 4 times daily. 11/29/21   Eloisa Dangelo MD   betamethasone dipropionate (DIPROLENE) 0.05 % ointment Apply topically 2 times daily.  11/23/21   Ovidio Green MD   Continuous Blood Gluc Sensor (FREESTYLE MING 14 DAY SENSOR) MISC 1 Units by Does not apply route every 14 days 10/28/21   CORNELIUS Helm CNP   Continuous Blood Gluc  (FREESTYLE MING 14 DAY READER) YOANDY 1 Units by Does not apply route daily 10/28/21   CORNELIUS Helm CNP   ASPIRIN 81 PO Take by mouth    Historical Provider, MD   furosemide (LASIX) 20 MG tablet 2 daily until edema is improved then 1 daily 7/7/21   Ovidio Green MD   busPIRone (BUSPAR) 15 MG tablet TAKE 1 TABLET THREE TIMES A DAY AS NEEDED FOR ANXIETY 6/17/21   Ovidio Green MD   FLUoxetine (PROZAC) 40 MG capsule 1 daily 5/4/21   Ovidio Green MD   gabapentin (NEURONTIN) 100 MG capsule Take up to 4 caps at bedtime and 1 tid during waking hours 3/18/21 1/16/23  Ovidio Green MD   diclofenac sodium 1 % GEL Apply 4 g topically 4 times daily 1/27/20   Ovidio Green MD       Current medications:    Current

## 2023-05-05 NOTE — DISCHARGE INSTRUCTIONS
ENDOSCOPY DISCHARGE INSTRUCTIONS:    Call the physician that did your procedure for any questions or concern:    [facility]: [phone]  DR. EVERT SIMENTAL      ACTIVITY:    There are potential side effects to the medications used for sedation and anesthesia during your procedure. These include:  Dizziness or light-headedness, confusion or memory loss, delayed reaction times, loss of coordination, nausea and vomiting. Because of your increased risk for injury, we ask that you observe the following precautions: For the next 24 hours,  DO NOT operate an automobile, bicycle, motorcycle, , power tools or large equipment of any kind. Do not drink alcohol, sign any legal documents or make any legal decisions for 24 hours. Do not bend your head over lower than your heart. DO sit on the side of bed/couch awhile before getting up. Plan on bedrest or quiet relaxation today. You may resume normal activities in 24 hours. DIET:    Your first meal today should be light, avoiding spicy and fatty foods. If you tolerate this first meal, then you may advance to your regular diet unless otherwise advised by your physician. NORMAL SYMPTOMS:  -Mild sore throat if youve had an EGD   -Gaseous discomfort    NOTIFY YOUR PHYSICIAN IF THESE SYMPTOMS OCCUR:  1. Fever (greater than 100)  5. Increased abdominal bloating  2. Severe pain    6. Excessive bleeding  3. Nausea and vomiting  7. Chest pain                                                                    4. Chills    8. Shortness of breath    ADDITIONAL INSTRUCTIONS:    Biopsy results: Call [phone],[facility] for biopsy results in 1 week    Educational Information: Resume Aspirin 81 mg in 1 week, Friday May 12, 2023          Please review these discharge instructions this evening or tomorrow for  information you may have forgotten. We want to thank you for choosing the [facility] as your health care provider.  We always strive to provide

## 2023-05-16 ENCOUNTER — TELEMEDICINE (OUTPATIENT)
Dept: ENDOCRINOLOGY | Age: 68
End: 2023-05-16
Payer: MEDICARE

## 2023-05-16 DIAGNOSIS — E11.65 UNCONTROLLED TYPE 2 DIABETES MELLITUS WITH HYPERGLYCEMIA (HCC): Primary | ICD-10-CM

## 2023-05-16 PROCEDURE — 1123F ACP DISCUSS/DSCN MKR DOCD: CPT | Performed by: INTERNAL MEDICINE

## 2023-05-16 PROCEDURE — 99214 OFFICE O/P EST MOD 30 MIN: CPT | Performed by: INTERNAL MEDICINE

## 2023-05-16 PROCEDURE — 3046F HEMOGLOBIN A1C LEVEL >9.0%: CPT | Performed by: INTERNAL MEDICINE

## 2023-05-16 RX ORDER — SEMAGLUTIDE 2.68 MG/ML
INJECTION, SOLUTION SUBCUTANEOUS
Qty: 9 ML | Refills: 3 | Status: SHIPPED | OUTPATIENT
Start: 2023-05-16

## 2023-05-16 NOTE — PROGRESS NOTES
Seen as f/u patient for diabetes      Patient was evaluated through a synchronous (real-time) audio-video  encounter. The patient (or guardian if applicable) is aware that this is a billable service, which includes applicable co-pays. This Virtual Visit was  conducted with patient's (and/or legal guardian's) consent. The visit was conducted pursuant to the emergency declaration under the [address] [place] [address] and the [employer] General Act. Patient identification was verified,  and a caregiver was present when appropriate. The patient was located in a state where the provider was licensed to provide care.   Patient Location Home  Provider Home    Interm:    VV visit  No steroids  Inquires if can be switched to humalog  May take insulin after meals  Hold insulin if glucose in 80s    Not using CGM for a month as had fall off    Diagnosed with Type 2 diabetes mellitus 8 years ago  Known diabetic complications: Retinopathy NPDR    Current diabetic medications   U-500  270/270/270  SSI 5 for 50 > 150  Ozempic 1mg  Jardiance 10mg    Previous:    U-500 70/80/70  But taking only with lunch n dinner   SSI 5 for 55>675  Trulicity    Moderate, uncontrolled    Previous:    Lantus 70/70    But missing second shot 3 days a week   But doing 66/66  Humalog 38 units AC TID, taking it 2/week    But doing 34   SSI 2 for 50>150  She has relative hypoglycemia in the 70s    H/o use of lantus, novolog, victoza, januvia    Metformin and ER caused GI symptoms    Last A1c  11%<-----12.3%<---- 9.5%<----- 9.2%<----10.5%<-----9.3%<------9.9%<-----9.2%<------10.2 on 5/17<--- 8.4 on 12/16<-----7.8 on 6/16<------ 9.6 on 2/16<---- 10.5 on 10/15<-----11.8 on 8/15<---14.2 <--- 10.6 <--- 9.4    Prior visit with dietician: Yes   Current diet: on average, 3 meals per day does not follow low CHO  Current exercise: walking   Current monitoring regimen: home blood

## 2023-05-27 ENCOUNTER — PATIENT MESSAGE (OUTPATIENT)
Dept: INTERNAL MEDICINE CLINIC | Age: 68
End: 2023-05-27

## 2023-05-30 RX ORDER — FLUOXETINE HYDROCHLORIDE 40 MG/1
CAPSULE ORAL
Qty: 90 CAPSULE | Refills: 1 | Status: SHIPPED | OUTPATIENT
Start: 2023-05-30

## 2023-05-30 NOTE — TELEPHONE ENCOUNTER
From: Ana Redding  To: Dr. Danae Cardona: 5/27/2023 1:19 PM EDT  Subject: Fluoexitine (Prozac)    Hi! I have received a several messages from [address] [place] [address] that they have tried to reach out to you regarding a prescription for my Prozac. I am down to 1 week of dosage. I sent a message to the office as well. Please contact [facility] and please let me know it has been approved. Thank you do much.

## 2023-06-09 DIAGNOSIS — E11.65 UNCONTROLLED TYPE 2 DIABETES MELLITUS WITH HYPERGLYCEMIA (HCC): ICD-10-CM

## 2023-06-10 LAB
EST. AVERAGE GLUCOSE BLD GHB EST-MCNC: 211.6 MG/DL
HBA1C MFR BLD: 9 %

## 2023-07-14 ENCOUNTER — TELEMEDICINE (OUTPATIENT)
Dept: ENDOCRINOLOGY | Age: 68
End: 2023-07-14
Payer: MEDICARE

## 2023-07-14 DIAGNOSIS — E11.65 UNCONTROLLED TYPE 2 DIABETES MELLITUS WITH HYPERGLYCEMIA (HCC): Primary | ICD-10-CM

## 2023-07-14 PROCEDURE — 3052F HG A1C>EQUAL 8.0%<EQUAL 9.0%: CPT | Performed by: INTERNAL MEDICINE

## 2023-07-14 PROCEDURE — 1123F ACP DISCUSS/DSCN MKR DOCD: CPT | Performed by: INTERNAL MEDICINE

## 2023-07-14 PROCEDURE — 99214 OFFICE O/P EST MOD 30 MIN: CPT | Performed by: INTERNAL MEDICINE

## 2023-07-14 NOTE — PROGRESS NOTES
CHO  Current exercise: walking   Current monitoring regimen: home blood tests   1-2 times per day  Has brought blood glucose log/meter:   Home blood sugar records:   Forgot reader    Average 217 per patient    Any episodes of hypoglycemia? 44    No Hx of CAD , PVD, CVA    Hyperlipidemia:controlled on statin LDL 51 on   Simvastatin 40mg  LDL 58 on 3/20  LDL 49 on   Last eye exam:   Last foot exam:   Last microalbumin to creatinine ratio:    HTN: On multiple medications, taking toprol 50mg?   Losartan 100mg verapamil 240mg Chlorthalidone 25mg clonidine 0.1mg TID  Uncontrolled    She has thyroid nodules, not on medication    Past Medical History:   Diagnosis Date    Anxiety and depression     Asthma     Edema     GERD (gastroesophageal reflux disease)     Hyperlipidemia     Hypertension     IBS (irritable bowel syndrome)     Irregular menstrual bleeding 2011    Dr. Elio Shore     Morbid obesity [facility])     Multinodular goiter 2010    Personal history of colonic polyps     Dr. Eden Vaughn- quinn for repeat colonoscopy     Positive PPD, treated     INH completed 2001    S/P cholecystectomy 2011    Type II or unspecified type diabetes mellitus without mention of complication, not stated as uncontrolled      Past Surgical History:   Procedure Laterality Date     SECTION N/A 30 plus years ago    x2    CHOLECYSTECTOMY      3/2009    COLONOSCOPY      2012 Dr. Candis Tamez 5 yrs    COLONOSCOPY      repeat x 3 yrs HAD 5 POLPYS    HYSTEROSCOPY  2015    HYSTEROSCOPY, DILATATION AND CURETTAGE WITH NOVASURE ABLATION AND ABLATION    MYOMECTOMY      2003    TIBIA FRACTURE SURGERY      2006 w/ revision     Current Outpatient Medications   Medication Sig Dispense Refill    cloNIDine (CATAPRES) 0.1 MG tablet Take 1 tablet by mouth every 8 hours 270 tablet 3    diclofenac sodium 1 % GEL Apply 4 g topically 4 times daily 3 Tube 5    buPROPion (WELLBUTRIN XL) 300 MG extended release

## 2023-07-31 ENCOUNTER — TELEMEDICINE (OUTPATIENT)
Dept: INTERNAL MEDICINE CLINIC | Age: 68
End: 2023-07-31

## 2023-07-31 DIAGNOSIS — G89.29 CHRONIC PAIN OF BOTH HIPS: Primary | ICD-10-CM

## 2023-07-31 DIAGNOSIS — M25.552 CHRONIC PAIN OF BOTH HIPS: Primary | ICD-10-CM

## 2023-07-31 DIAGNOSIS — R10.31 RIGHT GROIN PAIN: ICD-10-CM

## 2023-07-31 DIAGNOSIS — R29.6 FALLS FREQUENTLY: ICD-10-CM

## 2023-07-31 DIAGNOSIS — M25.551 CHRONIC PAIN OF BOTH HIPS: Primary | ICD-10-CM

## 2023-07-31 ASSESSMENT — ENCOUNTER SYMPTOMS
SHORTNESS OF BREATH: 0
DIARRHEA: 0
NAUSEA: 0
STRIDOR: 0
CHOKING: 0
TROUBLE SWALLOWING: 0
WHEEZING: 0
VOMITING: 0
APNEA: 0
CONSTIPATION: 0
ABDOMINAL DISTENTION: 0
PHOTOPHOBIA: 0
CHEST TIGHTNESS: 0
COUGH: 0
ABDOMINAL PAIN: 0
VOICE CHANGE: 0

## 2023-07-31 NOTE — ASSESSMENT & PLAN NOTE
Chronic, not improved with Physical therapy. Now has multiple falls. Not able to evaluate patient today due to virtual visit. - XR for further evaluation  - Will have patient see Ortho   - Is benefit with weight loss.  Counseled

## 2023-07-31 NOTE — ASSESSMENT & PLAN NOTE
Several falls over the past moonth   - getting in the car and fell onto the ground. Then another time, she fell after walking up and down the steps   - Can't walk more than 10 feet, trouble with sitting down for a long period of time. - Reduce mobility due to pain   - Need balance exercise with PT. Need weight loss.   -I advised patient to use an assisted device such as a cane or walker to help with her balance and prevent further falls. Patient declined.

## 2023-07-31 NOTE — PROGRESS NOTES
Luda Barfield (:  1955) is a Established patient, presenting virtually for evaluation of the following:  Chief Complaint   Patient presents with    Fall     Falling a lot (3 ) times 1 x mos. ((R)Hip strain) ,gait issues ,form to be filled out  for work restrictions          Assessment & Plan   Below is the assessment and plan developed based on review of pertinent history, physical exam, labs, studies, and medications. 1. Chronic pain of both hips  -     Trev Almanza MD, Orthopedic [facility] (Hip; Knee; Shoulder[facility]  2. Falls frequently  Assessment & Plan:   Several falls over the past moonth   - getting in the car and fell onto the ground. Then another time, she fell after walking up and down the steps   - Can't walk more than 10 feet, trouble with sitting down for a long period of time. - Reduce mobility due to pain   - Need balance exercise with PT. Need weight loss.   -I advised patient to use an assisted device such as a cane or walker to help with her balance and prevent further falls. Patient declined. Orders:  -     [facility] (Ortho & Sports performance)- [facility]  3. Right groin pain  Assessment & Plan:  Chronic, not improved with Physical therapy. Now has multiple falls. Not able to evaluate patient today due to virtual visit. - XR for further evaluation  - Will have patient see Ortho   - Is benefit with weight loss. Counseled   Orders:  -     Trev Almanza MD, Orthopedic Surgery (Hip; Knee; Shoulder), [facility]  -     XR HIP 2-3 VW W PELVIS RIGHT; Future    Return if symptoms worsen or fail to improve. Subjective   HPI  Erika Malik presented today virtually for frequent falls. She reports that over the past month, she had at least 3 falls. Most of the time she lost her balance and fell forward. Last episode was about 1 week ago, when she had to get in the car and trip over herself and fell forward.  Did not sustain any injury and did not

## 2023-08-01 ENCOUNTER — TELEPHONE (OUTPATIENT)
Dept: INTERNAL MEDICINE CLINIC | Age: 68
End: 2023-08-01

## 2023-08-01 NOTE — TELEPHONE ENCOUNTER
----- Message from JOBY sent at 8/1/2023 10:44 AM EDT -----  Subject: Message to Provider    QUESTIONS  Information for Provider? Message to Sade: \"On the form that you emailed   me, the section that says next visit with healthcare provider should say   10/20/2023 and form has todays date listed. \" Patient is requesting this be   corrected and emailed to her.  ---------------------------------------------------------------------------  --------------  Jordy Marine Zulema  [phone]; OK to leave message on voicemail  ---------------------------------------------------------------------------  --------------  SCRIPT ANSWERS  Relationship to Patient?  Self
Corrected the date to 10/20/23 Message Left on mychart (re-emailed)  letter today
no

## 2023-08-08 ENCOUNTER — PATIENT MESSAGE (OUTPATIENT)
Dept: ENDOCRINOLOGY | Age: 68
End: 2023-08-08

## 2023-08-08 NOTE — TELEPHONE ENCOUNTER
From: Nicholas Burroughs  To: Dr. Sinclair Servant: 8/8/2023 1:33 PM EDT  Subject: Insulin Dosage Instruction    Good Afternoon! I apologize for contacting you. I have lost my dosage instructions and I have checked my Visit Summary and not locate it. Please resubmit to me. Thank you!   Kera Eid

## 2023-08-12 ENCOUNTER — PATIENT MESSAGE (OUTPATIENT)
Dept: ENDOCRINOLOGY | Age: 68
End: 2023-08-12

## 2023-08-14 NOTE — TELEPHONE ENCOUNTER
From: Shana Mcmahon  To: Dr. Main Ip: 8/12/2023 10:10 PM EDT  Subject: Ardyth Eladioevelin Cárdenase 2    Hello! I was hoping there are some sample Freestyle [place] 2 sensors available. My sensors keep falling off and it is to early for me to order. Any assistance would be appreciated. Please contact me via my chart or I can be reached at [phone]. Thank you in advance.

## 2023-09-19 ENCOUNTER — TELEPHONE (OUTPATIENT)
Dept: PHARMACY | Facility: CLINIC | Age: 68
End: 2023-09-19

## 2023-09-19 NOTE — TELEPHONE ENCOUNTER
[facility] CLINICAL PHARMACY: ADHERENCE REVIEW  Identified care gap per [employer]: fills at [facility]: Statin adherence    Patient also appears to be prescribed: Diabetes    ASSESSMENT  Note losartan 100 mg on medication list. Per reconcile dispense history in Epic last filled 90 day supply 11/30/22. Appears likely no refills remaining   Dispensed Days Supply Quantity Provider Pharmacy   LOSARTAN POTASSIUM 100 MG TAB 11/30/2022 90 90 tablet Tara Lora MD [facility]   LOSARTAN POTASSIUM  100 MG TABS 04/25/2022 90 90 tablet Tara Lora MD [facility]   LOSARTAN POTASSIUM  100 MG TABS 01/27/2022 90 90 tablet Tara Lora MD [address] [place] [address] Records claims through 09/10/2023 (Prior Year [address] = not reported; YTD PDC = 91% - PASSED):   Jardiance 25 mg last filled on 5/16/23 for 90 day supply. Next refill due: 8/14/23. Note Jardiance increased 5/16/23 to 25 mg. Had gotten 90 10 mg tablets two weeks prior. Ozempic last filled on 8/16/23 for 84 day supply. Next refill due: 11/8/23    Per Reconcile Dispense History:   Dispensed Days Supply Quantity Provider Pharmacy    JARDIANCE  25 MG TABS 05/16/2023 90 90 tablet Luda Pump, MD [facility] [address] [place] [address]   JARDIANCE  10 MG TABS 05/02/2023 90 90 tablet Luda Pump, MD [id number]       Lab Results   Component Value Date    LABA1C 9.0 06/09/2023    LABA1C 11.0 01/16/2023    LABA1C 11.0 01/16/2023     NOTE: patient following with endocrinology- next appointment 10/31/23. Prescribed Humulin R U-500, Ozempic and Jardiance. [address] [facility] Records claims through 09/10/2023 (Prior Year [address] = not reported; YTD [address] = 75%; Potential Fail Date: 9/22/23): Atorvastatin 40 mg tablets last filled on 3/20/23 for 90 day supply. Next refill due: 7/15/23    Per [facility]:  1 refills remaining.     Lab Results   Component Value Date    CHOL 153 01/16/2023    TRIG 70 01/16/2023    HDL 65 (H)

## 2023-09-28 DIAGNOSIS — I10 PRIMARY HYPERTENSION: ICD-10-CM

## 2023-09-28 DIAGNOSIS — E11.65 UNCONTROLLED TYPE 2 DIABETES MELLITUS WITH HYPERGLYCEMIA (HCC): Primary | ICD-10-CM

## 2023-09-28 DIAGNOSIS — E78.00 PURE HYPERCHOLESTEROLEMIA: ICD-10-CM

## 2023-10-11 RX ORDER — BUSPIRONE HYDROCHLORIDE 15 MG/1
TABLET ORAL
Qty: 90 TABLET | Refills: 3 | Status: SHIPPED | OUTPATIENT
Start: 2023-10-11

## 2023-10-11 RX ORDER — LOSARTAN POTASSIUM 100 MG/1
100 TABLET ORAL DAILY
Qty: 90 TABLET | Refills: 3 | Status: SHIPPED | OUTPATIENT
Start: 2023-10-11

## 2023-10-16 ENCOUNTER — HOSPITAL ENCOUNTER (OUTPATIENT)
Dept: MAMMOGRAPHY | Age: 68
Discharge: HOME | End: 2023-10-16
Payer: MEDICARE

## 2023-10-16 VITALS — WEIGHT: 293 LBS | HEIGHT: 67 IN | BODY MASS INDEX: 45.99 KG/M2

## 2023-10-16 DIAGNOSIS — R92.8 ABNORMAL MAMMOGRAM: ICD-10-CM

## 2023-10-16 PROCEDURE — G0279 TOMOSYNTHESIS, MAMMO: HCPCS

## 2023-10-20 ENCOUNTER — OFFICE VISIT (OUTPATIENT)
Dept: INTERNAL MEDICINE CLINIC | Age: 68
End: 2023-10-20
Payer: MEDICARE

## 2023-10-20 VITALS
TEMPERATURE: 97.2 F | HEART RATE: 73 BPM | OXYGEN SATURATION: 99 % | BODY MASS INDEX: 45.99 KG/M2 | HEIGHT: 67 IN | DIASTOLIC BLOOD PRESSURE: 68 MMHG | SYSTOLIC BLOOD PRESSURE: 176 MMHG | WEIGHT: 293 LBS

## 2023-10-20 DIAGNOSIS — M25.551 RIGHT HIP PAIN: Primary | ICD-10-CM

## 2023-10-20 DIAGNOSIS — H61.23 BILATERAL IMPACTED CERUMEN: ICD-10-CM

## 2023-10-20 PROCEDURE — 1123F ACP DISCUSS/DSCN MKR DOCD: CPT | Performed by: STUDENT IN AN ORGANIZED HEALTH CARE EDUCATION/TRAINING PROGRAM

## 2023-10-20 PROCEDURE — 3078F DIAST BP <80 MM HG: CPT | Performed by: STUDENT IN AN ORGANIZED HEALTH CARE EDUCATION/TRAINING PROGRAM

## 2023-10-20 PROCEDURE — 99213 OFFICE O/P EST LOW 20 MIN: CPT | Performed by: STUDENT IN AN ORGANIZED HEALTH CARE EDUCATION/TRAINING PROGRAM

## 2023-10-20 PROCEDURE — 3077F SYST BP >= 140 MM HG: CPT | Performed by: STUDENT IN AN ORGANIZED HEALTH CARE EDUCATION/TRAINING PROGRAM

## 2023-10-20 PROCEDURE — 69209 REMOVE IMPACTED EAR WAX UNI: CPT | Performed by: STUDENT IN AN ORGANIZED HEALTH CARE EDUCATION/TRAINING PROGRAM

## 2023-10-20 RX ORDER — CYCLOBENZAPRINE HCL 10 MG
10 TABLET ORAL NIGHTLY PRN
Qty: 7 TABLET | Refills: 0 | Status: SHIPPED | OUTPATIENT
Start: 2023-10-20 | End: 2023-10-27

## 2023-10-20 NOTE — ASSESSMENT & PLAN NOTE
This is a chronic issue.  Has been seeing PT and pending appt with ortho  Flexeril prn for a few days until she is evaluated by Ortho  Continue PT

## 2023-10-26 SDOH — HEALTH STABILITY: PHYSICAL HEALTH: ON AVERAGE, HOW MANY MINUTES DO YOU ENGAGE IN EXERCISE AT THIS LEVEL?: 0 MIN

## 2023-10-26 SDOH — HEALTH STABILITY: PHYSICAL HEALTH: ON AVERAGE, HOW MANY DAYS PER WEEK DO YOU ENGAGE IN MODERATE TO STRENUOUS EXERCISE (LIKE A BRISK WALK)?: 0 DAYS

## 2023-10-26 ASSESSMENT — SOCIAL DETERMINANTS OF HEALTH (SDOH)
WITHIN THE LAST YEAR, HAVE YOU BEEN KICKED, HIT, SLAPPED, OR OTHERWISE PHYSICALLY HURT BY YOUR PARTNER OR EX-PARTNER?: NO
WITHIN THE LAST YEAR, HAVE YOU BEEN AFRAID OF YOUR PARTNER OR EX-PARTNER?: NO
WITHIN THE LAST YEAR, HAVE TO BEEN RAPED OR FORCED TO HAVE ANY KIND OF SEXUAL ACTIVITY BY YOUR PARTNER OR EX-PARTNER?: NO
WITHIN THE LAST YEAR, HAVE YOU BEEN HUMILIATED OR EMOTIONALLY ABUSED IN OTHER WAYS BY YOUR PARTNER OR EX-PARTNER?: NO

## 2023-10-27 ENCOUNTER — OFFICE VISIT (OUTPATIENT)
Dept: ORTHOPEDIC SURGERY | Age: 68
End: 2023-10-27

## 2023-10-27 VITALS — BODY MASS INDEX: 45.99 KG/M2 | HEIGHT: 67 IN | WEIGHT: 293 LBS

## 2023-10-27 DIAGNOSIS — M25.852 HIP IMPINGEMENT SYNDROME, LEFT: ICD-10-CM

## 2023-10-27 DIAGNOSIS — M25.551 HIP PAIN, BILATERAL: Primary | ICD-10-CM

## 2023-10-27 DIAGNOSIS — M25.851 HIP IMPINGEMENT SYNDROME, RIGHT: ICD-10-CM

## 2023-10-27 DIAGNOSIS — M25.552 HIP PAIN, BILATERAL: Primary | ICD-10-CM

## 2023-10-27 DIAGNOSIS — M16.0 PRIMARY OSTEOARTHRITIS OF BOTH HIPS: ICD-10-CM

## 2023-10-27 RX ORDER — MELOXICAM 15 MG/1
15 TABLET ORAL DAILY
Qty: 30 TABLET | Refills: 3 | Status: SHIPPED | OUTPATIENT
Start: 2023-10-27 | End: 2023-10-27 | Stop reason: CLARIF

## 2023-10-27 RX ORDER — MELOXICAM 15 MG/1
15 TABLET ORAL DAILY
Qty: 90 TABLET | Refills: 0 | Status: SHIPPED | OUTPATIENT
Start: 2023-10-27

## 2023-10-27 NOTE — PROGRESS NOTES
Dr Mily Allen      Date /Time 10/27/2023       9:20 AM EDT  Name Lady Fofana             1955   Location  [facility]  MRN [id number]                Chief Complaint   Patient presents with    Hip Pain     Bilateral hip pain    Falling a lot (3 ) times 1 x mos. ((R)Hip strain) ,gait issues ,form to be filled out  for work restrictions             History of Present Illness    Lady Fofana is a 80 y.o. female who presents with  bilateral hip pain. Sent in consultation by Eder Fields MD, . Injury Mechanism:  none. Worker's Comp. & legal issues:   none. Previous Treatments: Ice, Heat, and NSAIDs    Patient presents to the office today for a new problem. Patient here with a chief complaint of bilateral hip pain. Patient's pain is mostly concentrated in her groin. She has increased pain with activities and improvement with rest.  She has had no injury or trauma. She has tried ibuprofen based on the recommendations of her primary care provider with only mild relief. She has also been in physical therapy.     Past History  Past Medical History:   Diagnosis Date    Anxiety and depression     Asthma     Edema     GERD (gastroesophageal reflux disease)     Hyperlipidemia     Hypertension     IBS (irritable bowel syndrome)     Irregular menstrual bleeding 2011    Dr. Raymundo Cruz     Morbid obesity [facility])     Multinodular goiter 2010    Personal history of colonic polyps     Dr. Irlanda Ronquillo- due for repeat colonoscopy     Positive PPD, treated     INH completed 2001    S/P cholecystectomy 2011    Type II or unspecified type diabetes mellitus without mention of complication, not stated as uncontrolled      Past Surgical History:   Procedure Laterality Date     SECTION N/A 30 plus years ago    x2    CHOLECYSTECTOMY      3/2009    COLONOSCOPY      2012 Dr. Zack Harris 5 yrs    COLONOSCOPY      repeat x 3 yrs HAD 5 POLPYS    COLONOSCOPY N/A 2023    COLONOSCOPY

## 2023-10-31 ENCOUNTER — OFFICE VISIT (OUTPATIENT)
Dept: ENDOCRINOLOGY | Age: 68
End: 2023-10-31
Payer: MEDICARE

## 2023-10-31 VITALS
OXYGEN SATURATION: 97 % | HEART RATE: 68 BPM | SYSTOLIC BLOOD PRESSURE: 134 MMHG | DIASTOLIC BLOOD PRESSURE: 66 MMHG | WEIGHT: 293 LBS | HEIGHT: 67 IN | TEMPERATURE: 98 F | BODY MASS INDEX: 45.99 KG/M2 | RESPIRATION RATE: 15 BRPM

## 2023-10-31 DIAGNOSIS — E78.00 PURE HYPERCHOLESTEROLEMIA: ICD-10-CM

## 2023-10-31 DIAGNOSIS — I10 PRIMARY HYPERTENSION: ICD-10-CM

## 2023-10-31 DIAGNOSIS — E11.65 UNCONTROLLED TYPE 2 DIABETES MELLITUS WITH HYPERGLYCEMIA (HCC): Primary | ICD-10-CM

## 2023-10-31 DIAGNOSIS — E11.65 UNCONTROLLED TYPE 2 DIABETES MELLITUS WITH HYPERGLYCEMIA (HCC): ICD-10-CM

## 2023-10-31 PROCEDURE — 3075F SYST BP GE 130 - 139MM HG: CPT | Performed by: INTERNAL MEDICINE

## 2023-10-31 PROCEDURE — 3052F HG A1C>EQUAL 8.0%<EQUAL 9.0%: CPT | Performed by: INTERNAL MEDICINE

## 2023-10-31 PROCEDURE — 99214 OFFICE O/P EST MOD 30 MIN: CPT | Performed by: INTERNAL MEDICINE

## 2023-10-31 PROCEDURE — 95251 CONT GLUC MNTR ANALYSIS I&R: CPT | Performed by: INTERNAL MEDICINE

## 2023-10-31 PROCEDURE — 3078F DIAST BP <80 MM HG: CPT | Performed by: INTERNAL MEDICINE

## 2023-10-31 PROCEDURE — 1123F ACP DISCUSS/DSCN MKR DOCD: CPT | Performed by: INTERNAL MEDICINE

## 2023-10-31 NOTE — PROGRESS NOTES
Seen as f/u patient for diabetes      Interm:    Reports lows  Doing 70 units before meals  No diet changes    May take insulin after meals  Hold insulin if glucose in 80s    Not using CGM for a month as had fall off    Diagnosed with Type 2 diabetes mellitus 8 years ago  Known diabetic complications: Retinopathy NPDR    Current diabetic medications   U-500  230 TID  SSI 5 for 50 > 150  Ozempic 2mg  Jardiance 25mg    Previous:    U-500 70/80/70  But taking only with lunch n dinner   SSI 5 for 61>194  Trulicity    Moderate, uncontrolled    Previous:    Lantus 70/70    But missing second shot 3 days a week   But doing 66/66  Humalog 38 units AC TID, taking it 2/week    But doing 34   SSI 2 for 50>150  She has relative hypoglycemia in the 70s    H/o use of lantus, novolog, victoza, januvia    Metformin and ER caused GI symptoms    Last A1c  9% <-----11%<-----12.3%<---- 9.5%<----- 9.2%<----10.5%<-----9.3%<------9.9%<-----9.2%<------10.2 on 5/17<--- 8.4 on 12/16<-----7.8 on 6/16<------ 9.6 on 2/16<---- 10.5 on 10/15<-----11.8 on 8/15<---14.2 <--- 10.6 <--- 9.4    Prior visit with dietician: Yes   Current diet: on average, 3 meals per day does not follow low CHO  Current exercise: walking   Current monitoring regimen: home blood tests   1-2 times per day  Has brought blood glucose log/meter:   Home blood sugar records:     CGM  Last 2 weeks  Average 258  Target 16 %  84% high  0 % low  111-303    Any episodes of hypoglycemia? 44    No Hx of CAD , PVD, CVA    Hyperlipidemia:controlled on statin LDL 51 on 9/17  Simvastatin 40mg  LDL 58 on 3/20  LDL 49 on 11/21  Last eye exam: 2/20  Last foot exam: 12/21  Last microalbumin to creatinine ratio:1/23    HTN: On multiple medications, taking toprol 50mg?   Losartan 100mg verapamil 240mg Chlorthalidone 25mg clonidine 0.1mg TID  Uncontrolled    She has thyroid nodules, not on medication    Past Medical History:   Diagnosis Date    Anxiety and depression     Asthma     Edema     GERD

## 2023-11-01 DIAGNOSIS — R74.8 ELEVATED LIVER ENZYMES: Primary | ICD-10-CM

## 2023-11-01 LAB
ALBUMIN SERPL-MCNC: 3.9 G/DL (ref 3.4–5)
ALBUMIN/GLOB SERPL: 1.4 {RATIO} (ref 1.1–2.2)
ALP SERPL-CCNC: 148 U/L (ref 40–129)
ALT SERPL-CCNC: 12 U/L (ref 10–40)
ANION GAP SERPL CALCULATED.3IONS-SCNC: 17 MMOL/L (ref 3–16)
AST SERPL-CCNC: 10 U/L (ref 15–37)
BASOPHILS # BLD: 0 K/UL (ref 0–0.2)
BASOPHILS NFR BLD: 0.4 %
BILIRUB SERPL-MCNC: <0.2 MG/DL (ref 0–1)
BUN SERPL-MCNC: 11 MG/DL (ref 7–20)
CALCIUM SERPL-MCNC: 9.6 MG/DL (ref 8.3–10.6)
CHLORIDE SERPL-SCNC: 95 MMOL/L (ref 99–110)
CHOLEST SERPL-MCNC: 152 MG/DL (ref 0–199)
CO2 SERPL-SCNC: 25 MMOL/L (ref 21–32)
CREAT SERPL-MCNC: 0.8 MG/DL (ref 0.6–1.2)
DEPRECATED RDW RBC AUTO: 13.1 % (ref 12.4–15.4)
EOSINOPHIL # BLD: 0.1 K/UL (ref 0–0.6)
EOSINOPHIL NFR BLD: 1.7 %
EST. AVERAGE GLUCOSE BLD GHB EST-MCNC: 234.6 MG/DL
GFR SERPLBLD CREATININE-BSD FMLA CKD-EPI: >60 ML/MIN/{1.73_M2}
GLUCOSE SERPL-MCNC: 127 MG/DL (ref 70–99)
HBA1C MFR BLD: 9.8 %
HCT VFR BLD AUTO: 41.1 % (ref 36–48)
HDLC SERPL-MCNC: 59 MG/DL (ref 40–60)
HGB BLD-MCNC: 14 G/DL (ref 12–16)
LDL CHOLESTEROL CALCULATED: 80 MG/DL
LYMPHOCYTES # BLD: 3 K/UL (ref 1–5.1)
LYMPHOCYTES NFR BLD: 35.3 %
MCH RBC QN AUTO: 31.1 PG (ref 26–34)
MCHC RBC AUTO-ENTMCNC: 34.1 G/DL (ref 31–36)
MCV RBC AUTO: 91.3 FL (ref 80–100)
MONOCYTES # BLD: 0.5 K/UL (ref 0–1.3)
MONOCYTES NFR BLD: 5.7 %
NEUTROPHILS # BLD: 4.8 K/UL (ref 1.7–7.7)
NEUTROPHILS NFR BLD: 56.9 %
PLATELET # BLD AUTO: 223 K/UL (ref 135–450)
PMV BLD AUTO: 11.8 FL (ref 5–10.5)
POTASSIUM SERPL-SCNC: 4.4 MMOL/L (ref 3.5–5.1)
PROT SERPL-MCNC: 6.6 G/DL (ref 6.4–8.2)
RBC # BLD AUTO: 4.51 M/UL (ref 4–5.2)
SODIUM SERPL-SCNC: 137 MMOL/L (ref 136–145)
TRIGL SERPL-MCNC: 67 MG/DL (ref 0–150)
TSH SERPL DL<=0.005 MIU/L-ACNC: 1.75 UIU/ML (ref 0.27–4.2)
VLDLC SERPL CALC-MCNC: 13 MG/DL
WBC # BLD AUTO: 8.5 K/UL (ref 4–11)

## 2023-11-02 ENCOUNTER — APPOINTMENT (OUTPATIENT)
Dept: GENERAL RADIOLOGY | Age: 68
End: 2023-11-02
Payer: MEDICARE

## 2023-11-02 ENCOUNTER — HOSPITAL ENCOUNTER (EMERGENCY)
Age: 68
Discharge: HOME | End: 2023-11-02
Attending: EMERGENCY MEDICINE
Payer: MEDICARE

## 2023-11-02 ENCOUNTER — TELEPHONE (OUTPATIENT)
Dept: INTERNAL MEDICINE CLINIC | Age: 68
End: 2023-11-02

## 2023-11-02 VITALS
DIASTOLIC BLOOD PRESSURE: 88 MMHG | HEART RATE: 81 BPM | OXYGEN SATURATION: 99 % | RESPIRATION RATE: 18 BRPM | SYSTOLIC BLOOD PRESSURE: 193 MMHG | TEMPERATURE: 97.8 F | BODY MASS INDEX: 50.12 KG/M2 | HEIGHT: 67 IN

## 2023-11-02 DIAGNOSIS — S93.602A SPRAIN OF LEFT FOOT, INITIAL ENCOUNTER: Primary | ICD-10-CM

## 2023-11-02 PROCEDURE — 73560 X-RAY EXAM OF KNEE 1 OR 2: CPT

## 2023-11-02 PROCEDURE — 6370000000 HC RX 637 (ALT 250 FOR IP): Performed by: EMERGENCY MEDICINE

## 2023-11-02 PROCEDURE — 73630 X-RAY EXAM OF FOOT: CPT

## 2023-11-02 PROCEDURE — 99283 EMERGENCY DEPT VISIT LOW MDM: CPT

## 2023-11-02 PROCEDURE — 73610 X-RAY EXAM OF ANKLE: CPT

## 2023-11-02 RX ORDER — HYDROCODONE BITARTRATE AND ACETAMINOPHEN 5; 325 MG/1; MG/1
1 TABLET ORAL ONCE
Status: COMPLETED | OUTPATIENT
Start: 2023-11-02 | End: 2023-11-02

## 2023-11-02 RX ORDER — NAPROXEN 500 MG/1
500 TABLET ORAL 2 TIMES DAILY PRN
Qty: 20 TABLET | Refills: 0 | Status: SHIPPED | OUTPATIENT
Start: 2023-11-02 | End: 2023-11-02

## 2023-11-02 RX ORDER — ETODOLAC 400 MG/1
400 TABLET, FILM COATED ORAL 2 TIMES DAILY
Qty: 60 TABLET | Refills: 0 | Status: SHIPPED | OUTPATIENT
Start: 2023-11-02

## 2023-11-02 RX ADMIN — HYDROCODONE BITARTRATE AND ACETAMINOPHEN 1 TABLET: 5; 325 TABLET ORAL at 03:12

## 2023-11-02 ASSESSMENT — PAIN DESCRIPTION - ONSET: ONSET: ON-GOING

## 2023-11-02 ASSESSMENT — PAIN - FUNCTIONAL ASSESSMENT
PAIN_FUNCTIONAL_ASSESSMENT: 0-10
PAIN_FUNCTIONAL_ASSESSMENT: PREVENTS OR INTERFERES SOME ACTIVE ACTIVITIES AND ADLS

## 2023-11-02 ASSESSMENT — PAIN DESCRIPTION - ORIENTATION: ORIENTATION: LEFT

## 2023-11-02 ASSESSMENT — PAIN DESCRIPTION - LOCATION: LOCATION: ANKLE

## 2023-11-02 ASSESSMENT — PAIN DESCRIPTION - DESCRIPTORS: DESCRIPTORS: DISCOMFORT

## 2023-11-02 ASSESSMENT — ENCOUNTER SYMPTOMS
GASTROINTESTINAL NEGATIVE: 1
EYES NEGATIVE: 1
RESPIRATORY NEGATIVE: 1

## 2023-11-02 ASSESSMENT — LIFESTYLE VARIABLES
HOW OFTEN DO YOU HAVE A DRINK CONTAINING ALCOHOL: MONTHLY OR LESS
HOW MANY STANDARD DRINKS CONTAINING ALCOHOL DO YOU HAVE ON A TYPICAL DAY: 1 OR 2

## 2023-11-02 ASSESSMENT — PAIN SCALES - GENERAL: PAINLEVEL_OUTOF10: 8

## 2023-11-02 ASSESSMENT — PAIN DESCRIPTION - PAIN TYPE: TYPE: ACUTE PAIN

## 2023-11-02 ASSESSMENT — PAIN DESCRIPTION - FREQUENCY: FREQUENCY: CONTINUOUS

## 2023-11-02 NOTE — DISCHARGE INSTRUCTIONS
Your x-rays show no evidence of broken bones, so this is likely due to a sprain of the ligaments of your foot. Use Naprosyn as needed for pain. Use walking boot to help with ambulation. Follow-up with your orthopedic surgeon for repeat evaluation and further testing if symptoms continue.

## 2023-11-02 NOTE — TELEPHONE ENCOUNTER
Patient called in wanting to know the status of form that was sent over. It is work restriction accomodation form. [phone] is fax number for paper to be sent back once filled out    Pls call and advise.

## 2023-11-02 NOTE — ED NOTES
Patient prepared for and ready to be discharged. Dressed in clothes and given belongings. Patient discharged at this time in no acute distress after pt verbalized understanding of discharge instructions. Reviewed medications, and when to return to the ED with patient. Encouraged follow up with Ortho  Patient walked to lobby, Family to take patient home.       Patrick Chandler RN  11/02/23 [id number]

## 2023-11-05 ENCOUNTER — PATIENT MESSAGE (OUTPATIENT)
Dept: ENDOCRINOLOGY | Age: 68
End: 2023-11-05

## 2023-11-06 NOTE — TELEPHONE ENCOUNTER
Form faxed back today    to  [phone]  Per patient/also FAXED  [phone]  TODAY (been out of office)

## 2023-11-06 NOTE — TELEPHONE ENCOUNTER
From: Juancarlos Srinivasan  To: Dr. Kendra Zapien: 11/5/2023 6:47 PM EST  Subject: Lost CGM [place]    Hi! I can not locate my CGM reader and was wondering if I left it at your office at my last visit on 10/31/23? Send a message to [employer]. Thank you!   Have a wonderful week

## 2023-12-05 RX ORDER — FLUOXETINE HYDROCHLORIDE 40 MG/1
CAPSULE ORAL
Qty: 90 CAPSULE | Refills: 3 | Status: SHIPPED | OUTPATIENT
Start: 2023-12-05

## 2023-12-05 RX ORDER — ATORVASTATIN CALCIUM 40 MG/1
TABLET, FILM COATED ORAL
Qty: 90 TABLET | Refills: 3 | Status: SHIPPED | OUTPATIENT
Start: 2023-12-05

## 2023-12-12 ENCOUNTER — HOSPITAL ENCOUNTER (OUTPATIENT)
Dept: ULTRASOUND IMAGING | Age: 68
Discharge: HOME | End: 2023-12-12
Payer: MEDICARE

## 2023-12-12 DIAGNOSIS — R74.8 ELEVATED LIVER ENZYMES: ICD-10-CM

## 2023-12-12 PROCEDURE — 76705 ECHO EXAM OF ABDOMEN: CPT

## 2024-01-02 DIAGNOSIS — M25.552 HIP PAIN, BILATERAL: ICD-10-CM

## 2024-01-02 DIAGNOSIS — M25.551 HIP PAIN, BILATERAL: ICD-10-CM

## 2024-01-02 DIAGNOSIS — M25.851 HIP IMPINGEMENT SYNDROME, RIGHT: ICD-10-CM

## 2024-01-02 DIAGNOSIS — M25.852 HIP IMPINGEMENT SYNDROME, LEFT: ICD-10-CM

## 2024-01-02 DIAGNOSIS — M16.0 PRIMARY OSTEOARTHRITIS OF BOTH HIPS: ICD-10-CM

## 2024-01-04 RX ORDER — VERAPAMIL HYDROCHLORIDE 240 MG/1
240 TABLET, FILM COATED, EXTENDED RELEASE ORAL NIGHTLY
Qty: 90 TABLET | Refills: 3 | Status: SHIPPED | OUTPATIENT
Start: 2024-01-04

## 2024-01-08 RX ORDER — MELOXICAM 15 MG/1
15 TABLET ORAL DAILY
Qty: 90 TABLET | Refills: 3 | Status: SHIPPED | OUTPATIENT
Start: 2024-01-08

## 2024-01-22 ENCOUNTER — OFFICE VISIT (OUTPATIENT)
Dept: INTERNAL MEDICINE CLINIC | Age: 69
End: 2024-01-22
Payer: MEDICARE

## 2024-01-22 VITALS
HEART RATE: 78 BPM | DIASTOLIC BLOOD PRESSURE: 80 MMHG | BODY MASS INDEX: 45.99 KG/M2 | HEIGHT: 67 IN | SYSTOLIC BLOOD PRESSURE: 158 MMHG | WEIGHT: 293 LBS | OXYGEN SATURATION: 99 % | TEMPERATURE: 97 F

## 2024-01-22 DIAGNOSIS — J34.89 NASAL DRAINAGE: Primary | ICD-10-CM

## 2024-01-22 DIAGNOSIS — E66.01 MORBID OBESITY WITH BMI OF 50.0-59.9, ADULT (HCC): ICD-10-CM

## 2024-01-22 DIAGNOSIS — E11.65 UNCONTROLLED TYPE 2 DIABETES MELLITUS WITH HYPERGLYCEMIA (HCC): ICD-10-CM

## 2024-01-22 DIAGNOSIS — R93.2 ABNORMAL FINDING ON IMAGING OF LIVER: ICD-10-CM

## 2024-01-22 PROCEDURE — 1123F ACP DISCUSS/DSCN MKR DOCD: CPT | Performed by: STUDENT IN AN ORGANIZED HEALTH CARE EDUCATION/TRAINING PROGRAM

## 2024-01-22 PROCEDURE — 3079F DIAST BP 80-89 MM HG: CPT | Performed by: STUDENT IN AN ORGANIZED HEALTH CARE EDUCATION/TRAINING PROGRAM

## 2024-01-22 PROCEDURE — 99213 OFFICE O/P EST LOW 20 MIN: CPT | Performed by: STUDENT IN AN ORGANIZED HEALTH CARE EDUCATION/TRAINING PROGRAM

## 2024-01-22 PROCEDURE — 3077F SYST BP >= 140 MM HG: CPT | Performed by: STUDENT IN AN ORGANIZED HEALTH CARE EDUCATION/TRAINING PROGRAM

## 2024-01-22 ASSESSMENT — PATIENT HEALTH QUESTIONNAIRE - PHQ9
SUM OF ALL RESPONSES TO PHQ9 QUESTIONS 1 & 2: 0
4. FEELING TIRED OR HAVING LITTLE ENERGY: 0
9. THOUGHTS THAT YOU WOULD BE BETTER OFF DEAD, OR OF HURTING YOURSELF: 0
3. TROUBLE FALLING OR STAYING ASLEEP: 0
1. LITTLE INTEREST OR PLEASURE IN DOING THINGS: 0
7. TROUBLE CONCENTRATING ON THINGS, SUCH AS READING THE NEWSPAPER OR WATCHING TELEVISION: 0
8. MOVING OR SPEAKING SO SLOWLY THAT OTHER PEOPLE COULD HAVE NOTICED. OR THE OPPOSITE, BEING SO FIGETY OR RESTLESS THAT YOU HAVE BEEN MOVING AROUND A LOT MORE THAN USUAL: 0
SUM OF ALL RESPONSES TO PHQ QUESTIONS 1-9: 0
5. POOR APPETITE OR OVEREATING: 0
2. FEELING DOWN, DEPRESSED OR HOPELESS: 0
SUM OF ALL RESPONSES TO PHQ QUESTIONS 1-9: 0
6. FEELING BAD ABOUT YOURSELF - OR THAT YOU ARE A FAILURE OR HAVE LET YOURSELF OR YOUR FAMILY DOWN: 0
SUM OF ALL RESPONSES TO PHQ QUESTIONS 1-9: 0
10. IF YOU CHECKED OFF ANY PROBLEMS, HOW DIFFICULT HAVE THESE PROBLEMS MADE IT FOR YOU TO DO YOUR WORK, TAKE CARE OF THINGS AT HOME, OR GET ALONG WITH OTHER PEOPLE: 0
SUM OF ALL RESPONSES TO PHQ QUESTIONS 1-9: 0

## 2024-01-22 NOTE — ASSESSMENT & PLAN NOTE
Discussed at length with patient  Recent Abd US shows possible coarse liver changes, will need to work on weight loss.

## 2024-01-22 NOTE — PROGRESS NOTES
Pia El (:  1955) is a 68 y.o. female here for evaluation of the following chief complaint(s):  Diabetes (3 mo. Follow  DM CHECK,feeling fatigue ,runny nose ,some headaches)         ASSESSMENT/PLAN:  1. Nasal drainage  Assessment & Plan:   Flonase, antihistamine daily   2. Uncontrolled type 2 diabetes mellitus with hyperglycemia (HCC)  Assessment & Plan:   Sees Dr. Munguia  On long term insulin therapy  Continue with same meds  3. Morbid obesity with BMI of 50.0-59.9, adult (HCC)  Assessment & Plan:  Discussed at length with patient  Recent Abd US shows possible coarse liver changes, will need to work on weight loss.   4. Abnormal finding on imaging of liver  Assessment & Plan:   Coarsened hepatic echotexture on abd US with underlying metabolic syndrome. Will need to work on weight, better BS control.  Repeat US in 6 months. Most likely will also need labs to rule out other etiology (hepatitis, etc)      Return in about 3 months (around 2024) for Annual Physical Exam.         Subjective   SUBJECTIVE/OBJECTIVE:  HPI  Presented today for 3 month follow up visit.   She complains of runny nose, some throat irritation, and intermittent HA for the past 1 month. Some cough. More tired than normal.   No fever, chills. People have been sick at work.       Review of Systems:   Constitutional:  Denies fever or chills   Eyes:  Denies change in visual acuity   HENT:  Denies nasal congestion or sore throat   Respiratory:  Denies cough or shortness of breath   Cardiovascular:  Denies chest pain or edema   GI:  Denies abdominal pain, nausea, vomiting, bloody stools or diarrhea   :  Denies dysuria   Musculoskeletal:  Denies back pain or joint pain   Integument:  Denies rash   Neurologic:  Denies headache, focal weakness or sensory changes   Endocrine:  Denies polyuria or polydipsia   Lymphatic:  Denies swollen glands   Psychiatric:  Denies depression or anxiety        Current Outpatient Medications   Medication

## 2024-01-22 NOTE — ASSESSMENT & PLAN NOTE
Coarsened hepatic echotexture on abd US with underlying metabolic syndrome. Will need to work on weight, better BS control.  Repeat US in 6 months. Most likely will also need labs to rule out other etiology (hepatitis, etc)

## 2024-02-26 ENCOUNTER — OFFICE VISIT (OUTPATIENT)
Dept: ENDOCRINOLOGY | Age: 69
End: 2024-02-26
Payer: MEDICARE

## 2024-02-26 VITALS
WEIGHT: 293 LBS | HEIGHT: 67 IN | TEMPERATURE: 98 F | DIASTOLIC BLOOD PRESSURE: 85 MMHG | BODY MASS INDEX: 45.99 KG/M2 | SYSTOLIC BLOOD PRESSURE: 128 MMHG | HEART RATE: 82 BPM | OXYGEN SATURATION: 97 % | RESPIRATION RATE: 14 BRPM

## 2024-02-26 DIAGNOSIS — E11.65 UNCONTROLLED TYPE 2 DIABETES MELLITUS WITH HYPERGLYCEMIA (HCC): Primary | ICD-10-CM

## 2024-02-26 LAB — HBA1C MFR BLD: 8.8 %

## 2024-02-26 PROCEDURE — 3079F DIAST BP 80-89 MM HG: CPT | Performed by: INTERNAL MEDICINE

## 2024-02-26 PROCEDURE — 3074F SYST BP LT 130 MM HG: CPT | Performed by: INTERNAL MEDICINE

## 2024-02-26 PROCEDURE — 1123F ACP DISCUSS/DSCN MKR DOCD: CPT | Performed by: INTERNAL MEDICINE

## 2024-02-26 PROCEDURE — 99214 OFFICE O/P EST MOD 30 MIN: CPT | Performed by: INTERNAL MEDICINE

## 2024-02-26 PROCEDURE — 95251 CONT GLUC MNTR ANALYSIS I&R: CPT | Performed by: INTERNAL MEDICINE

## 2024-02-26 PROCEDURE — 83036 HEMOGLOBIN GLYCOSYLATED A1C: CPT | Performed by: INTERNAL MEDICINE

## 2024-03-07 ENCOUNTER — PATIENT MESSAGE (OUTPATIENT)
Dept: INTERNAL MEDICINE CLINIC | Age: 69
End: 2024-03-07

## 2024-03-07 RX ORDER — CYCLOBENZAPRINE HCL 10 MG
10 TABLET ORAL 2 TIMES DAILY PRN
Qty: 28 TABLET | Refills: 0 | Status: SHIPPED | OUTPATIENT
Start: 2024-03-07 | End: 2024-03-21

## 2024-03-07 NOTE — TELEPHONE ENCOUNTER
From: Pia El  To: Dr. Meena Castelan  Sent: 3/7/2024 9:27 AM EST  Subject: Cyclobenzaprine 10mg tablets    Good morning!  I have a request for medication for muscle spasm in my lower back.  I have had this medication in the past and taken as needed.  Please message me back at your convenience.    Thank you!

## 2024-03-11 ENCOUNTER — OFFICE VISIT (OUTPATIENT)
Dept: INTERNAL MEDICINE CLINIC | Age: 69
End: 2024-03-11
Payer: MEDICARE

## 2024-03-11 VITALS
DIASTOLIC BLOOD PRESSURE: 80 MMHG | OXYGEN SATURATION: 98 % | SYSTOLIC BLOOD PRESSURE: 150 MMHG | HEIGHT: 67 IN | BODY MASS INDEX: 45.99 KG/M2 | HEART RATE: 89 BPM | WEIGHT: 293 LBS

## 2024-03-11 DIAGNOSIS — R10.84 GENERALIZED ABDOMINAL PAIN: Primary | ICD-10-CM

## 2024-03-11 PROCEDURE — 1123F ACP DISCUSS/DSCN MKR DOCD: CPT | Performed by: STUDENT IN AN ORGANIZED HEALTH CARE EDUCATION/TRAINING PROGRAM

## 2024-03-11 PROCEDURE — 99213 OFFICE O/P EST LOW 20 MIN: CPT | Performed by: STUDENT IN AN ORGANIZED HEALTH CARE EDUCATION/TRAINING PROGRAM

## 2024-03-11 PROCEDURE — 3079F DIAST BP 80-89 MM HG: CPT | Performed by: STUDENT IN AN ORGANIZED HEALTH CARE EDUCATION/TRAINING PROGRAM

## 2024-03-11 PROCEDURE — 3077F SYST BP >= 140 MM HG: CPT | Performed by: STUDENT IN AN ORGANIZED HEALTH CARE EDUCATION/TRAINING PROGRAM

## 2024-03-11 RX ORDER — OMEPRAZOLE 40 MG/1
40 CAPSULE, DELAYED RELEASE ORAL
Qty: 90 CAPSULE | Refills: 1 | Status: SHIPPED | OUTPATIENT
Start: 2024-03-11

## 2024-03-11 NOTE — ASSESSMENT & PLAN NOTE
Right lower quadrant abdominal pain, some nausea feeling gassy etiology.  Will start omeprazole.   Can increase fiber intake with fiber supplement.  Monitor symptoms at this time.  Recent ultrasound reviewed, last colonoscopy was up-to-date.

## 2024-03-11 NOTE — PROGRESS NOTES
Pia El (:  1955) is a 68 y.o. female here for evaluation of the following chief complaint(s):  Back Pain (Right side shrap pain/ lower back nausea,no appetite x 1wk )         ASSESSMENT/PLAN:  1. Generalized abdominal pain  Assessment & Plan:  Right lower quadrant abdominal pain, some nausea feeling gassy etiology.  Will start omeprazole.   Can increase fiber intake with fiber supplement.  Monitor symptoms at this time.  Recent ultrasound reviewed, last colonoscopy was up-to-date.        Return if symptoms worsen or fail to improve.         Subjective   SUBJECTIVE/OBJECTIVE:  HPI  Presented today with lower back pain, right sided.  However upon further questioning, she does have more like a lower abdominal pain on the right side, also endorses loose bowel movement making poor appetite some nausea over the past week.  Denies any vomiting pain is not comes and go but constant.  No urinary symptoms.  Also feels very belching and gassy lately.  She has a history of cholecystectomy in .    Review of Systems:   Constitutional:  Denies fever or chills   Eyes:  Denies change in visual acuity   HENT:  Denies nasal congestion or sore throat   Respiratory:  Denies cough or shortness of breath   Cardiovascular:  Denies chest pain or edema   GI:  Denies abdominal pain, nausea, vomiting, bloody stools or diarrhea   :  Denies dysuria   Musculoskeletal:  Denies back pain or joint pain   Integument:  Denies rash   Neurologic:  Denies headache, focal weakness or sensory changes   Endocrine:  Denies polyuria or polydipsia   Lymphatic:  Denies swollen glands   Psychiatric:  Denies depression or anxiety        Current Outpatient Medications   Medication Sig Dispense Refill    omeprazole (PRILOSEC) 40 MG delayed release capsule Take 1 capsule by mouth every morning (before breakfast) 90 capsule 1    cyclobenzaprine (FLEXERIL) 10 MG tablet Take 1 tablet by mouth 2 times daily as needed for Muscle spasms 28 tablet 0

## 2024-03-11 NOTE — PATIENT INSTRUCTIONS
Check you blood pressure for the next 4 weeks, twice a daily. Please call the office with your reading   Take fiber supplement daily   Start Omeprazole, every morning on empty stomach.

## 2024-03-13 RX ORDER — TIZANIDINE 2 MG/1
2 TABLET ORAL NIGHTLY PRN
Qty: 10 TABLET | Refills: 0 | Status: SHIPPED | OUTPATIENT
Start: 2024-03-13

## 2024-03-29 DIAGNOSIS — R92.1 BREAST CALCIFICATIONS: Primary | ICD-10-CM

## 2024-04-01 RX ORDER — CHLORTHALIDONE 50 MG/1
50 TABLET ORAL DAILY
Qty: 90 TABLET | Refills: 1 | Status: SHIPPED | OUTPATIENT
Start: 2024-04-01

## 2024-04-12 RX ORDER — MELOXICAM 15 MG/1
15 TABLET ORAL DAILY PRN
Qty: 30 TABLET | Refills: 0 | Status: SHIPPED | OUTPATIENT
Start: 2024-04-12

## 2024-04-15 ENCOUNTER — TELEPHONE (OUTPATIENT)
Dept: ORTHOPEDIC SURGERY | Age: 69
End: 2024-04-15

## 2024-04-15 NOTE — TELEPHONE ENCOUNTER
PATIENT CALLED NEEDING A REFERRAL FOR THERAPY AT THE [facility] LOC[facility].  PLEASE CALL PATIENT WHEN READY TO SCHEDULE.

## 2024-04-22 ENCOUNTER — OFFICE VISIT (OUTPATIENT)
Dept: INTERNAL MEDICINE CLINIC | Age: 69
End: 2024-04-22
Payer: MEDICARE

## 2024-04-22 VITALS
HEART RATE: 62 BPM | SYSTOLIC BLOOD PRESSURE: 158 MMHG | BODY MASS INDEX: 45.99 KG/M2 | TEMPERATURE: 97.4 F | DIASTOLIC BLOOD PRESSURE: 82 MMHG | OXYGEN SATURATION: 98 % | WEIGHT: 293 LBS | HEIGHT: 67 IN

## 2024-04-22 DIAGNOSIS — I10 PRIMARY HYPERTENSION: ICD-10-CM

## 2024-04-22 DIAGNOSIS — E66.01 MORBID OBESITY WITH BMI OF 50.0-59.9, ADULT (HCC): ICD-10-CM

## 2024-04-22 DIAGNOSIS — Z00.00 MEDICARE ANNUAL WELLNESS VISIT, SUBSEQUENT: Primary | ICD-10-CM

## 2024-04-22 DIAGNOSIS — Z23 NEED FOR PNEUMOCOCCAL 20-VALENT CONJUGATE VACCINATION: ICD-10-CM

## 2024-04-22 DIAGNOSIS — G62.9 NEUROPATHY: ICD-10-CM

## 2024-04-22 DIAGNOSIS — E11.65 UNCONTROLLED TYPE 2 DIABETES MELLITUS WITH HYPERGLYCEMIA (HCC): ICD-10-CM

## 2024-04-22 DIAGNOSIS — K76.0 FATTY LIVER: ICD-10-CM

## 2024-04-22 DIAGNOSIS — E78.00 PURE HYPERCHOLESTEROLEMIA: ICD-10-CM

## 2024-04-22 PROCEDURE — G0438 PPPS, INITIAL VISIT: HCPCS | Performed by: STUDENT IN AN ORGANIZED HEALTH CARE EDUCATION/TRAINING PROGRAM

## 2024-04-22 PROCEDURE — 1123F ACP DISCUSS/DSCN MKR DOCD: CPT | Performed by: STUDENT IN AN ORGANIZED HEALTH CARE EDUCATION/TRAINING PROGRAM

## 2024-04-22 PROCEDURE — 99214 OFFICE O/P EST MOD 30 MIN: CPT | Performed by: STUDENT IN AN ORGANIZED HEALTH CARE EDUCATION/TRAINING PROGRAM

## 2024-04-22 PROCEDURE — 3077F SYST BP >= 140 MM HG: CPT | Performed by: STUDENT IN AN ORGANIZED HEALTH CARE EDUCATION/TRAINING PROGRAM

## 2024-04-22 PROCEDURE — 3079F DIAST BP 80-89 MM HG: CPT | Performed by: STUDENT IN AN ORGANIZED HEALTH CARE EDUCATION/TRAINING PROGRAM

## 2024-04-22 PROCEDURE — G0009 ADMIN PNEUMOCOCCAL VACCINE: HCPCS | Performed by: STUDENT IN AN ORGANIZED HEALTH CARE EDUCATION/TRAINING PROGRAM

## 2024-04-22 PROCEDURE — 3052F HG A1C>EQUAL 8.0%<EQUAL 9.0%: CPT | Performed by: STUDENT IN AN ORGANIZED HEALTH CARE EDUCATION/TRAINING PROGRAM

## 2024-04-22 PROCEDURE — 90677 PCV20 VACCINE IM: CPT | Performed by: STUDENT IN AN ORGANIZED HEALTH CARE EDUCATION/TRAINING PROGRAM

## 2024-04-22 SDOH — ECONOMIC STABILITY: INCOME INSECURITY: HOW HARD IS IT FOR YOU TO PAY FOR THE VERY BASICS LIKE FOOD, HOUSING, MEDICAL CARE, AND HEATING?: NOT HARD AT ALL

## 2024-04-22 SDOH — ECONOMIC STABILITY: FOOD INSECURITY: WITHIN THE PAST 12 MONTHS, YOU WORRIED THAT YOUR FOOD WOULD RUN OUT BEFORE YOU GOT MONEY TO BUY MORE.: NEVER TRUE

## 2024-04-22 SDOH — ECONOMIC STABILITY: FOOD INSECURITY: WITHIN THE PAST 12 MONTHS, THE FOOD YOU BOUGHT JUST DIDN'T LAST AND YOU DIDN'T HAVE MONEY TO GET MORE.: NEVER TRUE

## 2024-04-22 ASSESSMENT — PATIENT HEALTH QUESTIONNAIRE - PHQ9
SUM OF ALL RESPONSES TO PHQ9 QUESTIONS 1 & 2: 0
4. FEELING TIRED OR HAVING LITTLE ENERGY: NOT AT ALL
8. MOVING OR SPEAKING SO SLOWLY THAT OTHER PEOPLE COULD HAVE NOTICED. OR THE OPPOSITE, BEING SO FIGETY OR RESTLESS THAT YOU HAVE BEEN MOVING AROUND A LOT MORE THAN USUAL: NOT AT ALL
SUM OF ALL RESPONSES TO PHQ QUESTIONS 1-9: 0
1. LITTLE INTEREST OR PLEASURE IN DOING THINGS: NOT AT ALL
SUM OF ALL RESPONSES TO PHQ QUESTIONS 1-9: 0
SUM OF ALL RESPONSES TO PHQ QUESTIONS 1-9: 0
6. FEELING BAD ABOUT YOURSELF - OR THAT YOU ARE A FAILURE OR HAVE LET YOURSELF OR YOUR FAMILY DOWN: NOT AT ALL
2. FEELING DOWN, DEPRESSED OR HOPELESS: NOT AT ALL
5. POOR APPETITE OR OVEREATING: NOT AT ALL
SUM OF ALL RESPONSES TO PHQ QUESTIONS 1-9: 0
3. TROUBLE FALLING OR STAYING ASLEEP: NOT AT ALL
9. THOUGHTS THAT YOU WOULD BE BETTER OFF DEAD, OR OF HURTING YOURSELF: NOT AT ALL
10. IF YOU CHECKED OFF ANY PROBLEMS, HOW DIFFICULT HAVE THESE PROBLEMS MADE IT FOR YOU TO DO YOUR WORK, TAKE CARE OF THINGS AT HOME, OR GET ALONG WITH OTHER PEOPLE: NOT DIFFICULT AT ALL
7. TROUBLE CONCENTRATING ON THINGS, SUCH AS READING THE NEWSPAPER OR WATCHING TELEVISION: NOT AT ALL

## 2024-04-22 ASSESSMENT — LIFESTYLE VARIABLES: HOW OFTEN DO YOU HAVE A DRINK CONTAINING ALCOHOL: MONTHLY OR LESS

## 2024-04-22 NOTE — ASSESSMENT & PLAN NOTE
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  Can continue meloxicam as needed, discussed with patient to avoid taking this medication on a regular basis as it can cause potential side effects and complication such as CKD, gastric ulcers.  -Need better blood sugar control  -Discussed physical therapy, patient would like to hold off that she is going to see her Ortho in a couple weeks  -Can try over-the-counter topical treatments such as lidocaine patch capsaicin or Salonpas  -If worsen, will consider EMG, nerve conduction study

## 2024-04-22 NOTE — ASSESSMENT & PLAN NOTE
Need to work on weight.  We discussed healthy diet and exercise.  She is also has been able to lose about 10 to 15 pounds since January.

## 2024-04-22 NOTE — PATIENT INSTRUCTIONS
week.  It improves your energy and can help you sleep better.  Muscle-strengthening.  This type of activity can help maintain muscle and strengthen bones.  Includes climbing stairs, using resistance bands, and lifting or carrying heavy loads.  Aim for at least twice a week.  It can help protect the knees and other joints.  Stretching.  Stretching gives you better range of motion in joints and muscles.  Includes upper arm stretches, calf stretches, and gentle yoga.  Aim for at least twice a week, preferably after your muscles are warmed up from other activities.  It can help you function better in daily life.  Balancing.  This helps you stay coordinated and have good posture.  Includes heel-to-toe walking, ramiro chi, and certain types of yoga.  Aim for at least 3 days a week.  It can reduce your risk of falling.  Even if you have a hard time meeting the recommendations, it's better to be more active than less active. All activity done in each category counts toward your weekly total. You'd be surprised how daily things like carrying groceries, keeping up with grandchildren, and taking the stairs can add up.  What keeps you from being active?  If you've had a hard time being more active, you're not alone. Maybe you remember being able to do more. Or maybe you've never thought of yourself as being active. It's frustrating when you can't do the things you want. Being more active can help. What's holding you back?  Getting started.  Have a goal, but break it into easy tasks. Small steps build into big accomplishments.  Staying motivated.  If you feel like skipping your activity, remember your goal. Maybe you want to move better and stay independent. Every activity gets you one step closer.  Not feeling your best.  Start with 5 minutes of an activity you enjoy. Prove to yourself you can do it. As you get comfortable, increase your time.  You may not be where you want to be. But you're in the process of getting there.

## 2024-04-22 NOTE — ASSESSMENT & PLAN NOTE
Uncontrolled. Not yet taking chothalidone 50 mg (increased from at last visit from 25 mg).   -Start taking chlorthalidone 50 mg daily.   -She is also on clonidine 0.2 mg twice daily, verapamil to 40 mg nightly.   -Continue to monitor blood pressure at home, follow-up in 3 months  -Did not tolerate ACE inhibitor  -She is also on chronic NSAIDs.  Discussed with patient, reduce it to only as needed

## 2024-04-22 NOTE — PROGRESS NOTES
Medicare Annual Wellness Visit    Pia El is here for Annual Exam and Medicare AWV    Assessment & Plan   Medicare annual wellness visit, subsequent  Assessment & Plan:   Within normal limits for age- cont to work no ADL issues,immunizations up to date (Prevnar 20), no depression ,no cognitive impairment  Colonoscopy up to date, repeat soon, has scheduled   Eye exam up to date, every 6 months   Eye exam up to date- diabetic retinopathy, cataract  Exercises as tolerated    ACP information provided   Findings and recommendations discussed with Pt  Pending labs     Orders:  -     Lipid, Fasting; Future  -     TSH with Reflex; Future  -     Comprehensive Metabolic Panel; Future  -     CBC with Auto Differential; Future  Morbid obesity with BMI of 50.0-59.9, adult (HCC)  Assessment & Plan:  Need to work on weight.  We discussed healthy diet and exercise.  She is also has been able to lose about 10 to 15 pounds since January.  Orders:  -     Lipid, Fasting; Future  -     TSH with Reflex; Future  -     Comprehensive Metabolic Panel; Future  -     CBC with Auto Differential; Future  Uncontrolled type 2 diabetes mellitus with hyperglycemia (HCC)  Assessment & Plan:  Improving but still uncontrolled.  Last A1c in February 2024 was 8.8%.  Continue to follow-up with Endo.  On long-term insulin therapy.  Continue with current medication.  Continue CGM, denies any hypoglycemic episodes  Eye exam up-to-date, 3/2020 for diabetic retinopathy getting injections  Foot exam is due  On statin  BP is uncontrolled, not able, see above    Orders:  -     Lipid, Fasting; Future  -     TSH with Reflex; Future  -     Comprehensive Metabolic Panel; Future  -     CBC with Auto Differential; Future  Pure hypercholesterolemia  Assessment & Plan:   Continue with high intensity statin, atorvastatin 40 mg.  Check labs  Orders:  -     Lipid, Fasting; Future  -     TSH with Reflex; Future  -     Comprehensive Metabolic Panel; Future  -     CBC

## 2024-04-22 NOTE — ASSESSMENT & PLAN NOTE
Improving but still uncontrolled.  Last A1c in February 2024 was 8.8%.  Continue to follow-up with Endo.  On long-term insulin therapy.  Continue with current medication.  Continue CGM, denies any hypoglycemic episodes  Eye exam up-to-date, 3/2020 for diabetic retinopathy getting injections  Foot exam is due  On statin  BP is uncontrolled, not able, see above

## 2024-04-22 NOTE — ASSESSMENT & PLAN NOTE
Within normal limits for age- cont to work no ADL issues,immunizations up to date (Prevnar 20), no depression ,no cognitive impairment  Colonoscopy up to date, repeat soon, has scheduled   Eye exam up to date, every 6 months   Eye exam up to date- diabetic retinopathy, cataract  Exercises as tolerated    ACP information provided   Findings and recommendations discussed with Pt  Pending labs

## 2024-05-03 ENCOUNTER — OFFICE VISIT (OUTPATIENT)
Dept: ORTHOPEDIC SURGERY | Age: 69
End: 2024-05-03
Payer: MEDICARE

## 2024-05-03 VITALS — WEIGHT: 293 LBS | BODY MASS INDEX: 45.99 KG/M2 | HEIGHT: 67 IN

## 2024-05-03 DIAGNOSIS — M16.0 PRIMARY OSTEOARTHRITIS OF BOTH HIPS: ICD-10-CM

## 2024-05-03 DIAGNOSIS — M25.851 HIP IMPINGEMENT SYNDROME, RIGHT: ICD-10-CM

## 2024-05-03 DIAGNOSIS — M25.551 HIP PAIN, BILATERAL: ICD-10-CM

## 2024-05-03 DIAGNOSIS — M25.552 HIP PAIN, BILATERAL: ICD-10-CM

## 2024-05-03 DIAGNOSIS — M25.852 HIP IMPINGEMENT SYNDROME, LEFT: Primary | ICD-10-CM

## 2024-05-03 PROCEDURE — 1123F ACP DISCUSS/DSCN MKR DOCD: CPT | Performed by: ORTHOPAEDIC SURGERY

## 2024-05-03 PROCEDURE — 99213 OFFICE O/P EST LOW 20 MIN: CPT | Performed by: ORTHOPAEDIC SURGERY

## 2024-05-03 NOTE — PROGRESS NOTES
COLONOSCOPY      2012 Dr. Missael Segovia 5 yrs    COLONOSCOPY      repeat x 3 yrs HAD 5 POLPYS    COLONOSCOPY N/A 2023    COLONOSCOPY POLYPECTOMY ABLATION performed by Cam Reynolds MD at [facility] ENDOSCOPY    HYSTERECTOMY (CERVIX STATUS UNKNOWN)      HYSTEROSCOPY  2015    HYSTEROSCOPY, DILATATION AND CURETTAGE WITH NOVASURE ABLATION AND ABLATION    COMFORT STEREO BREAST BX ADD LESION RIGHT Right 2023    COMFORT STEREO BREAST BX ADD LESION RIGHT 2023 [facility] MOB MAMMOGRAPHY    COMFORT STEROTACTIC LOC BREAST BIOPSY RIGHT Right 2023    COMFORT STEROTACTIC LOC BREAST BIOPSY RIGHT 2023 [facility] MOB MAMMOGRAPHY    MYOMECTOMY      2003    OVARY REMOVAL      TIBIA FRACTURE SURGERY      2006 w/ revision     Family History   Problem Relation Age of Onset    Uterine Cancer Mother 45    Cancer Father         leukemia     Social History     Tobacco Use    Smoking status: Former     Current packs/day: 0.00     Average packs/day: 0.5 packs/day for 10.0 years (5.0 ttl pk-yrs)     Types: Cigarettes     Start date: 1985     Quit date: 1995     Years since quittin.9    Smokeless tobacco: Never   Substance Use Topics    Alcohol use: Not Currently     Comment: once a month      Current Outpatient Medications on File Prior to Visit   Medication Sig Dispense Refill    meloxicam (MOBIC) 15 MG tablet Take 1 tablet by mouth daily as needed for Pain 30 tablet 0    chlorthalidone (HYGROTEN) 50 MG tablet Take 1 tablet by mouth daily 90 tablet 1    tiZANidine (ZANAFLEX) 2 MG tablet Take 1 tablet by mouth nightly as needed (muscle pain and spasm) 10 tablet 0    omeprazole (PRILOSEC) 40 MG delayed release capsule Take 1 capsule by mouth every morning (before breakfast) 90 capsule 1    meloxicam (MOBIC) 15 MG tablet TAKE 1 TABLET BY MOUTH DAILY 90 tablet 3    verapamil (CALAN SR) 240 MG extended release tablet Take 1 tablet by mouth nightly 90 tablet 3    atorvastatin (LIPITOR) 40 MG tablet TAKE 1 TABLET BY MOUTH

## 2024-05-06 RX ORDER — MELOXICAM 15 MG/1
15 TABLET ORAL DAILY PRN
Qty: 30 TABLET | Refills: 1 | Status: SHIPPED | OUTPATIENT
Start: 2024-05-06

## 2024-05-10 ENCOUNTER — ANESTHESIA (OUTPATIENT)
Dept: ENDOSCOPY | Age: 69
End: 2024-05-10
Payer: MEDICARE

## 2024-05-10 ENCOUNTER — HOSPITAL ENCOUNTER (OUTPATIENT)
Age: 69
Setting detail: OUTPATIENT SURGERY
Discharge: HOME | End: 2024-05-10
Attending: INTERNAL MEDICINE | Admitting: INTERNAL MEDICINE
Payer: MEDICARE

## 2024-05-10 ENCOUNTER — ANESTHESIA EVENT (OUTPATIENT)
Dept: ENDOSCOPY | Age: 69
End: 2024-05-10
Payer: MEDICARE

## 2024-05-10 VITALS
SYSTOLIC BLOOD PRESSURE: 146 MMHG | HEART RATE: 79 BPM | HEIGHT: 67 IN | OXYGEN SATURATION: 97 % | TEMPERATURE: 97.6 F | BODY MASS INDEX: 45.99 KG/M2 | RESPIRATION RATE: 16 BRPM | WEIGHT: 293 LBS | DIASTOLIC BLOOD PRESSURE: 73 MMHG

## 2024-05-10 DIAGNOSIS — Z86.010 PERSONAL HISTORY OF COLONIC POLYPS: ICD-10-CM

## 2024-05-10 DIAGNOSIS — Z80.0 FAMILY HISTORY OF COLON CANCER: ICD-10-CM

## 2024-05-10 DIAGNOSIS — Z12.11 SCREEN FOR COLON CANCER: ICD-10-CM

## 2024-05-10 LAB
GLUCOSE BLD-MCNC: 232 MG/DL (ref 70–99)
PERFORMED ON: ABNORMAL

## 2024-05-10 PROCEDURE — 88305 TISSUE EXAM BY PATHOLOGIST: CPT

## 2024-05-10 PROCEDURE — 3700000000 HC ANESTHESIA ATTENDED CARE: Performed by: INTERNAL MEDICINE

## 2024-05-10 PROCEDURE — 3700000001 HC ADD 15 MINUTES (ANESTHESIA): Performed by: INTERNAL MEDICINE

## 2024-05-10 PROCEDURE — 2500000003 HC RX 250 WO HCPCS: Performed by: NURSE ANESTHETIST, CERTIFIED REGISTERED

## 2024-05-10 PROCEDURE — 2709999900 HC NON-CHARGEABLE SUPPLY: Performed by: INTERNAL MEDICINE

## 2024-05-10 PROCEDURE — 3609010600 HC COLONOSCOPY POLYPECTOMY SNARE/COLD BIOPSY: Performed by: INTERNAL MEDICINE

## 2024-05-10 PROCEDURE — 7100000010 HC PHASE II RECOVERY - FIRST 15 MIN: Performed by: INTERNAL MEDICINE

## 2024-05-10 PROCEDURE — 7100000011 HC PHASE II RECOVERY - ADDTL 15 MIN: Performed by: INTERNAL MEDICINE

## 2024-05-10 PROCEDURE — 6360000002 HC RX W HCPCS: Performed by: NURSE ANESTHETIST, CERTIFIED REGISTERED

## 2024-05-10 PROCEDURE — 2580000003 HC RX 258: Performed by: ANESTHESIOLOGY

## 2024-05-10 RX ORDER — GABAPENTIN 100 MG/1
100 CAPSULE ORAL 3 TIMES DAILY PRN
COMMUNITY

## 2024-05-10 RX ORDER — SODIUM CHLORIDE, SODIUM LACTATE, POTASSIUM CHLORIDE, CALCIUM CHLORIDE 600; 310; 30; 20 MG/100ML; MG/100ML; MG/100ML; MG/100ML
INJECTION, SOLUTION INTRAVENOUS CONTINUOUS
Status: DISCONTINUED | OUTPATIENT
Start: 2024-05-10 | End: 2024-05-10 | Stop reason: HOSPADM

## 2024-05-10 RX ORDER — GLYCOPYRROLATE 0.2 MG/ML
INJECTION INTRAMUSCULAR; INTRAVENOUS PRN
Status: DISCONTINUED | OUTPATIENT
Start: 2024-05-10 | End: 2024-05-10 | Stop reason: SDUPTHER

## 2024-05-10 RX ORDER — LIDOCAINE HYDROCHLORIDE 20 MG/ML
INJECTION, SOLUTION EPIDURAL; INFILTRATION; INTRACAUDAL; PERINEURAL PRN
Status: DISCONTINUED | OUTPATIENT
Start: 2024-05-10 | End: 2024-05-10 | Stop reason: SDUPTHER

## 2024-05-10 RX ORDER — CLONIDINE HYDROCHLORIDE 0.1 MG/1
0.1 TABLET ORAL 2 TIMES DAILY
COMMUNITY

## 2024-05-10 RX ORDER — PROPOFOL 10 MG/ML
INJECTION, EMULSION INTRAVENOUS PRN
Status: DISCONTINUED | OUTPATIENT
Start: 2024-05-10 | End: 2024-05-10 | Stop reason: SDUPTHER

## 2024-05-10 RX ADMIN — LIDOCAINE HYDROCHLORIDE 100 MG: 20 INJECTION, SOLUTION EPIDURAL; INFILTRATION; INTRACAUDAL; PERINEURAL at 07:37

## 2024-05-10 RX ADMIN — SODIUM CHLORIDE, POTASSIUM CHLORIDE, SODIUM LACTATE AND CALCIUM CHLORIDE: 600; 310; 30; 20 INJECTION, SOLUTION INTRAVENOUS at 06:57

## 2024-05-10 RX ADMIN — PROPOFOL 70 MG: 10 INJECTION, EMULSION INTRAVENOUS at 07:37

## 2024-05-10 RX ADMIN — PROPOFOL 150 MCG/KG/MIN: 10 INJECTION, EMULSION INTRAVENOUS at 07:38

## 2024-05-10 RX ADMIN — GLYCOPYRROLATE 0.2 MG: 0.2 INJECTION INTRAMUSCULAR; INTRAVENOUS at 07:30

## 2024-05-10 ASSESSMENT — ENCOUNTER SYMPTOMS: SHORTNESS OF BREATH: 1

## 2024-05-10 ASSESSMENT — PAIN SCALES - GENERAL
PAINLEVEL_OUTOF10: 0

## 2024-05-10 ASSESSMENT — PAIN - FUNCTIONAL ASSESSMENT: PAIN_FUNCTIONAL_ASSESSMENT: 0-10

## 2024-05-10 NOTE — DISCHARGE INSTRUCTIONS
Recommendations:  Await pathology results  High-fiber diet with daily Metamucil        ENDOSCOPY DISCHARGE INSTRUCTIONS:    Call the physician that did your procedure for any questions or concern:    [facility]: [phone]  DR. EVERT SIMENTAL      ACTIVITY:    There are potential side effects to the medications used for sedation and anesthesia during your procedure.  These include:  Dizziness or light-headedness, confusion or memory loss, delayed reaction times, loss of coordination, nausea and vomiting.  Because of your increased risk for injury, we ask that you observe the following precautions:  For the next 24 hours,  DO NOT operate an automobile, bicycle, motorcycle, , power tools or large equipment of any kind.  Do not drink alcohol, sign any legal documents or make any legal decisions for 24 hours.  Do not bend your head over lower than your heart.  DO sit on the side of bed/couch awhile before getting up.  Plan on bedrest or quiet relaxation today.  You may resume normal activities in 24 hours.    DIET:    Your first meal today should be light, avoiding spicy and fatty foods.  If you tolerate this first meal, then you may advance to your regular diet unless otherwise advised by your physician.    NORMAL SYMPTOMS:  -Mild sore throat if you’ve had an EGD   -Gaseous discomfort    NOTIFY YOUR PHYSICIAN IF THESE SYMPTOMS OCCUR:  1. Fever (greater than 100)  5. Increased abdominal bloating  2. Severe pain    6. Excessive bleeding  3. Nausea and vomiting  7. Chest pain                                                                    4. Chills    8. Shortness of breath    ADDITIONAL INSTRUCTIONS:    Biopsy results: Call [facility] for biopsy results in 1 week       Colon Polyps: Care Instructions  Your Care Instructions     Colon polyps are growths in the colon or the rectum. The cause of most colon polyps is not known, and most people who get them do not have any problems. But a certain kind can  turn into cancer. For this reason, regular testing for colon polyps is important for people as they get older. It is also important for anyone who has an increased risk for colon cancer.  Polyps are usually found through routine colon cancer screening tests. Although most colon polyps are not cancerous, they are usually removed and then tested for cancer. Screening for colon cancer saves lives because the cancer can usually be cured if it is caught early.  If you have a polyp that is the type that can turn into cancer, you may need more tests to examine your entire colon. The doctor will remove any other polyps that are found, and you will be tested more often.  Follow-up care is a key part of your treatment and safety. Be sure to make and go to all appointments, and call your doctor if you are having problems. It's also a good idea to know your test results and keep a list of the medicines you take.  How can you care for yourself at home?  Regular exams to look for colon polyps are the best way to prevent polyps from turning into colon cancer. These can include stool tests, sigmoidoscopy, colonoscopy, and CT colonography. Talk with your doctor about a testing schedule that is right for you.  To prevent polyps  There is no home treatment that can prevent colon polyps. But these steps may help lower your risk for cancer.  Stay active. Being active can help you get to and stay at a healthy weight. Try to exercise on most days of the week. Walking is a good choice.  Eat well. Choose a variety of vegetables, fruits, legumes (such as peas and beans), fish, poultry, and whole grains.  Do not smoke. If you need help quitting, talk to your doctor about stop-smoking programs and medicines. These can increase your chances of quitting for good.  If you drink alcohol, limit how much you drink. Limit alcohol to 2 drinks a day for men and 1 drink a day for women.  When should you call for help?   Call your doctor now or seek

## 2024-05-10 NOTE — ANESTHESIA POSTPROCEDURE EVALUATION
Department of Anesthesiology  Postprocedure Note    Patient: Pia El  MRN: [id number]  YOB: 1955  Date of evaluation: 5/10/2024    Procedure Summary       Date: 05/10/24 Room / Location: [place] [address] / [facility]    Anesthesia Start: 0730 Anesthesia Stop: 0758    Procedure: COLONOSCOPY POLYPECTOMY SNARE BIOPSY Diagnosis:       Screen for colon cancer      Personal history of colonic polyps      Family history of colon cancer      (Screen for colon cancer [Z12.11])      (Personal history of colonic polyps [Z86.010])      (Family history of colon cancer [Z80.0])    Surgeons: Cam Reynolds MD Responsible Provider: Simon Olivares MD    Anesthesia Type: MAC ASA Status: 3            Anesthesia Type: No value filed.    Ojhann Phase I: Johann Score: 10    Johann Phase II: Johann Score: 10    Anesthesia Post Evaluation    Patient location during evaluation: PACU  Patient participation: complete - patient participated  Level of consciousness: awake  Airway patency: patent  Nausea & Vomiting: no nausea and no vomiting  Cardiovascular status: blood pressure returned to baseline and hemodynamically stable  Respiratory status: acceptable  Hydration status: euvolemic  Multimodal analgesia pain management approach  Pain management: adequate    No notable events documented.

## 2024-05-10 NOTE — H&P
Gastroenterology Note                 Pre-operative History and Physical    Patient: Pia El  : 1955  CSN:     History Obtained From:   Patient or guardian.      HISTORY OF PRESENT ILLNESS:    The patient is a 68 y.o. female here colonoscopy for Phx colon polyps.  Many large polyps     Past Medical History:    Past Medical History:   Diagnosis Date    Anxiety and depression     Asthma     Edema     GERD (gastroesophageal reflux disease)     Hyperlipidemia     Hypertension     IBS (irritable bowel syndrome)     Irregular menstrual bleeding 2011    Dr. Law     Morbid obesity (HCC)     Multinodular goiter 2010    Personal history of colonic polyps     Dr. Allison- due for repeat colonoscopy     Positive PPD, treated     INH completed 2001    S/P cholecystectomy 2011    Type II or unspecified type diabetes mellitus without mention of complication, not stated as uncontrolled      Past Surgical History:    Past Surgical History:   Procedure Laterality Date     SECTION N/A 30 plus years ago    x2    CHOLECYSTECTOMY      3/2009    COLONOSCOPY      2012 Dr. Missael Allison-jamie 5 yrs    COLONOSCOPY      repeat x 3 yrs HAD 5 POLPYS    COLONOSCOPY N/A 2023    COLONOSCOPY POLYPECTOMY ABLATION performed by Cam Reynolds MD at [facility] ENDOSCOPY    HYSTERECTOMY (CERVIX STATUS UNKNOWN)      HYSTEROSCOPY  2015    HYSTEROSCOPY, DILATATION AND CURETTAGE WITH NOVASURE ABLATION AND ABLATION    COMFORT STEREO BREAST BX ADD LESION RIGHT Right 2023    COMFORT STEREO BREAST BX ADD LESION RIGHT 2023 [facility] MOB MAMMOGRAPHY    COMFORT STEROTACTIC LOC BREAST BIOPSY RIGHT Right 2023    COMFORT STEROTACTIC LOC BREAST BIOPSY RIGHT 2023 [facility] MOB MAMMOGRAPHY    MYOMECTOMY      2003    OVARY REMOVAL      TIBIA FRACTURE SURGERY      2006 w/ revision     Medications Prior to Admission:   No current facility-administered medications on file prior to encounter.     Current

## 2024-05-10 NOTE — PROGRESS NOTES
Ambulatory Surgery/Procedure Discharge Note    Vitals:    05/10/24 0809   BP: 135/66   Pulse: 77   Resp: 16   Temp:    SpO2: 100%       In: 400 [I.V.:400]  Out: -     Restroom use offered before discharge.  Yes    Pain assessment:  level of pain (1-10, 10 severe),   Pain Level: 0    Pt and S.O./family states \"ready to go home\". Pt alert and oriented x4. IV removed. Denies N/V or pain.  Pt tolerating po intake. Discharge instructions given to pt and spouse with pt permission. Pt and spouse verbalized understanding of all instructions. Left with all belongings,  and discharge instructions.     Patient discharged to home/self care. Patient discharged via wheel chair by transporter to waiting family/S.O.       5/10/2024 8:10 AM

## 2024-05-10 NOTE — ANESTHESIA PRE PROCEDURE
Department of Anesthesiology  Preprocedure Note       Name:  Pia El   Age:  68 y.o.  :  1955                                          MRN:  [id number]         Date:  5/10/2024      Surgeon: Surgeon(s):  Cam Reynolds MD    Procedure: Procedure(s):  COLONOSCOPY    Medications prior to admission:   Prior to Admission medications    Medication Sig Start Date End Date Taking? Authorizing Provider   gabapentin (NEURONTIN) 100 MG capsule Take 1 capsule by mouth 3 times daily as needed (pain).   Yes Provider, MD Maggie   cloNIDine (CATAPRES) 0.1 MG tablet Take 1 tablet by mouth 2 times daily   Yes Provider, MD Maggie   meloxicam (MOBIC) 15 MG tablet TAKE 1 TABLET BY MOUTH DAILY AS  NEEDED FOR PAIN 24   Meena Castelan MD   chlorthalidone (HYGROTEN) 50 MG tablet Take 1 tablet by mouth daily 24   Meena Castelan MD   tiZANidine (ZANAFLEX) 2 MG tablet Take 1 tablet by mouth nightly as needed (muscle pain and spasm) 3/13/24   Meena Castelan MD   omeprazole (PRILOSEC) 40 MG delayed release capsule Take 1 capsule by mouth every morning (before breakfast) 3/11/24   Meena Castelan MD   meloxicam (MOBIC) 15 MG tablet TAKE 1 TABLET BY MOUTH DAILY 24   Carroll Castillo PA-C   verapamil (CALAN SR) 240 MG extended release tablet Take 1 tablet by mouth nightly 24   Meena Castelan MD   atorvastatin (LIPITOR) 40 MG tablet TAKE 1 TABLET BY MOUTH DAILY 23   Meena Castelan MD   FLUoxetine (PROZAC) 40 MG capsule TAKE 1 CAPSULE BY MOUTH DAILY 23   Meena Castelan MD   Continuous Blood Gluc  (FREESTYLE MING 2 READER) YOANDY To check glucose levels 23   Leonel Porras MD   Continuous Blood Gluc  (FREESTYLE MING 2 READER) YOANDY To check glucose levels 23   Leonel Porras MD   busPIRone (BUSPAR) 15 MG tablet TAKE 1 TABLET THREE TIMES A DAY AS NEEDED FOR ANXIETY 10/11/23   Meena Castelan MD   losartan (COZAAR) 100 MG tablet Take 1 tablet by mouth

## 2024-05-10 NOTE — PROCEDURES
[facility]  Colonoscopy Note    Patient: Pia El  : 1955  Acct#:     Procedure: Colonoscopy with polypectomy (cold snare)    Date:  5/10/2024    Surgeon:  CAM REYNOLDS MD    Referring Physician:  Cam Reynolds MD    Anesthesia: IV propofol, per anesthesia    EBL: <50 mL    Indications: Personal history of advanced colon polyps with many large polyps taken out     Procedure:     An informed consent was obtained from the patient after explanation of indications, benefits, possible risks and complications of the procedure.  The patient was then taken to the endoscopy suite, placed in the left lateral decubitus position, and the above IV anesthesia was administered.    A digital rectal examination was performed and revealed negative without mass, lesions or tenderness.      The Olympus PCFQ-H190 video colonoscope was placed in the patient's rectum under digital direction and advanced to the cecum. The cecum was identified by characteristic anatomy and ballottment.  The prep was adequate.      Findings:  2 sessile polyps measuring 2 to 4 mm in the ascending colon removed via cold snare polypectomy.  Diverticulosis  Medium internal hemorrhoids    The scope was then withdrawn into the rectum and retroflexed.  The retroflexed view of the anal verge and rectum demonstrates medium hemorrhoids.     The scope was straightened, the colon was decompressed and the scope was withdrawn from the patient.      The patient tolerated the procedure well and was taken to the PACU in good condition.    Biopsies: yes      Impression:   2 sessile polyps measuring 2 to 4 mm in the ascending colon removed via cold snare polypectomy.  Diverticulosis  Medium internal hemorrhoids    Recommendations:  Await pathology results  High-fiber diet with daily Metamucil    Cam Reynolds MD  [facility]

## 2024-05-17 ENCOUNTER — HOSPITAL ENCOUNTER (OUTPATIENT)
Dept: MAMMOGRAPHY | Age: 69
Discharge: HOME | End: 2024-05-17
Payer: MEDICARE

## 2024-05-17 VITALS — WEIGHT: 293 LBS | HEIGHT: 67 IN | BODY MASS INDEX: 45.99 KG/M2

## 2024-05-17 DIAGNOSIS — Z12.31 VISIT FOR SCREENING MAMMOGRAM: ICD-10-CM

## 2024-05-17 PROCEDURE — 77063 BREAST TOMOSYNTHESIS BI: CPT

## 2024-05-22 PROBLEM — Z00.00 MEDICARE ANNUAL WELLNESS VISIT, SUBSEQUENT: Status: RESOLVED | Noted: 2024-04-22 | Resolved: 2024-05-22

## 2024-06-01 ENCOUNTER — HOSPITAL ENCOUNTER (OUTPATIENT)
Dept: MAMMOGRAPHY | Age: 69
Discharge: HOME | End: 2024-06-01

## 2024-06-01 DIAGNOSIS — Z12.31 VISIT FOR SCREENING MAMMOGRAM: ICD-10-CM

## 2024-06-01 DIAGNOSIS — E78.00 PURE HYPERCHOLESTEROLEMIA: ICD-10-CM

## 2024-06-01 DIAGNOSIS — Z00.00 MEDICARE ANNUAL WELLNESS VISIT, SUBSEQUENT: ICD-10-CM

## 2024-06-01 DIAGNOSIS — E11.65 UNCONTROLLED TYPE 2 DIABETES MELLITUS WITH HYPERGLYCEMIA (HCC): ICD-10-CM

## 2024-06-01 DIAGNOSIS — E66.01 MORBID OBESITY WITH BMI OF 50.0-59.9, ADULT (HCC): ICD-10-CM

## 2024-06-01 LAB
ALBUMIN SERPL-MCNC: 4 G/DL (ref 3.4–5)
ALBUMIN/GLOB SERPL: 1.3 {RATIO} (ref 1.1–2.2)
ALP SERPL-CCNC: 186 U/L (ref 40–129)
ALT SERPL-CCNC: 13 U/L (ref 10–40)
ANION GAP SERPL CALCULATED.3IONS-SCNC: 14 MMOL/L (ref 3–16)
AST SERPL-CCNC: 8 U/L (ref 15–37)
BASOPHILS # BLD: 0 K/UL (ref 0–0.2)
BASOPHILS NFR BLD: 0.4 %
BILIRUB SERPL-MCNC: 0.6 MG/DL (ref 0–1)
BUN SERPL-MCNC: 7 MG/DL (ref 7–20)
CALCIUM SERPL-MCNC: 9.7 MG/DL (ref 8.3–10.6)
CHLORIDE SERPL-SCNC: 99 MMOL/L (ref 99–110)
CHOLEST SERPL-MCNC: 141 MG/DL (ref 0–199)
CO2 SERPL-SCNC: 28 MMOL/L (ref 21–32)
CREAT SERPL-MCNC: 0.7 MG/DL (ref 0.6–1.2)
DEPRECATED RDW RBC AUTO: 13.4 % (ref 12.4–15.4)
EOSINOPHIL # BLD: 0.2 K/UL (ref 0–0.6)
EOSINOPHIL NFR BLD: 2.1 %
GFR SERPLBLD CREATININE-BSD FMLA CKD-EPI: >90 ML/MIN/{1.73_M2}
GLUCOSE SERPL-MCNC: 227 MG/DL (ref 70–99)
HCT VFR BLD AUTO: 42.2 % (ref 36–48)
HDLC SERPL-MCNC: 65 MG/DL (ref 40–60)
HGB BLD-MCNC: 14.5 G/DL (ref 12–16)
LDL CHOLESTEROL: 62 MG/DL
LYMPHOCYTES # BLD: 3.1 K/UL (ref 1–5.1)
LYMPHOCYTES NFR BLD: 33.5 %
MCH RBC QN AUTO: 31 PG (ref 26–34)
MCHC RBC AUTO-ENTMCNC: 34.3 G/DL (ref 31–36)
MCV RBC AUTO: 90.3 FL (ref 80–100)
MONOCYTES # BLD: 0.5 K/UL (ref 0–1.3)
MONOCYTES NFR BLD: 5.3 %
NEUTROPHILS # BLD: 5.3 K/UL (ref 1.7–7.7)
NEUTROPHILS NFR BLD: 58.7 %
PLATELET # BLD AUTO: 243 K/UL (ref 135–450)
PMV BLD AUTO: 11.4 FL (ref 5–10.5)
POTASSIUM SERPL-SCNC: 4 MMOL/L (ref 3.5–5.1)
PROT SERPL-MCNC: 7.1 G/DL (ref 6.4–8.2)
RBC # BLD AUTO: 4.67 M/UL (ref 4–5.2)
SODIUM SERPL-SCNC: 141 MMOL/L (ref 136–145)
TRIGL SERPL-MCNC: 69 MG/DL (ref 0–150)
TSH SERPL DL<=0.005 MIU/L-ACNC: 1.08 UIU/ML (ref 0.27–4.2)
VLDLC SERPL CALC-MCNC: 14 MG/DL
WBC # BLD AUTO: 9.1 K/UL (ref 4–11)

## 2024-06-04 DIAGNOSIS — E11.65 UNCONTROLLED TYPE 2 DIABETES MELLITUS WITH HYPERGLYCEMIA (HCC): Primary | ICD-10-CM

## 2024-06-14 RX ORDER — CYCLOBENZAPRINE HCL 5 MG
10 TABLET ORAL 2 TIMES DAILY PRN
Qty: 20 TABLET | Refills: 0 | Status: SHIPPED | OUTPATIENT
Start: 2024-06-14 | End: 2024-06-24

## 2024-06-14 RX ORDER — BETAMETHASONE DIPROPIONATE 0.05 %
OINTMENT (GRAM) TOPICAL
Qty: 90 G | Refills: 3 | Status: SHIPPED | OUTPATIENT
Start: 2024-06-14

## 2024-06-15 ENCOUNTER — HOSPITAL ENCOUNTER (OUTPATIENT)
Dept: ULTRASOUND IMAGING | Age: 69
Discharge: HOME | End: 2024-06-15
Payer: MEDICARE

## 2024-06-15 DIAGNOSIS — K76.0 FATTY LIVER: ICD-10-CM

## 2024-06-15 PROCEDURE — 76705 ECHO EXAM OF ABDOMEN: CPT

## 2024-06-17 ENCOUNTER — TELEMEDICINE (OUTPATIENT)
Dept: INTERNAL MEDICINE CLINIC | Age: 69
End: 2024-06-17
Payer: MEDICARE

## 2024-06-17 DIAGNOSIS — K76.0 HEPATIC STEATOSIS: ICD-10-CM

## 2024-06-17 DIAGNOSIS — R74.8 ALKALINE PHOSPHATASE ELEVATION: ICD-10-CM

## 2024-06-17 DIAGNOSIS — R93.2 ABNORMAL LIVER ULTRASOUND: ICD-10-CM

## 2024-06-17 DIAGNOSIS — N95.9 POST MENOPAUSAL PROBLEMS: ICD-10-CM

## 2024-06-17 DIAGNOSIS — K76.0 FATTY LIVER: Primary | ICD-10-CM

## 2024-06-17 PROCEDURE — 99214 OFFICE O/P EST MOD 30 MIN: CPT | Performed by: STUDENT IN AN ORGANIZED HEALTH CARE EDUCATION/TRAINING PROGRAM

## 2024-06-17 PROCEDURE — 1123F ACP DISCUSS/DSCN MKR DOCD: CPT | Performed by: STUDENT IN AN ORGANIZED HEALTH CARE EDUCATION/TRAINING PROGRAM

## 2024-06-17 ASSESSMENT — ENCOUNTER SYMPTOMS
SHORTNESS OF BREATH: 0
APNEA: 0
CHOKING: 0
VOMITING: 0
DIARRHEA: 0
COUGH: 0
CONSTIPATION: 0
ABDOMINAL DISTENTION: 0
VOICE CHANGE: 0
TROUBLE SWALLOWING: 0
WHEEZING: 0
NAUSEA: 0
PHOTOPHOBIA: 0
CHEST TIGHTNESS: 0
STRIDOR: 0
ABDOMINAL PAIN: 0

## 2024-06-17 NOTE — PROGRESS NOTES
Pia El, was evaluated through a synchronous (real-time) audio-video encounter. The patient (or guardian if applicable) is aware that this is a billable service, which includes applicable co-pays. This Virtual Visit was conducted with patient's (and/or legal guardian's) consent. Patient identification was verified, and a caregiver was present when appropriate.   The patient was located at Home: [address] [place] [id number]  Provider was located at Facility (Appt Dept): [address] [place] [address], [address]  [place] [address]  Confirm you are appropriately licensed, registered, or certified to deliver care in the state where the patient is located as indicated above. If you are not or unsure, please re-schedule the visit: Yes, I confirm.     Pia El (:  1955) is a Established patient, presenting virtually for evaluation of the following:    Assessment & Plan   Below is the assessment and plan developed based on review of pertinent history, physical exam, labs, studies, and medications.  1. Fatty liver  -     AFL - Stanton Hdz MD, Gastroenterology, [place]  -     Vitamin D 25 Hydroxy; Future  -     Hepatitis C Antibody; Future  -     Gamma GT; Future  -     Ferritin; Future  -     Iron and TIBC; Future  -     Hepatitis B Surface Antibody; Future  2. Body mass index (BMI) 45.0-49.9, adult (HCC)  -     Vitamin D 25 Hydroxy; Future  -     Hepatitis C Antibody; Future  -     Gamma GT; Future  -     Ferritin; Future  -     Iron and TIBC; Future  -     Hepatitis B Surface Antibody; Future  3. Hepatic steatosis  Assessment & Plan:  Repeat US shows worsen as compared to US 6 months ago.  Increased overall echogenicity of the liver, consistent with diffuse hepatocellular disease or fatty infiltration.   - Check labs for hepatitis, iron to start with  - She does have BMI 46.99, DM and hyperlipidemia.  - Will have her see Dr. Eid for further evaluation of the liver 
Vaccine status unknown

## 2024-06-17 NOTE — ASSESSMENT & PLAN NOTE
Repeat US shows worsen as compared to US 6 months ago.  Increased overall echogenicity of the liver, consistent with diffuse hepatocellular disease or fatty infiltration.   - Check labs for hepatitis, iron to start with  - She does have BMI 46.99, DM and hyperlipidemia.  - Will have her see Dr. Eid for further evaluation of the liver

## 2024-06-18 ENCOUNTER — PATIENT MESSAGE (OUTPATIENT)
Dept: INTERNAL MEDICINE CLINIC | Age: 69
End: 2024-06-18

## 2024-06-18 ENCOUNTER — OFFICE VISIT (OUTPATIENT)
Dept: ENDOCRINOLOGY | Age: 69
End: 2024-06-18
Payer: MEDICARE

## 2024-06-18 VITALS
WEIGHT: 293 LBS | HEART RATE: 82 BPM | OXYGEN SATURATION: 100 % | DIASTOLIC BLOOD PRESSURE: 84 MMHG | BODY MASS INDEX: 45.99 KG/M2 | SYSTOLIC BLOOD PRESSURE: 142 MMHG | HEIGHT: 67 IN | RESPIRATION RATE: 15 BRPM

## 2024-06-18 DIAGNOSIS — E11.65 UNCONTROLLED TYPE 2 DIABETES MELLITUS WITH HYPERGLYCEMIA (HCC): Primary | ICD-10-CM

## 2024-06-18 DIAGNOSIS — E04.2 MULTIPLE THYROID NODULES: ICD-10-CM

## 2024-06-18 LAB — HBA1C MFR BLD: 9.8 %

## 2024-06-18 PROCEDURE — 3079F DIAST BP 80-89 MM HG: CPT | Performed by: INTERNAL MEDICINE

## 2024-06-18 PROCEDURE — 3077F SYST BP >= 140 MM HG: CPT | Performed by: INTERNAL MEDICINE

## 2024-06-18 PROCEDURE — 1123F ACP DISCUSS/DSCN MKR DOCD: CPT | Performed by: INTERNAL MEDICINE

## 2024-06-18 PROCEDURE — 99214 OFFICE O/P EST MOD 30 MIN: CPT | Performed by: INTERNAL MEDICINE

## 2024-06-18 PROCEDURE — 3052F HG A1C>EQUAL 8.0%<EQUAL 9.0%: CPT | Performed by: INTERNAL MEDICINE

## 2024-06-18 PROCEDURE — 95251 CONT GLUC MNTR ANALYSIS I&R: CPT | Performed by: INTERNAL MEDICINE

## 2024-06-18 PROCEDURE — 83036 HEMOGLOBIN GLYCOSYLATED A1C: CPT | Performed by: INTERNAL MEDICINE

## 2024-06-18 NOTE — PROGRESS NOTES
Seen as f/u patient for diabetes      Interm:    Glucose higher  C/o right outer thigh pain    May take insulin after meals  Hold insulin if glucose in 80s      Diagnosed with Type 2 diabetes mellitus >10 years ago  Known diabetic complications: Retinopathy NPDR    Current diabetic medications     U-500 40/40/35  units before meals   SSI 5 for 50>150  -20 < 80  Ozempic 2mg  Jardiance 25mg  off of it due to urinary f and yeast    Previous:    U-500 70/80/70  But taking only with lunch n dinner   SSI 5 for 50>150  Trulicity    Moderate, uncontrolled    Previous:    Lantus 70/70    But missing second shot 3 days a week   But doing 66/66  Humalog 38 units AC TID, taking it 2/week    But doing 34   SSI 2 for 50>150  She has relative hypoglycemia in the 70s    H/o use of lantus, novolog, victoza, januvia    Metformin and ER caused GI symptoms    Last A1c  9.8%<---- 8.8%<----9% <-----11%<-----12.3%<---- 9.5%<----- 9.2%<----10.5%<-----9.3%<------9.9%<-----9.2%<------10.2 on 5/17<--- 8.4 on 12/16<-----7.8 on 6/16<------ 9.6 on 2/16<---- 10.5 on 10/15<-----11.8 on 8/15<---14.2 <--- 10.6 <--- 9.4    Prior visit with dietician: Yes   Current diet: on average, 3 meals per day does not follow low CHO  Current exercise: walking   Current monitoring regimen: home blood tests   1-2 times per day  Has brought blood glucose log/meter:   Home blood sugar records:     CGM  Last 2 weeks  Average 208  Target 20 %  80% high  0 % low  127-292    Any episodes of hypoglycemia? 44    No Hx of CAD , PVD, CVA    Hyperlipidemia:controlled on statin LDL 51 on 9/17  Simvastatin 40mg  LDL 58 on 3/20  LDL 49 on 11/21  Last eye exam: 5/24  Last foot exam: 2/24  Last microalbumin to creatinine ratio:1/23    HTN: On multiple medications, taking toprol 50mg?  Losartan 100mg verapamil 240mg Chlorthalidone 25mg clonidine 0.1mg TID  Uncontrolled    She has thyroid nodules, not on medication    Past Medical History:   Diagnosis Date    Anxiety and depression

## 2024-06-19 RX ORDER — SEMAGLUTIDE 2.68 MG/ML
INJECTION, SOLUTION SUBCUTANEOUS
Qty: 9 ML | Refills: 3 | Status: SHIPPED | OUTPATIENT
Start: 2024-06-19

## 2024-06-19 NOTE — TELEPHONE ENCOUNTER
Medication:   Requested Prescriptions     Pending Prescriptions Disp Refills    semaglutide, 2 MG/DOSE, (OZEMPIC, 2 MG/DOSE,) 8 MG/3ML SOPN sc injection 9 mL 3     Simg SC weekly       Last Filled:      Patient Phone Number: [phone] (home)     Last appt: 2024   Next appt: 10/22/2024    Last Labs DM:   Lab Results   Component Value Date/Time    LABA1C 9.8 2024 04:03 PM

## 2024-06-25 NOTE — TELEPHONE ENCOUNTER
"  Call  Dr. Ham [phone]  if you have any problems or concerns.    We know you have a Choice in healthcare and appreciate you using [facility].  Our purpose is to provide you \"Excellent Care\".  We hope that you will always choose us in the future and continue to recommend us to your family and friends.             " Attempted to call the pt twice\\ sent my chart message with labs as well as reminder of appoitment.

## 2024-06-29 ENCOUNTER — PATIENT MESSAGE (OUTPATIENT)
Dept: ENDOCRINOLOGY | Age: 69
End: 2024-06-29

## 2024-06-29 DIAGNOSIS — K76.0 HEPATIC STEATOSIS: ICD-10-CM

## 2024-06-29 DIAGNOSIS — K76.0 FATTY LIVER: ICD-10-CM

## 2024-06-29 DIAGNOSIS — R93.2 ABNORMAL LIVER ULTRASOUND: ICD-10-CM

## 2024-06-29 DIAGNOSIS — E11.65 UNCONTROLLED TYPE 2 DIABETES MELLITUS WITH HYPERGLYCEMIA (HCC): ICD-10-CM

## 2024-06-29 LAB
25(OH)D3 SERPL-MCNC: 28.2 NG/ML
FERRITIN SERPL IA-MCNC: 64.7 NG/ML (ref 15–150)
GGT SERPL-CCNC: 13 U/L (ref 5–36)
HBV SURFACE AB SERPL IA-ACNC: <3.5 MIU/ML
HCV AB SERPL QL IA: NORMAL
IRON SATN MFR SERPL: 42 % (ref 15–50)
IRON SERPL-MCNC: 135 UG/DL (ref 37–145)
TIBC SERPL-MCNC: 320 UG/DL (ref 260–445)

## 2024-06-30 LAB
EST. AVERAGE GLUCOSE BLD GHB EST-MCNC: 211.6 MG/DL
HBA1C MFR BLD: 9 %

## 2024-07-01 NOTE — TELEPHONE ENCOUNTER
From: Pia El  To: Dr. Leonel Porras  Sent: 6/29/2024 6:10 PM EDT  Subject: Test Results of 6/29/24    [facility]! May I discuss my test results at your convenience?    I can be reached at [phone].      Thank you!

## 2024-07-03 RX ORDER — FAMOTIDINE 20 MG
1000 TABLET ORAL DAILY
Qty: 90 CAPSULE | Refills: 1 | Status: SHIPPED | OUTPATIENT
Start: 2024-07-03 | End: 2024-10-01

## 2024-07-25 ENCOUNTER — PATIENT MESSAGE (OUTPATIENT)
Dept: INTERNAL MEDICINE CLINIC | Age: 69
End: 2024-07-25

## 2024-07-25 NOTE — TELEPHONE ENCOUNTER
From: Pia El  To: Dr. Meena Castelan  Sent: 7/25/2024 10:53 AM EDT  Subject: ADA Form    Please see previous messages.    I am following up on my request for a ADA form to be submitted to my employer.  Once completed, can I be notified via [employer] chart?    Thank you so much!

## 2024-07-29 ENCOUNTER — HOSPITAL ENCOUNTER (OUTPATIENT)
Dept: GENERAL RADIOLOGY | Age: 69
Discharge: HOME | End: 2024-07-29
Payer: MEDICARE

## 2024-07-29 DIAGNOSIS — N95.9 POST MENOPAUSAL PROBLEMS: ICD-10-CM

## 2024-07-29 PROCEDURE — 77080 DXA BONE DENSITY AXIAL: CPT

## 2024-09-06 ENCOUNTER — PATIENT MESSAGE (OUTPATIENT)
Dept: ENDOCRINOLOGY | Age: 69
End: 2024-09-06

## 2024-09-06 NOTE — H&P
Gastroenterology Note                 Pre-operative History and Physical    Patient: Dany Villarreal  : 1955  CSN:     History Obtained From:   Patient or guardian. HISTORY OF PRESENT ILLNESS:    The patient is a 78 y.o. female here for Endoscopy. Past Medical History:    Past Medical History:   Diagnosis Date    Anxiety and depression     Asthma     Edema     GERD (gastroesophageal reflux disease)     Hyperlipidemia     Hypertension     IBS (irritable bowel syndrome)     Irregular menstrual bleeding 2011    Dr. Marilee Carrasquillo     Morbid obesity [facility])     Multinodular goiter 2010    Personal history of colonic polyps     Dr. Means Overall- due for repeat colonoscopy     Positive PPD, treated     INH completed 2001    S/P cholecystectomy 2011    Type II or unspecified type diabetes mellitus without mention of complication, not stated as uncontrolled      Past Surgical History:    Past Surgical History:   Procedure Laterality Date     SECTION N/A 30 plus years ago    x2    CHOLECYSTECTOMY      3/2009    COLONOSCOPY      2012 Dr. Anneliese Key 5 yrs    COLONOSCOPY      repeat x 3 yrs HAD 5 POLPYS    HYSTERECTOMY (CERVIX STATUS UNKNOWN)      HYSTEROSCOPY  2015    HYSTEROSCOPY, DILATATION AND CURETTAGE WITH NOVASURE ABLATION AND ABLATION    COMFORT STEREO BREAST BX ADD LESION RIGHT Right 2023    COMFORT STEREO BREAST BX ADD LESION RIGHT 2023 [facility]    COMFORT STEROTACTIC LOC BREAST BIOPSY RIGHT Right 2023    COMFORT STEROTACTIC LOC BREAST BIOPSY RIGHT 2023 [facility]    MYOMECTOMY      2003    TIBIA FRACTURE SURGERY      2006 w/ revision     Medications Prior to Admission:   No current facility-administered medications on file prior to encounter.      Current Outpatient Medications on File Prior to Encounter   Medication Sig Dispense Refill    diclofenac sodium (VOLTAREN) 1 % GEL Apply 2 g topically 4 times daily as needed for Pain Vaccine status unknown

## 2024-10-06 ENCOUNTER — HOSPITAL ENCOUNTER (OUTPATIENT)
Dept: ULTRASOUND IMAGING | Age: 69
Discharge: HOME | End: 2024-10-06
Payer: MEDICARE

## 2024-10-06 DIAGNOSIS — E04.2 MULTIPLE THYROID NODULES: ICD-10-CM

## 2024-10-06 PROCEDURE — 76536 US EXAM OF HEAD AND NECK: CPT

## 2024-10-08 DIAGNOSIS — I10 PRIMARY HYPERTENSION: ICD-10-CM

## 2024-10-08 RX ORDER — CLONIDINE HYDROCHLORIDE 0.1 MG/1
0.2 TABLET ORAL 2 TIMES DAILY
Qty: 360 TABLET | Refills: 1 | Status: SHIPPED | OUTPATIENT
Start: 2024-10-08 | End: 2025-01-06

## 2024-10-22 ENCOUNTER — PATIENT MESSAGE (OUTPATIENT)
Dept: INTERNAL MEDICINE CLINIC | Age: 69
End: 2024-10-22

## 2024-10-22 ENCOUNTER — OFFICE VISIT (OUTPATIENT)
Dept: ENDOCRINOLOGY | Age: 69
End: 2024-10-22
Payer: MEDICARE

## 2024-10-22 VITALS
BODY MASS INDEX: 48.4 KG/M2 | WEIGHT: 293 LBS | OXYGEN SATURATION: 98 % | DIASTOLIC BLOOD PRESSURE: 73 MMHG | SYSTOLIC BLOOD PRESSURE: 122 MMHG | RESPIRATION RATE: 15 BRPM | HEART RATE: 78 BPM

## 2024-10-22 DIAGNOSIS — E04.2 MULTIPLE THYROID NODULES: ICD-10-CM

## 2024-10-22 DIAGNOSIS — E11.65 UNCONTROLLED TYPE 2 DIABETES MELLITUS WITH HYPERGLYCEMIA (HCC): Primary | ICD-10-CM

## 2024-10-22 PROBLEM — Z79.4 TYPE 2 DIABETES MELLITUS WITH OTHER CIRCULATORY COMPLICATION, WITH LONG-TERM CURRENT USE OF INSULIN (HCC): Status: ACTIVE | Noted: 2024-10-22

## 2024-10-22 PROBLEM — E11.59 TYPE 2 DIABETES MELLITUS WITH OTHER CIRCULATORY COMPLICATION, WITH LONG-TERM CURRENT USE OF INSULIN (HCC): Status: ACTIVE | Noted: 2024-10-22

## 2024-10-22 LAB — HBA1C MFR BLD: 10.4 %

## 2024-10-22 PROCEDURE — 3052F HG A1C>EQUAL 8.0%<EQUAL 9.0%: CPT | Performed by: INTERNAL MEDICINE

## 2024-10-22 PROCEDURE — 95251 CONT GLUC MNTR ANALYSIS I&R: CPT | Performed by: INTERNAL MEDICINE

## 2024-10-22 PROCEDURE — 1123F ACP DISCUSS/DSCN MKR DOCD: CPT | Performed by: INTERNAL MEDICINE

## 2024-10-22 PROCEDURE — 83036 HEMOGLOBIN GLYCOSYLATED A1C: CPT | Performed by: INTERNAL MEDICINE

## 2024-10-22 PROCEDURE — 99214 OFFICE O/P EST MOD 30 MIN: CPT | Performed by: INTERNAL MEDICINE

## 2024-10-22 PROCEDURE — G2211 COMPLEX E/M VISIT ADD ON: HCPCS | Performed by: INTERNAL MEDICINE

## 2024-10-22 PROCEDURE — 3074F SYST BP LT 130 MM HG: CPT | Performed by: INTERNAL MEDICINE

## 2024-10-22 PROCEDURE — 3078F DIAST BP <80 MM HG: CPT | Performed by: INTERNAL MEDICINE

## 2024-10-22 RX ORDER — INSULIN LISPRO 100 [IU]/ML
INJECTION, SOLUTION INTRAVENOUS; SUBCUTANEOUS
Qty: 15 ADJUSTABLE DOSE PRE-FILLED PEN SYRINGE | Refills: 3 | Status: SHIPPED | OUTPATIENT
Start: 2024-10-22

## 2024-10-22 RX ORDER — INSULIN GLARGINE 100 [IU]/ML
INJECTION, SOLUTION SUBCUTANEOUS
Qty: 20 ADJUSTABLE DOSE PRE-FILLED PEN SYRINGE | Refills: 3 | Status: SHIPPED | OUTPATIENT
Start: 2024-10-22

## 2024-10-22 NOTE — PROGRESS NOTES
tablet by mouth every morning 90 tablet 3    chlorthalidone (HYGROTON) 25 MG tablet Take 1 tablet by mouth daily 30 tablet 3    busPIRone (BUSPAR) 15 MG tablet Take 15 mg by mouth 3 times daily as needed (anxiety) 90 tablet 3    atorvastatin (LIPITOR) 40 MG tablet TAKE 1 TABLET DAILY 90 tablet 3    FLUoxetine (PROZAC) 40 MG capsule TAKE 1 CAPSULE DAILY 90 capsule 3    Dulaglutide (TRULICITY) 0.75 MG/0.5ML SOPN 0.75 mg weekly 12 pen 3    insulin regular human (HUMULIN R U-500 KWIKPEN) 500 UNIT/ML SOPN concentrated injection pen  units before breakfast and dinner 18 pen 1    betamethasone dipropionate (DIPROLENE) 0.05 % ointment APPLY TOPICALLY DAILY 90 g 3    verapamil (CALAN SR) 240 MG extended release tablet TAKE 1 TABLET NIGHTLY 90 tablet 3    metoprolol succinate (TOPROL XL) 50 MG extended release tablet Take 1 tablet by mouth nightly 30 tablet 3    losartan (COZAAR) 100 MG tablet TAKE 1 TABLET DAILY 90 tablet 4    montelukast (SINGULAIR) 10 MG tablet Take 1 tablet by mouth nightly 90 tablet 3    Insulin Pen Needle 32G X 4 MM MISC 1 each by Does not apply route daily 100 each 3    loratadine (CLARITIN) 10 MG tablet Take 10 mg by mouth daily      ONE TOUCH LANCETS MISC 1 each by Does not apply route daily 100 each 3     No current facility-administered medications for this visit.        Review of Systems    Scanned, reviewed    Vitals:    10/22/24 1510   BP: 122/73   Pulse: 78   Resp: 15   SpO2: 98%         Constitutional: Well-developed, appears stated age, cooperative, in no acute distress  H/E/N/M/T:atraumatic, normocephalic, external ears, nose, lips normal without lesions  Eyes: Lids, lashes, conjunctivae and sclerae normal, No proptosis, no redness  Neck: supple, symmetrical, no swelling  Skin: No obvious rashes or lesions present.  Skin and hair texture normal  Psychiatric: Judgement and Insight:  judgement and insight appear normal  Neuro: Normal without focal findings, speech is normal normal,

## 2024-10-25 ENCOUNTER — PATIENT MESSAGE (OUTPATIENT)
Dept: ENDOCRINOLOGY | Age: 69
End: 2024-10-25

## 2024-11-08 ENCOUNTER — TELEPHONE (OUTPATIENT)
Dept: INTERNAL MEDICINE CLINIC | Age: 69
End: 2024-11-08

## 2024-11-08 NOTE — TELEPHONE ENCOUNTER
Jeannette Arellano a RN from [facility] is requesting to speak with someone in regards to the pts AWV visit in April of this year. She is needing to discuss the pts BP, EGFR, urine creatinine ratio and A1C.      [phone] Jeannette call back

## 2024-11-11 ENCOUNTER — ENROLLMENT (OUTPATIENT)
Dept: PHARMACY | Facility: CLINIC | Age: 69
End: 2024-11-11

## 2024-11-11 NOTE — TELEPHONE ENCOUNTER
Called  dane boucher: awaiting call back    Spoke with [facility] (representative) they needed update on Pia lab (creatinine) last done 1/16/23

## 2024-12-06 RX ORDER — ATORVASTATIN CALCIUM 40 MG/1
TABLET, FILM COATED ORAL
Qty: 90 TABLET | Refills: 1 | Status: SHIPPED | OUTPATIENT
Start: 2024-12-06

## 2024-12-23 ENCOUNTER — OFFICE VISIT (OUTPATIENT)
Dept: INTERNAL MEDICINE CLINIC | Age: 69
End: 2024-12-23
Payer: MEDICARE

## 2024-12-23 VITALS
WEIGHT: 293 LBS | HEIGHT: 67 IN | HEART RATE: 62 BPM | DIASTOLIC BLOOD PRESSURE: 82 MMHG | TEMPERATURE: 97 F | OXYGEN SATURATION: 97 % | SYSTOLIC BLOOD PRESSURE: 124 MMHG | BODY MASS INDEX: 45.99 KG/M2

## 2024-12-23 DIAGNOSIS — Z79.4 TYPE 2 DIABETES MELLITUS WITH OTHER CIRCULATORY COMPLICATION, WITH LONG-TERM CURRENT USE OF INSULIN (HCC): ICD-10-CM

## 2024-12-23 DIAGNOSIS — M54.50 CHRONIC BILATERAL LOW BACK PAIN WITHOUT SCIATICA: Primary | ICD-10-CM

## 2024-12-23 DIAGNOSIS — E55.9 VITAMIN D DEFICIENCY: ICD-10-CM

## 2024-12-23 DIAGNOSIS — M25.552 BILATERAL HIP PAIN: ICD-10-CM

## 2024-12-23 DIAGNOSIS — R35.0 URINE FREQUENCY: ICD-10-CM

## 2024-12-23 DIAGNOSIS — E11.59 TYPE 2 DIABETES MELLITUS WITH OTHER CIRCULATORY COMPLICATION, WITH LONG-TERM CURRENT USE OF INSULIN (HCC): ICD-10-CM

## 2024-12-23 DIAGNOSIS — I10 PRIMARY HYPERTENSION: ICD-10-CM

## 2024-12-23 DIAGNOSIS — M25.551 BILATERAL HIP PAIN: ICD-10-CM

## 2024-12-23 DIAGNOSIS — G89.29 CHRONIC BILATERAL LOW BACK PAIN WITHOUT SCIATICA: Primary | ICD-10-CM

## 2024-12-23 LAB
BILIRUBIN, POC: 0
BLOOD URINE, POC: 0
CLARITY, POC: CLEAR
COLOR, POC: YELLOW
GLUCOSE URINE, POC: 0 MG/DL
KETONES, POC: 0 MG/DL
LEUKOCYTE EST, POC: 0
NITRITE, POC: NORMAL
PH, POC: 7
PROTEIN, POC: 0 MG/DL
SPECIFIC GRAVITY, POC: 1.01
UROBILINOGEN, POC: 0 MG/DL

## 2024-12-23 PROCEDURE — 3017F COLORECTAL CA SCREEN DOC REV: CPT | Performed by: STUDENT IN AN ORGANIZED HEALTH CARE EDUCATION/TRAINING PROGRAM

## 2024-12-23 PROCEDURE — 1123F ACP DISCUSS/DSCN MKR DOCD: CPT | Performed by: STUDENT IN AN ORGANIZED HEALTH CARE EDUCATION/TRAINING PROGRAM

## 2024-12-23 PROCEDURE — 99214 OFFICE O/P EST MOD 30 MIN: CPT | Performed by: STUDENT IN AN ORGANIZED HEALTH CARE EDUCATION/TRAINING PROGRAM

## 2024-12-23 PROCEDURE — 3046F HEMOGLOBIN A1C LEVEL >9.0%: CPT | Performed by: STUDENT IN AN ORGANIZED HEALTH CARE EDUCATION/TRAINING PROGRAM

## 2024-12-23 PROCEDURE — G2211 COMPLEX E/M VISIT ADD ON: HCPCS | Performed by: STUDENT IN AN ORGANIZED HEALTH CARE EDUCATION/TRAINING PROGRAM

## 2024-12-23 PROCEDURE — 3079F DIAST BP 80-89 MM HG: CPT | Performed by: STUDENT IN AN ORGANIZED HEALTH CARE EDUCATION/TRAINING PROGRAM

## 2024-12-23 PROCEDURE — G8417 CALC BMI ABV UP PARAM F/U: HCPCS | Performed by: STUDENT IN AN ORGANIZED HEALTH CARE EDUCATION/TRAINING PROGRAM

## 2024-12-23 PROCEDURE — 1160F RVW MEDS BY RX/DR IN RCRD: CPT | Performed by: STUDENT IN AN ORGANIZED HEALTH CARE EDUCATION/TRAINING PROGRAM

## 2024-12-23 PROCEDURE — G8427 DOCREV CUR MEDS BY ELIG CLIN: HCPCS | Performed by: STUDENT IN AN ORGANIZED HEALTH CARE EDUCATION/TRAINING PROGRAM

## 2024-12-23 PROCEDURE — G8399 PT W/DXA RESULTS DOCUMENT: HCPCS | Performed by: STUDENT IN AN ORGANIZED HEALTH CARE EDUCATION/TRAINING PROGRAM

## 2024-12-23 PROCEDURE — 1159F MED LIST DOCD IN RCRD: CPT | Performed by: STUDENT IN AN ORGANIZED HEALTH CARE EDUCATION/TRAINING PROGRAM

## 2024-12-23 PROCEDURE — 3074F SYST BP LT 130 MM HG: CPT | Performed by: STUDENT IN AN ORGANIZED HEALTH CARE EDUCATION/TRAINING PROGRAM

## 2024-12-23 PROCEDURE — 81002 URINALYSIS NONAUTO W/O SCOPE: CPT | Performed by: STUDENT IN AN ORGANIZED HEALTH CARE EDUCATION/TRAINING PROGRAM

## 2024-12-23 PROCEDURE — 1090F PRES/ABSN URINE INCON ASSESS: CPT | Performed by: STUDENT IN AN ORGANIZED HEALTH CARE EDUCATION/TRAINING PROGRAM

## 2024-12-23 PROCEDURE — G8484 FLU IMMUNIZE NO ADMIN: HCPCS | Performed by: STUDENT IN AN ORGANIZED HEALTH CARE EDUCATION/TRAINING PROGRAM

## 2024-12-23 PROCEDURE — 2022F DILAT RTA XM EVC RTNOPTHY: CPT | Performed by: STUDENT IN AN ORGANIZED HEALTH CARE EDUCATION/TRAINING PROGRAM

## 2024-12-23 PROCEDURE — 1036F TOBACCO NON-USER: CPT | Performed by: STUDENT IN AN ORGANIZED HEALTH CARE EDUCATION/TRAINING PROGRAM

## 2024-12-23 RX ORDER — GABAPENTIN 300 MG/1
600 CAPSULE ORAL 2 TIMES DAILY
Qty: 120 CAPSULE | Refills: 0 | Status: SHIPPED | OUTPATIENT
Start: 2024-12-23 | End: 2025-01-22

## 2024-12-23 NOTE — ASSESSMENT & PLAN NOTE
Uncontrolled managed by Endo.  On long-term insulin therapy.  Continue with current medication.  Continue CGM, denies any hypoglycemic episodes  Eye exam up-to-date, again in 3/2025  Foot exam is due  On statin

## 2024-12-23 NOTE — PROGRESS NOTES
Pia El (:  1955) is a 69 y.o. female here for evaluation of the following chief complaint(s):  Arthritis (Request pred. Inj advised  to go to orth DR GARCIA, bilat hip pain,( recurring incontinent )/)      Assessment & Plan   ASSESSMENT/PLAN:  1. Chronic bilateral low back pain without sciatica  -     gabapentin (NEURONTIN) 300 MG capsule; Take 2 capsules by mouth in the morning and at bedtime for 30 days. Intended supply: 90 days, Disp-120 capsule, R-0Normal  -     Hemoglobin A1C; Future  -     Lipid, Fasting; Future  -     TSH reflex to FT4; Future  -     Comprehensive Metabolic Panel; Future  -     CBC with Auto Differential; Future  -     Vitamin D 25 Hydroxy; Future  2. Bilateral hip pain  -     gabapentin (NEURONTIN) 300 MG capsule; Take 2 capsules by mouth in the morning and at bedtime for 30 days. Intended supply: 90 days, Disp-120 capsule, R-0Normal  -     Hemoglobin A1C; Future  -     Lipid, Fasting; Future  -     TSH reflex to FT4; Future  -     Comprehensive Metabolic Panel; Future  -     CBC with Auto Differential; Future  -     Vitamin D 25 Hydroxy; Future  3. Type 2 diabetes mellitus with other circulatory complication, with long-term current use of insulin (HCC)  Assessment & Plan:  Uncontrolled managed by Endo.  On long-term insulin therapy.  Continue with current medication.  Continue CGM, denies any hypoglycemic episodes  Eye exam up-to-date, again in 3/2025  Foot exam is due  On statin    Orders:  -     Hemoglobin A1C; Future  -     Lipid, Fasting; Future  -     TSH reflex to FT4; Future  -     Comprehensive Metabolic Panel; Future  -     CBC with Auto Differential; Future  -     Vitamin D 25 Hydroxy; Future  -     Albumin/Creatinine Ratio, Urine; Future  4. Primary hypertension  -     Hemoglobin A1C; Future  -     Lipid, Fasting; Future  -     TSH reflex to FT4; Future  -     Comprehensive Metabolic Panel; Future  -     CBC with Auto Differential; Future  -     Vitamin D 25

## 2024-12-27 ENCOUNTER — HOSPITAL ENCOUNTER (OUTPATIENT)
Dept: ULTRASOUND IMAGING | Age: 69
Discharge: HOME | End: 2024-12-27
Attending: INTERNAL MEDICINE

## 2024-12-27 DIAGNOSIS — E11.65 UNCONTROLLED TYPE 2 DIABETES MELLITUS WITH HYPERGLYCEMIA (HCC): ICD-10-CM

## 2024-12-27 DIAGNOSIS — E04.2 MULTIPLE THYROID NODULES: ICD-10-CM

## 2025-01-14 DIAGNOSIS — M54.50 CHRONIC BILATERAL LOW BACK PAIN WITHOUT SCIATICA: ICD-10-CM

## 2025-01-14 DIAGNOSIS — G89.29 CHRONIC BILATERAL LOW BACK PAIN WITHOUT SCIATICA: ICD-10-CM

## 2025-01-14 DIAGNOSIS — M25.551 BILATERAL HIP PAIN: ICD-10-CM

## 2025-01-14 DIAGNOSIS — M25.552 BILATERAL HIP PAIN: ICD-10-CM

## 2025-01-15 RX ORDER — GABAPENTIN 300 MG/1
600 CAPSULE ORAL 2 TIMES DAILY
Qty: 120 CAPSULE | Refills: 0 | Status: SHIPPED | OUTPATIENT
Start: 2025-01-15 | End: 2025-02-14

## 2025-01-15 NOTE — TELEPHONE ENCOUNTER
Last appointment: 12/23/2024  Next appointment: 4/25/2025  Patient is requesting 1 year supply please review     Last refill: 12/23/24

## 2025-01-28 ENCOUNTER — OFFICE VISIT (OUTPATIENT)
Dept: ENDOCRINOLOGY | Age: 70
End: 2025-01-28
Payer: MEDICARE

## 2025-01-28 VITALS
BODY MASS INDEX: 50.28 KG/M2 | SYSTOLIC BLOOD PRESSURE: 146 MMHG | DIASTOLIC BLOOD PRESSURE: 65 MMHG | OXYGEN SATURATION: 99 % | HEART RATE: 81 BPM | WEIGHT: 293 LBS | RESPIRATION RATE: 16 BRPM

## 2025-01-28 DIAGNOSIS — E04.2 MULTIPLE THYROID NODULES: ICD-10-CM

## 2025-01-28 DIAGNOSIS — E11.65 UNCONTROLLED TYPE 2 DIABETES MELLITUS WITH HYPERGLYCEMIA (HCC): Primary | ICD-10-CM

## 2025-01-28 LAB — HBA1C MFR BLD: 7.2 %

## 2025-01-28 PROCEDURE — 3077F SYST BP >= 140 MM HG: CPT | Performed by: INTERNAL MEDICINE

## 2025-01-28 PROCEDURE — 1159F MED LIST DOCD IN RCRD: CPT | Performed by: INTERNAL MEDICINE

## 2025-01-28 PROCEDURE — G8427 DOCREV CUR MEDS BY ELIG CLIN: HCPCS | Performed by: INTERNAL MEDICINE

## 2025-01-28 PROCEDURE — G8399 PT W/DXA RESULTS DOCUMENT: HCPCS | Performed by: INTERNAL MEDICINE

## 2025-01-28 PROCEDURE — G8417 CALC BMI ABV UP PARAM F/U: HCPCS | Performed by: INTERNAL MEDICINE

## 2025-01-28 PROCEDURE — 1123F ACP DISCUSS/DSCN MKR DOCD: CPT | Performed by: INTERNAL MEDICINE

## 2025-01-28 PROCEDURE — 99214 OFFICE O/P EST MOD 30 MIN: CPT | Performed by: INTERNAL MEDICINE

## 2025-01-28 PROCEDURE — 3046F HEMOGLOBIN A1C LEVEL >9.0%: CPT | Performed by: INTERNAL MEDICINE

## 2025-01-28 PROCEDURE — 1036F TOBACCO NON-USER: CPT | Performed by: INTERNAL MEDICINE

## 2025-01-28 PROCEDURE — 3017F COLORECTAL CA SCREEN DOC REV: CPT | Performed by: INTERNAL MEDICINE

## 2025-01-28 PROCEDURE — 1090F PRES/ABSN URINE INCON ASSESS: CPT | Performed by: INTERNAL MEDICINE

## 2025-01-28 PROCEDURE — 83036 HEMOGLOBIN GLYCOSYLATED A1C: CPT | Performed by: INTERNAL MEDICINE

## 2025-01-28 PROCEDURE — G2211 COMPLEX E/M VISIT ADD ON: HCPCS | Performed by: INTERNAL MEDICINE

## 2025-01-28 PROCEDURE — 95251 CONT GLUC MNTR ANALYSIS I&R: CPT | Performed by: INTERNAL MEDICINE

## 2025-01-28 PROCEDURE — 2022F DILAT RTA XM EVC RTNOPTHY: CPT | Performed by: INTERNAL MEDICINE

## 2025-01-28 PROCEDURE — 3078F DIAST BP <80 MM HG: CPT | Performed by: INTERNAL MEDICINE

## 2025-01-28 RX ORDER — ALPRAZOLAM 0.25 MG/1
TABLET ORAL
Qty: 2 TABLET | Refills: 0 | Status: SHIPPED | OUTPATIENT
Start: 2025-01-28 | End: 2026-01-28

## 2025-01-28 NOTE — PROGRESS NOTES
Seen as f/u patient for diabetes      Interm:    Glucose better  Did not have FNA  Had panic attack    Wanted  to go off humulin U 500  She has concern about lows    May take insulin after meals  Hold insulin if glucose in 80s      Diagnosed with Type 2 diabetes mellitus >10 years ago  Known diabetic complications: Retinopathy NPDR    Current diabetic medications     Lantus 60 units   Humalog  20/20/20   SSI 2 for 50 >150      Previous:  U-500 40/40/35  units before meals   SSI 5 for 50>150  -20 < 80  Ozempic 2mg  Jardiance 25mg  off of it due to urinary f and yeast    Previous:    U-500 70/80/70  But taking only with lunch n dinner   SSI 5 for 50>150  Trulicity    Moderate, uncontrolled    Previous:    Lantus 70/70    But missing second shot 3 days a week   But doing 66/66  Humalog 38 units AC TID, taking it 2/week    But doing 34   SSI 2 for 50>150  She has relative hypoglycemia in the 70s    H/o use of lantus, novolog, victoza, januvia    Metformin and ER caused GI symptoms    Last A1c  7.2%<----10.4%<-----9.8%<---- 8.8%<----9% <-----11%<-----12.3%<---- 9.5%<----- 9.2%<----10.5%<-----9.3%<------9.9%<-----9.2%<------10.2 on 5/17<--- 8.4 on 12/16<-----7.8 on 6/16<------ 9.6 on 2/16<---- 10.5 on 10/15<-----11.8 on 8/15<---14.2 <--- 10.6 <--- 9.4    Prior visit with dietician: Yes   Current diet: on average, 3 meals per day does not follow low CHO  Current exercise: walking   Current monitoring regimen: home blood tests   1-2 times per day  Has brought blood glucose log/meter:   Home blood sugar records:     CGM  Last 2 weeks  Average 153  Target 69%  30% high  1 % low      Any episodes of hypoglycemia? 44    No Hx of CAD , PVD, CVA    Hyperlipidemia:controlled on statin LDL 51 on 9/17  Simvastatin 40mg  LDL 58 on 3/20  LDL 49 on 11/21  Last eye exam: 5/24  Last foot exam: 2/24  Last microalbumin to creatinine ratio:1/23    HTN: On multiple medications, taking toprol 50mg?  Losartan 100mg verapamil 240mg

## 2025-01-31 ENCOUNTER — OFFICE VISIT (OUTPATIENT)
Dept: ORTHOPEDIC SURGERY | Age: 70
End: 2025-01-31

## 2025-01-31 VITALS — HEIGHT: 67 IN | WEIGHT: 293 LBS | BODY MASS INDEX: 45.99 KG/M2

## 2025-01-31 DIAGNOSIS — M16.0 PRIMARY OSTEOARTHRITIS OF BOTH HIPS: Primary | ICD-10-CM

## 2025-01-31 RX ORDER — LIDOCAINE HYDROCHLORIDE 10 MG/ML
1 INJECTION, SOLUTION INFILTRATION; PERINEURAL ONCE
Status: COMPLETED | OUTPATIENT
Start: 2025-01-31 | End: 2025-01-31

## 2025-01-31 RX ORDER — BUPIVACAINE HYDROCHLORIDE 2.5 MG/ML
1 INJECTION, SOLUTION INFILTRATION; PERINEURAL ONCE
Status: COMPLETED | OUTPATIENT
Start: 2025-01-31 | End: 2025-01-31

## 2025-01-31 RX ORDER — BETAMETHASONE SODIUM PHOSPHATE AND BETAMETHASONE ACETATE 3; 3 MG/ML; MG/ML
12 INJECTION, SUSPENSION INTRA-ARTICULAR; INTRALESIONAL; INTRAMUSCULAR; SOFT TISSUE ONCE
Status: COMPLETED | OUTPATIENT
Start: 2025-01-31 | End: 2025-01-31

## 2025-01-31 RX ADMIN — LIDOCAINE HYDROCHLORIDE 1 ML: 10 INJECTION, SOLUTION INFILTRATION; PERINEURAL at 15:27

## 2025-01-31 RX ADMIN — BETAMETHASONE SODIUM PHOSPHATE AND BETAMETHASONE ACETATE 12 MG: 3; 3 INJECTION, SUSPENSION INTRA-ARTICULAR; INTRALESIONAL; INTRAMUSCULAR; SOFT TISSUE at 15:27

## 2025-01-31 RX ADMIN — BUPIVACAINE HYDROCHLORIDE 2.5 MG: 2.5 INJECTION, SOLUTION INFILTRATION; PERINEURAL at 15:27

## 2025-01-31 NOTE — PROGRESS NOTES
Dr Trev Talbot      Date /Time 1/31/2025       9:20 AM EDT  Name Pia El             1955   Location  [facility]  MRN [id number]                Chief Complaint   Patient presents with    Follow-up     Ck Bilateral Hips         History of Present Illness    Pia El is a 69 y.o. female who presents with  bilateral hip pain.    Sent in consultation by Meena Castelan MD, .      Injury Mechanism:  none.  Worker's Comp. & legal issues:   none.  Previous Treatments: Ice, Heat, and NSAIDs    He is in follow-up today regarding bilateral hips and her back.  She has significant pain worse in the left than the right.  Pain is consistent from previous examinations.  Unfortunately, she has not lost any weight and says she might of gained some.    Previous history:  Patient presents to the office today for a follow-up visit.  Patient being treated for bilateral hip impingement and osteoarthritis.  She continues to be symptomatic.  She has not yet attended physical therapy.  Symptoms grossly unchanged.    Previous history: Patient presents to the office today for a new problem.  Patient here with a chief complaint of bilateral hip pain.  Patient's pain is mostly concentrated in her groin.  She has increased pain with activities and improvement with rest.  She has had no injury or trauma.  She has tried ibuprofen based on the recommendations of her primary care provider with only mild relief.  She has also been in physical therapy.    Past History  Past Medical History:   Diagnosis Date    Anxiety and depression     Asthma     Edema     GERD (gastroesophageal reflux disease)     Hyperlipidemia     Hypertension     IBS (irritable bowel syndrome)     Irregular menstrual bleeding 4/16/2011     Gravely     Morbid obesity     Multinodular goiter 5/22/2010    Personal history of colonic polyps     Dr. Allison- due for repeat colonoscopy 2012    Positive PPD, treated     INH completed 11/2001    S/P

## 2025-02-04 ENCOUNTER — PATIENT MESSAGE (OUTPATIENT)
Dept: INTERNAL MEDICINE CLINIC | Age: 70
End: 2025-02-04

## 2025-02-05 RX ORDER — FLUOXETINE 40 MG/1
CAPSULE ORAL
Qty: 90 CAPSULE | Refills: 1 | Status: SHIPPED | OUTPATIENT
Start: 2025-02-05

## 2025-02-06 ENCOUNTER — OFFICE VISIT (OUTPATIENT)
Dept: INTERNAL MEDICINE CLINIC | Age: 70
End: 2025-02-06
Payer: MEDICARE

## 2025-02-06 VITALS
DIASTOLIC BLOOD PRESSURE: 78 MMHG | HEART RATE: 77 BPM | OXYGEN SATURATION: 99 % | SYSTOLIC BLOOD PRESSURE: 136 MMHG | WEIGHT: 293 LBS | BODY MASS INDEX: 50.75 KG/M2

## 2025-02-06 DIAGNOSIS — E11.59 TYPE 2 DIABETES MELLITUS WITH OTHER CIRCULATORY COMPLICATION, WITH LONG-TERM CURRENT USE OF INSULIN (HCC): ICD-10-CM

## 2025-02-06 DIAGNOSIS — R60.0 BILATERAL LEG EDEMA: Primary | ICD-10-CM

## 2025-02-06 DIAGNOSIS — Z79.4 TYPE 2 DIABETES MELLITUS WITH OTHER CIRCULATORY COMPLICATION, WITH LONG-TERM CURRENT USE OF INSULIN (HCC): ICD-10-CM

## 2025-02-06 PROCEDURE — 1123F ACP DISCUSS/DSCN MKR DOCD: CPT | Performed by: STUDENT IN AN ORGANIZED HEALTH CARE EDUCATION/TRAINING PROGRAM

## 2025-02-06 PROCEDURE — 1159F MED LIST DOCD IN RCRD: CPT | Performed by: STUDENT IN AN ORGANIZED HEALTH CARE EDUCATION/TRAINING PROGRAM

## 2025-02-06 PROCEDURE — G8427 DOCREV CUR MEDS BY ELIG CLIN: HCPCS | Performed by: STUDENT IN AN ORGANIZED HEALTH CARE EDUCATION/TRAINING PROGRAM

## 2025-02-06 PROCEDURE — G8399 PT W/DXA RESULTS DOCUMENT: HCPCS | Performed by: STUDENT IN AN ORGANIZED HEALTH CARE EDUCATION/TRAINING PROGRAM

## 2025-02-06 PROCEDURE — 3051F HG A1C>EQUAL 7.0%<8.0%: CPT | Performed by: STUDENT IN AN ORGANIZED HEALTH CARE EDUCATION/TRAINING PROGRAM

## 2025-02-06 PROCEDURE — G2211 COMPLEX E/M VISIT ADD ON: HCPCS | Performed by: STUDENT IN AN ORGANIZED HEALTH CARE EDUCATION/TRAINING PROGRAM

## 2025-02-06 PROCEDURE — 3017F COLORECTAL CA SCREEN DOC REV: CPT | Performed by: STUDENT IN AN ORGANIZED HEALTH CARE EDUCATION/TRAINING PROGRAM

## 2025-02-06 PROCEDURE — 1036F TOBACCO NON-USER: CPT | Performed by: STUDENT IN AN ORGANIZED HEALTH CARE EDUCATION/TRAINING PROGRAM

## 2025-02-06 PROCEDURE — 3075F SYST BP GE 130 - 139MM HG: CPT | Performed by: STUDENT IN AN ORGANIZED HEALTH CARE EDUCATION/TRAINING PROGRAM

## 2025-02-06 PROCEDURE — 2022F DILAT RTA XM EVC RTNOPTHY: CPT | Performed by: STUDENT IN AN ORGANIZED HEALTH CARE EDUCATION/TRAINING PROGRAM

## 2025-02-06 PROCEDURE — 99214 OFFICE O/P EST MOD 30 MIN: CPT | Performed by: STUDENT IN AN ORGANIZED HEALTH CARE EDUCATION/TRAINING PROGRAM

## 2025-02-06 PROCEDURE — 3078F DIAST BP <80 MM HG: CPT | Performed by: STUDENT IN AN ORGANIZED HEALTH CARE EDUCATION/TRAINING PROGRAM

## 2025-02-06 PROCEDURE — 1090F PRES/ABSN URINE INCON ASSESS: CPT | Performed by: STUDENT IN AN ORGANIZED HEALTH CARE EDUCATION/TRAINING PROGRAM

## 2025-02-06 PROCEDURE — G8417 CALC BMI ABV UP PARAM F/U: HCPCS | Performed by: STUDENT IN AN ORGANIZED HEALTH CARE EDUCATION/TRAINING PROGRAM

## 2025-02-06 RX ORDER — FUROSEMIDE 20 MG/1
20 TABLET ORAL 2 TIMES DAILY
Qty: 180 TABLET | Refills: 1 | Status: SHIPPED | OUTPATIENT
Start: 2025-02-06

## 2025-02-06 SDOH — ECONOMIC STABILITY: FOOD INSECURITY: WITHIN THE PAST 12 MONTHS, YOU WORRIED THAT YOUR FOOD WOULD RUN OUT BEFORE YOU GOT MONEY TO BUY MORE.: NEVER TRUE

## 2025-02-06 SDOH — ECONOMIC STABILITY: FOOD INSECURITY: WITHIN THE PAST 12 MONTHS, THE FOOD YOU BOUGHT JUST DIDN'T LAST AND YOU DIDN'T HAVE MONEY TO GET MORE.: NEVER TRUE

## 2025-02-06 ASSESSMENT — PATIENT HEALTH QUESTIONNAIRE - PHQ9
5. POOR APPETITE OR OVEREATING: NOT AT ALL
SUM OF ALL RESPONSES TO PHQ9 QUESTIONS 1 & 2: 0
8. MOVING OR SPEAKING SO SLOWLY THAT OTHER PEOPLE COULD HAVE NOTICED. OR THE OPPOSITE, BEING SO FIGETY OR RESTLESS THAT YOU HAVE BEEN MOVING AROUND A LOT MORE THAN USUAL: NOT AT ALL
SUM OF ALL RESPONSES TO PHQ QUESTIONS 1-9: 1
SUM OF ALL RESPONSES TO PHQ QUESTIONS 1-9: 1
9. THOUGHTS THAT YOU WOULD BE BETTER OFF DEAD, OR OF HURTING YOURSELF: NOT AT ALL
6. FEELING BAD ABOUT YOURSELF - OR THAT YOU ARE A FAILURE OR HAVE LET YOURSELF OR YOUR FAMILY DOWN: NOT AT ALL
4. FEELING TIRED OR HAVING LITTLE ENERGY: NOT AT ALL
2. FEELING DOWN, DEPRESSED OR HOPELESS: NOT AT ALL
SUM OF ALL RESPONSES TO PHQ QUESTIONS 1-9: 1
7. TROUBLE CONCENTRATING ON THINGS, SUCH AS READING THE NEWSPAPER OR WATCHING TELEVISION: NOT AT ALL
3. TROUBLE FALLING OR STAYING ASLEEP: SEVERAL DAYS
SUM OF ALL RESPONSES TO PHQ QUESTIONS 1-9: 1
10. IF YOU CHECKED OFF ANY PROBLEMS, HOW DIFFICULT HAVE THESE PROBLEMS MADE IT FOR YOU TO DO YOUR WORK, TAKE CARE OF THINGS AT HOME, OR GET ALONG WITH OTHER PEOPLE: SOMEWHAT DIFFICULT
1. LITTLE INTEREST OR PLEASURE IN DOING THINGS: NOT AT ALL

## 2025-02-06 NOTE — ASSESSMENT & PLAN NOTE
Has been much improved.  A1c now 7.2% continue to follow-up with endocrinology  On chronic insulin therapy  Continue CGM, denies any hypoglycemic episodes  Eye exam up-to-date, again in 3/2025  Foot exam is due  On statin

## 2025-02-06 NOTE — PROGRESS NOTES
Pia El (:  1955) is a 69 y.o. female here for evaluation of the following chief complaint(s):  Leg Swelling (Both legs have been swelling really bad for 2 weeks)      Assessment & Plan   ASSESSMENT/PLAN:  1. Bilateral leg edema  Assessment & Plan:  Jose Francisco 2/2 venous insufficiency   Lasix 20 mg bid. Monitor sx at home might need to increase dose   Start compression stocking  Elevate legs  Orders:  -     furosemide (LASIX) 20 MG tablet; Take 1 tablet by mouth 2 times daily, Disp-180 tablet, R-1Normal  2. Type 2 diabetes mellitus with other circulatory complication, with long-term current use of insulin ([facility])  Assessment & Plan:  Has been much improved.  A1c now 7.2% continue to follow-up with endocrinology  On chronic insulin therapy  Continue CGM, denies any hypoglycemic episodes  Eye exam up-to-date, again in 3/2025  Foot exam is due  On statin      Return in about 3 months (around 2025) for Annual Wellness Visit.         Subjective   SUBJECTIVE/OBJECTIVE:  SHERLY Palacio presented adequate bilateral lower extremity edema.  She reports her symptoms started about 2 weeks ago continues to be progressing.  Denies any new medication or supplement no chest pain shortness of breath orthopnea symptom.    Review of Systems:   Constitutional:  Denies fever or chills   Eyes:  Denies change in visual acuity   HENT:  Denies nasal congestion or sore throat   Respiratory:  Denies cough or shortness of breath   Cardiovascular:  Denies chest pain or edema   GI:  Denies abdominal pain, nausea, vomiting, bloody stools or diarrhea   :  Denies dysuria   Musculoskeletal:  Denies back pain or joint pain   Integument:  Denies rash   Neurologic:  Denies headache, focal weakness or sensory changes   Endocrine:  Denies polyuria or polydipsia   Lymphatic:  Denies swollen glands   Psychiatric:  Denies depression or anxiety        Current Outpatient Medications   Medication Sig Dispense Refill    furosemide (LASIX) 20 MG tablet

## 2025-02-06 NOTE — ASSESSMENT & PLAN NOTE
Jose Francisco 2/2 venous insufficiency   Lasix 20 mg bid. Monitor sx at home might need to increase dose   Start compression stocking  Elevate legs

## 2025-02-07 RX ORDER — CHLORTHALIDONE 50 MG/1
50 TABLET ORAL DAILY
Qty: 90 TABLET | Refills: 1 | Status: SHIPPED | OUTPATIENT
Start: 2025-02-07

## 2025-04-17 DIAGNOSIS — Z79.4 TYPE 2 DIABETES MELLITUS WITH OTHER CIRCULATORY COMPLICATION, WITH LONG-TERM CURRENT USE OF INSULIN (HCC): ICD-10-CM

## 2025-04-17 DIAGNOSIS — M54.50 CHRONIC BILATERAL LOW BACK PAIN WITHOUT SCIATICA: ICD-10-CM

## 2025-04-17 DIAGNOSIS — M25.551 BILATERAL HIP PAIN: ICD-10-CM

## 2025-04-17 DIAGNOSIS — M25.552 BILATERAL HIP PAIN: ICD-10-CM

## 2025-04-17 DIAGNOSIS — I10 PRIMARY HYPERTENSION: ICD-10-CM

## 2025-04-17 DIAGNOSIS — G89.29 CHRONIC BILATERAL LOW BACK PAIN WITHOUT SCIATICA: ICD-10-CM

## 2025-04-17 DIAGNOSIS — E55.9 VITAMIN D DEFICIENCY: ICD-10-CM

## 2025-04-17 DIAGNOSIS — E11.59 TYPE 2 DIABETES MELLITUS WITH OTHER CIRCULATORY COMPLICATION, WITH LONG-TERM CURRENT USE OF INSULIN (HCC): ICD-10-CM

## 2025-04-17 LAB
25(OH)D3 SERPL-MCNC: 25.3 NG/ML
ALBUMIN SERPL-MCNC: 3.8 G/DL (ref 3.4–5)
ALBUMIN/GLOB SERPL: 1.4 {RATIO} (ref 1.1–2.2)
ALP SERPL-CCNC: 181 U/L (ref 40–129)
ALT SERPL-CCNC: 19 U/L (ref 10–40)
ANION GAP SERPL CALCULATED.3IONS-SCNC: 10 MMOL/L (ref 3–16)
AST SERPL-CCNC: 13 U/L (ref 15–37)
BASOPHILS # BLD: 0.1 K/UL (ref 0–0.2)
BASOPHILS NFR BLD: 0.9 %
BILIRUB SERPL-MCNC: 0.3 MG/DL (ref 0–1)
BUN SERPL-MCNC: 13 MG/DL (ref 7–20)
CALCIUM SERPL-MCNC: 9.5 MG/DL (ref 8.3–10.6)
CHLORIDE SERPL-SCNC: 98 MMOL/L (ref 99–110)
CHOLEST SERPL-MCNC: 145 MG/DL (ref 0–199)
CO2 SERPL-SCNC: 31 MMOL/L (ref 21–32)
CREAT SERPL-MCNC: 0.8 MG/DL (ref 0.6–1.2)
CREAT UR-MCNC: 206 MG/DL (ref 28–259)
DEPRECATED RDW RBC AUTO: 13.6 % (ref 12.4–15.4)
EOSINOPHIL # BLD: 0.2 K/UL (ref 0–0.6)
EOSINOPHIL NFR BLD: 2.6 %
GFR SERPLBLD CREATININE-BSD FMLA CKD-EPI: 80 ML/MIN/{1.73_M2}
GLUCOSE SERPL-MCNC: 202 MG/DL (ref 70–99)
HCT VFR BLD AUTO: 38.4 % (ref 36–48)
HDLC SERPL-MCNC: 58 MG/DL (ref 40–60)
HGB BLD-MCNC: 13 G/DL (ref 12–16)
LDL CHOLESTEROL: 73 MG/DL
LYMPHOCYTES # BLD: 2.7 K/UL (ref 1–5.1)
LYMPHOCYTES NFR BLD: 30.2 %
MCH RBC QN AUTO: 31.4 PG (ref 26–34)
MCHC RBC AUTO-ENTMCNC: 33.7 G/DL (ref 31–36)
MCV RBC AUTO: 92.9 FL (ref 80–100)
MICROALBUMIN UR DL<=1MG/L-MCNC: <1.2 MG/DL
MICROALBUMIN/CREAT UR: NORMAL MG/G (ref 0–30)
MONOCYTES # BLD: 0.6 K/UL (ref 0–1.3)
MONOCYTES NFR BLD: 6.8 %
NEUTROPHILS # BLD: 5.3 K/UL (ref 1.7–7.7)
NEUTROPHILS NFR BLD: 59.5 %
PLATELET # BLD AUTO: 236 K/UL (ref 135–450)
PMV BLD AUTO: 11 FL (ref 5–10.5)
POTASSIUM SERPL-SCNC: 3.7 MMOL/L (ref 3.5–5.1)
PROT SERPL-MCNC: 6.6 G/DL (ref 6.4–8.2)
RBC # BLD AUTO: 4.14 M/UL (ref 4–5.2)
SODIUM SERPL-SCNC: 139 MMOL/L (ref 136–145)
TRIGL SERPL-MCNC: 71 MG/DL (ref 0–150)
TSH SERPL DL<=0.005 MIU/L-ACNC: 1.68 UIU/ML (ref 0.27–4.2)
VLDLC SERPL CALC-MCNC: 14 MG/DL
WBC # BLD AUTO: 8.9 K/UL (ref 4–11)

## 2025-04-18 LAB
EST. AVERAGE GLUCOSE BLD GHB EST-MCNC: 168.6 MG/DL
HBA1C MFR BLD: 7.5 %

## 2025-04-21 RX ORDER — VERAPAMIL HYDROCHLORIDE 240 MG/1
240 TABLET, FILM COATED, EXTENDED RELEASE ORAL NIGHTLY
Qty: 90 TABLET | Refills: 1 | Status: SHIPPED | OUTPATIENT
Start: 2025-04-21 | End: 2025-04-24

## 2025-04-21 RX ORDER — LOSARTAN POTASSIUM 100 MG/1
100 TABLET ORAL DAILY
Qty: 90 TABLET | Refills: 1 | Status: SHIPPED | OUTPATIENT
Start: 2025-04-21 | End: 2025-04-24

## 2025-04-21 SDOH — HEALTH STABILITY: PHYSICAL HEALTH: ON AVERAGE, HOW MANY DAYS PER WEEK DO YOU ENGAGE IN MODERATE TO STRENUOUS EXERCISE (LIKE A BRISK WALK)?: 0 DAYS

## 2025-04-21 ASSESSMENT — PATIENT HEALTH QUESTIONNAIRE - PHQ9
7. TROUBLE CONCENTRATING ON THINGS, SUCH AS READING THE NEWSPAPER OR WATCHING TELEVISION: NEARLY EVERY DAY
SUM OF ALL RESPONSES TO PHQ QUESTIONS 1-9: 12
2. FEELING DOWN, DEPRESSED OR HOPELESS: MORE THAN HALF THE DAYS
3. TROUBLE FALLING OR STAYING ASLEEP: MORE THAN HALF THE DAYS
6. FEELING BAD ABOUT YOURSELF - OR THAT YOU ARE A FAILURE OR HAVE LET YOURSELF OR YOUR FAMILY DOWN: NOT AT ALL
SUM OF ALL RESPONSES TO PHQ QUESTIONS 1-9: 12
9. THOUGHTS THAT YOU WOULD BE BETTER OFF DEAD, OR OF HURTING YOURSELF: NOT AT ALL
SUM OF ALL RESPONSES TO PHQ QUESTIONS 1-9: 12
10. IF YOU CHECKED OFF ANY PROBLEMS, HOW DIFFICULT HAVE THESE PROBLEMS MADE IT FOR YOU TO DO YOUR WORK, TAKE CARE OF THINGS AT HOME, OR GET ALONG WITH OTHER PEOPLE: VERY DIFFICULT
5. POOR APPETITE OR OVEREATING: SEVERAL DAYS
4. FEELING TIRED OR HAVING LITTLE ENERGY: NEARLY EVERY DAY
SUM OF ALL RESPONSES TO PHQ QUESTIONS 1-9: 12
1. LITTLE INTEREST OR PLEASURE IN DOING THINGS: SEVERAL DAYS
8. MOVING OR SPEAKING SO SLOWLY THAT OTHER PEOPLE COULD HAVE NOTICED. OR THE OPPOSITE, BEING SO FIGETY OR RESTLESS THAT YOU HAVE BEEN MOVING AROUND A LOT MORE THAN USUAL: NOT AT ALL

## 2025-04-21 ASSESSMENT — LIFESTYLE VARIABLES
HOW OFTEN DO YOU HAVE A DRINK CONTAINING ALCOHOL: PATIENT DECLINED
HOW OFTEN DO YOU HAVE A DRINK CONTAINING ALCOHOL: 98

## 2025-04-22 ENCOUNTER — RESULTS FOLLOW-UP (OUTPATIENT)
Dept: INTERNAL MEDICINE CLINIC | Age: 70
End: 2025-04-22

## 2025-04-24 ENCOUNTER — OFFICE VISIT (OUTPATIENT)
Dept: INTERNAL MEDICINE CLINIC | Age: 70
End: 2025-04-24
Payer: MEDICARE

## 2025-04-24 VITALS
WEIGHT: 293 LBS | HEIGHT: 67 IN | HEART RATE: 70 BPM | DIASTOLIC BLOOD PRESSURE: 70 MMHG | BODY MASS INDEX: 45.99 KG/M2 | SYSTOLIC BLOOD PRESSURE: 130 MMHG | OXYGEN SATURATION: 98 %

## 2025-04-24 DIAGNOSIS — Z00.00 MEDICARE ANNUAL WELLNESS VISIT, SUBSEQUENT: Primary | ICD-10-CM

## 2025-04-24 DIAGNOSIS — E11.59 TYPE 2 DIABETES MELLITUS WITH OTHER CIRCULATORY COMPLICATION, WITH LONG-TERM CURRENT USE OF INSULIN (HCC): ICD-10-CM

## 2025-04-24 DIAGNOSIS — F32.A DEPRESSION, UNSPECIFIED DEPRESSION TYPE: ICD-10-CM

## 2025-04-24 DIAGNOSIS — E66.813 OBESITY, CLASS 3 (HCC): ICD-10-CM

## 2025-04-24 DIAGNOSIS — R60.0 BILATERAL LEG EDEMA: ICD-10-CM

## 2025-04-24 DIAGNOSIS — Z79.4 TYPE 2 DIABETES MELLITUS WITH OTHER CIRCULATORY COMPLICATION, WITH LONG-TERM CURRENT USE OF INSULIN (HCC): ICD-10-CM

## 2025-04-24 PROCEDURE — 3051F HG A1C>EQUAL 7.0%<8.0%: CPT | Performed by: STUDENT IN AN ORGANIZED HEALTH CARE EDUCATION/TRAINING PROGRAM

## 2025-04-24 PROCEDURE — 3017F COLORECTAL CA SCREEN DOC REV: CPT | Performed by: STUDENT IN AN ORGANIZED HEALTH CARE EDUCATION/TRAINING PROGRAM

## 2025-04-24 PROCEDURE — G0439 PPPS, SUBSEQ VISIT: HCPCS | Performed by: STUDENT IN AN ORGANIZED HEALTH CARE EDUCATION/TRAINING PROGRAM

## 2025-04-24 PROCEDURE — 1159F MED LIST DOCD IN RCRD: CPT | Performed by: STUDENT IN AN ORGANIZED HEALTH CARE EDUCATION/TRAINING PROGRAM

## 2025-04-24 PROCEDURE — 1123F ACP DISCUSS/DSCN MKR DOCD: CPT | Performed by: STUDENT IN AN ORGANIZED HEALTH CARE EDUCATION/TRAINING PROGRAM

## 2025-04-24 PROCEDURE — 3075F SYST BP GE 130 - 139MM HG: CPT | Performed by: STUDENT IN AN ORGANIZED HEALTH CARE EDUCATION/TRAINING PROGRAM

## 2025-04-24 PROCEDURE — 3078F DIAST BP <80 MM HG: CPT | Performed by: STUDENT IN AN ORGANIZED HEALTH CARE EDUCATION/TRAINING PROGRAM

## 2025-04-24 RX ORDER — VERAPAMIL HYDROCHLORIDE 240 MG/1
240 TABLET, FILM COATED, EXTENDED RELEASE ORAL NIGHTLY
Qty: 90 TABLET | Refills: 1 | Status: SHIPPED | OUTPATIENT
Start: 2025-04-24

## 2025-04-24 RX ORDER — FUROSEMIDE 40 MG/1
40 TABLET ORAL 2 TIMES DAILY
Qty: 60 TABLET | Refills: 1 | Status: SHIPPED | OUTPATIENT
Start: 2025-04-24

## 2025-04-24 RX ORDER — LOSARTAN POTASSIUM 100 MG/1
100 TABLET ORAL DAILY
Qty: 90 TABLET | Refills: 1 | Status: SHIPPED | OUTPATIENT
Start: 2025-04-24

## 2025-04-24 NOTE — ASSESSMENT & PLAN NOTE
Has been much improved.  A1c now 7.2% continue to follow-up with endocrinology  On chronic insulin therapy  Continue CGM, denies any hypoglycemic episodes  Eye exam up-to-date, again in 3/2025 - floater? Getting injection  Foot exam per pods - recommended diabetic shoes  On statin

## 2025-04-24 NOTE — ASSESSMENT & PLAN NOTE
Seems to be slight worsen   PHQ 9 today was 12  Currently on Prozac 40 mg   I discussed w the patient to add Wellbutrin but she wants to hold off on new meds  Will start therapy - will have patient see Dr. Chan

## 2025-04-24 NOTE — ASSESSMENT & PLAN NOTE
Within normal limits for age- cont to work no ADL issues,immunizations up to date (Shingrix), no depression ,no cognitive impairment  Colonoscopy up to date, repeat soon, has scheduled ? Last done?  Mammogram last done in 2024 repeat annual   Dexa scan last done, every 6 months   PAP smear is completed   Eye exam up to date- diabetic retinopathy, cataract  Exercises as tolerated    ACP information provided   Findings and recommendations discussed with Pt  Discussed labs

## 2025-04-24 NOTE — PROGRESS NOTES
driving, watching TV, or doing any of your daily activities because of your eyesight?: (!) (Patient-Rptd) Yes  Have you had an eye exam within the past year?: (Patient-Rptd) Yes  Interventions:   Patient encouraged to make appointment with their eye specialist    Safety:  Do you have working smoke detectors?: (!) (Patient-Rptd) No  Do you have non-slip mats or non-slip surfaces or shower bars or grab bars in your shower or bathtub?: (!) (Patient-Rptd) No  Interventions:  See AVS for additional education material    ADL's:   Patient reports needing help with:  Select all that apply: (!) (Patient-Rptd) Dressing, Walking/Balance  Select all that apply: (!) (Patient-Rptd) Laundry, Housekeeping, Food Preparation  Interventions:  See AVS for additional education material                  Objective   Vitals:    04/24/25 0827   BP: 130/70   Pulse: 70   SpO2: 98%   Weight: (!) 149.7 kg (330 lb)   Height: 1.702 m (5' 7\")      Body mass index is 51.69 kg/m².      Physical Exam  Vitals and nursing note reviewed.   Constitutional:       General: She is not in acute distress.     Appearance: Normal appearance. She is well-developed. She is obese. She is not ill-appearing, toxic-appearing or diaphoretic.   HENT:      Head: Normocephalic and atraumatic.   Eyes:      General: No scleral icterus.     Conjunctiva/sclera: Conjunctivae normal.      Pupils: Pupils are equal, round, and reactive to light.   Cardiovascular:      Rate and Rhythm: Normal rate and regular rhythm.      Pulses: Normal pulses.      Heart sounds: Normal heart sounds. No murmur heard.     No friction rub. No gallop.   Pulmonary:      Effort: Pulmonary effort is normal. No respiratory distress.      Breath sounds: Normal breath sounds. No wheezing or rales.   Chest:      Chest wall: No tenderness.   Abdominal:      General: Bowel sounds are normal. There is no distension.      Palpations: Abdomen is soft.   Musculoskeletal:         General: No tenderness or

## 2025-04-24 NOTE — PATIENT INSTRUCTIONS
recognizing different colors is important, such as , electronics, or the .  How are vision tests done?  Visual acuity test   You cover one eye at a time.  You read aloud from a wall chart across the room.  You read aloud from a small card that you hold in your hand.  Refraction   You look into a special device.  The device puts lenses of different strengths in front of each eye to see how strong your glasses or contact lenses need to be.  Visual field tests   Your doctor may have you look through special machines.  Or your doctor may simply have you stare straight ahead while they move a finger into and out of your field of vision.  Color vision test   You look at pieces of printed test patterns in various colors. You say what number or symbol you see.  Your doctor may have you trace the number or symbol using a pointer.  How do these tests feel?  There is very little chance of having a problem from this test. If dilating drops are used for a vision test, they may make the eyes sting and cause a medicine taste in the mouth.  Follow-up care is a key part of your treatment and safety. Be sure to make and go to all appointments, and call your doctor if you are having problems. It's also a good idea to know your test results and keep a list of the medicines you take.  Where can you learn more?  Go to https://www.[employer].net/patientEd and enter G551 to learn more about \"Learning About Vision Tests.\"  Current as of: July 31, 2024  Content Version: 14.4  © 3331-9079 [employer].   Care instructions adapted under license by [employer]. If you have questions about a medical condition or this instruction, always ask your healthcare professional. [employer] disclaims any warranty or liability for your use of this information.         Learning About Activities of Daily Living  What are activities of daily living?     Activities of daily living (ADLs) are the basic self-care

## 2025-04-24 NOTE — ASSESSMENT & PLAN NOTE
Jose Francisco 2/2 venous insufficiency   Lasix 20 mg bid. Monitor sx at home might need to increase dose   Start compression stocking  Elevate legs  Pending pneumatic device per pods

## 2025-05-02 ENCOUNTER — TELEMEDICINE (OUTPATIENT)
Dept: ENDOCRINOLOGY | Age: 70
End: 2025-05-02

## 2025-05-02 DIAGNOSIS — E11.65 UNCONTROLLED TYPE 2 DIABETES MELLITUS WITH HYPERGLYCEMIA (HCC): Primary | ICD-10-CM

## 2025-05-02 DIAGNOSIS — E04.2 MULTIPLE THYROID NODULES: ICD-10-CM

## 2025-05-02 RX ORDER — GABAPENTIN 600 MG/1
600 TABLET ORAL 3 TIMES DAILY
Qty: 270 TABLET | Refills: 3 | Status: SHIPPED | OUTPATIENT
Start: 2025-05-02 | End: 2026-05-02

## 2025-05-02 NOTE — PROGRESS NOTES
tablet 4    montelukast (SINGULAIR) 10 MG tablet Take 1 tablet by mouth nightly 90 tablet 3    Insulin Pen Needle 32G X 4 MM MISC 1 each by Does not apply route daily 100 each 3    loratadine (CLARITIN) 10 MG tablet Take 10 mg by mouth daily      ONE TOUCH LANCETS MISC 1 each by Does not apply route daily 100 each 3     No current facility-administered medications for this visit.        Review of Systems    Constitutional: Negative for weight loss and malaise/fatigue. Negative for fever and chills.   HENT: Negative for hearing loss, ear pain, nosebleeds, neck pain and tinnitus.    Eyes: Negative for blurred vision. Negative for double vision, photophobia and pain.   Respiratory: Negative for cough and sputum production.    Cardiovascular: Negative for chest pain, palpitations and leg swelling.   Gastrointestinal: Negative for nausea, vomiting and abdominal pain.   Genitourinary: Negative for dysuria, urgency and frequency.   Musculoskeletal: Negative for back pain. No joint pain  Skin: Negative for itching and rash.   Neurological: Negative for dizziness. Negative for tingling, tremors, focal weakness and headaches.   Endo/Heme/Allergies: see HPI  Psychiatric/Behavioral: Negative for depression and substance abuse.         2/24  Skeletal foot exam is normal, no skin lesions, toenails are normal,10 g monofilament is 10/10 on the right and 10/10 on the left      Lab Reviewed   No components found for: CHLPL  Lab Results   Component Value Date    TRIG 54 03/14/2020    TRIG 75 01/15/2019    TRIG 60 09/21/2017     Lab Results   Component Value Date    HDL 55 03/14/2020    HDL 63 (H) 01/15/2019    HDL 66 (H) 09/21/2017     Lab Results   Component Value Date    LDLCALC 58 03/14/2020    LDLCALC 83 01/15/2019    LDLCALC 51 09/21/2017     Lab Results   Component Value Date    LABVLDL 11 03/14/2020    LABVLDL 15 01/15/2019    LABVLDL 12 09/21/2017     Lab Results   Component Value Date    LABA1C 10.5 12/31/2019

## 2025-05-06 ENCOUNTER — TELEPHONE (OUTPATIENT)
Dept: INTERNAL MEDICINE CLINIC | Age: 70
End: 2025-05-06

## 2025-05-06 NOTE — TELEPHONE ENCOUNTER
Pt needs her lymphedema referral corrected. States she spoke with PT in [place] and they don't do lymphedema therapy.

## 2025-05-06 NOTE — TELEPHONE ENCOUNTER
Pt called back, [facility] does not have any openings. Can she get a referral to outside [facility] somewhere near [facility]

## 2025-05-07 NOTE — TELEPHONE ENCOUNTER
[facility]  [address] [place], [place] [id number]  Phone:  [phone]     I spoke with Pia and gave her this information. I also gave her the phone number to Dr Lemus. She states she has an appointment next week at the [facility] but will call  also.

## 2025-05-16 ENCOUNTER — HOSPITAL ENCOUNTER (OUTPATIENT)
Dept: PHYSICAL THERAPY | Age: 70
Setting detail: THERAPIES SERIES
Discharge: HOME | End: 2025-05-16
Payer: MEDICARE

## 2025-05-16 DIAGNOSIS — I89.0 LYMPHEDEMA: Primary | ICD-10-CM

## 2025-05-16 DIAGNOSIS — Z74.1 REQUIRES ASSISTANCE WITH ACTIVITIES OF DAILY LIVING (ADL): ICD-10-CM

## 2025-05-16 DIAGNOSIS — Z91.81 RISK FOR FALLS: ICD-10-CM

## 2025-05-16 PROCEDURE — 97161 PT EVAL LOW COMPLEX 20 MIN: CPT

## 2025-05-16 PROCEDURE — 97530 THERAPEUTIC ACTIVITIES: CPT

## 2025-05-16 NOTE — PLAN OF CARE
[facility] - Outpatient Rehabilitation and Therapy: [address], [place] [address] office: [phone] fax: [phone]     Physical Therapy Initial Evaluation Certification      Dear Dr. Meena Castelan MD    We had the pleasure of evaluating the following patient for physical therapy services at [facility].  A summary of our findings can be found in the initial assessment below.  This includes our plan of care.  If you have any questions or concerns regarding these findings, please do not hesitate to contact me at the office phone number listed above.  Thank you for the referral.     Physician Signature:_______________________________Date:__________________  By signing above (or electronic signature), therapist’s plan is approved by physician       Physical Therapy: TREATMENT/PROGRESS NOTE   Patient: Pia El (69 y.o. female)   Examination Date: 2025   :  1955 MRN: [id number]   Visit #:  requested   Insurance Allowable Auth Needed   Med nec [x]Yes - optum  []No    Insurance: Payor: [facility] MEDICARE / Plan: [facility] MEDICARE COMPLETE / Product Type: *No Product type* /   Insurance ID: [id number] - (Medicare Managed)  Secondary Insurance (if applicable):    Treatment Diagnosis:     ICD-10-CM    1. Lymphedema  I89.0       2. Risk for falls  Z91.81       3. Requires assistance with activities of daily living (ADL)  Z74.1          Medical Diagnosis:  Bilateral leg edema [R60.0]  Lymphedema [I89.0]   Referring Physician: Meena Castelan MD  PCP: Meena Castelan MD     Plan of care signed (Y/N):     Date of Patient follow up with Physician:      Plan of Care Report: EVAL today  POC update due: (10 visits /OR AUTH LIMITS, whichever is less)  2025                                             Medical History:  Comorbidities:  Diabetes (Type I or II)  Hypertension  Osteoarthritis  Anxiety  Depression  Other: asthma, SOB with prolonged

## 2025-05-20 ENCOUNTER — APPOINTMENT (OUTPATIENT)
Dept: PHYSICAL THERAPY | Age: 70
End: 2025-05-20
Payer: MEDICARE

## 2025-05-21 ENCOUNTER — HOSPITAL ENCOUNTER (OUTPATIENT)
Dept: PHYSICAL THERAPY | Age: 70
Setting detail: THERAPIES SERIES
Discharge: HOME | End: 2025-05-21
Payer: MEDICARE

## 2025-05-21 ENCOUNTER — APPOINTMENT (OUTPATIENT)
Dept: PHYSICAL THERAPY | Age: 70
End: 2025-05-21
Payer: MEDICARE

## 2025-05-21 ENCOUNTER — TELEPHONE (OUTPATIENT)
Dept: INTERNAL MEDICINE CLINIC | Age: 70
End: 2025-05-21

## 2025-05-21 PROCEDURE — 97530 THERAPEUTIC ACTIVITIES: CPT

## 2025-05-21 NOTE — TELEPHONE ENCOUNTER
Per Dr Patel, pt will need to be seen. Dr Castelan is still out of the office until next week. Appointment scheduled with Dr Patel for tomorrow morning.   Katie at PT has been notified and thanked MD.

## 2025-05-21 NOTE — FLOWSHEET NOTE
lymphedema, and reduce risk for wounds and infection. Pt will require lifetime compression, but garments being ordered will be worn for 6 months. Pt requires garments listed below:    Circaid full leg reduction kit with lobe kit - 1 each leg  Circaid ankle foot wrap - 1 each leg  Circaid toe caps - 1 each leg   Nikita [employer] auto fit full leg - 1 each leg        ASSESSMENT     Today's Assessment:  Pt with increased redness, warmth, and tenderness/pain in B LE - pt reports swelling was worse than normal last night. PT contacted PCP office regarding concern for infection. Following visit, PCP office called to say they scheduled for for an appointment tomorrow morning. Most of appointment spend on education on diet, CDT, and anatomy of lymphatic system. Pt to hold compression and pump until she sees PCP - if no infection, resume treatment. If given an antibiotic hold for 24 hrs then resume compression and pump as long as symptoms have improved.  Compression to be delivered tomorrow and pt to bring to [place] - plan to fit circaid reduction kit and resume MLD.      Medical Necessity Documentation:  I certify that this patient meets the below criteria necessary for medical necessity for care and/or justification of therapy services:  The patient has functional impairments and/or activity limitations and would benefit from continued outpatient therapy services to address the deficits outlined in the patients goals    Prognosis for POC: [x] Good [] Fair  [] Poor    Patient requires continued skilled intervention: [x] Yes  [] No    CHARGE CAPTURE     PT CHARGE GRID   CPT Code (TIMED) minutes # CPT Code (UNTIMED) #     Therex (85527)     EVAL:LOW (95668 - Typically 20 minutes face-to-face)     Neuromusc. Re-ed (30366)    Re-Eval (76784)     Manual (82143) 10 0  Estim Unattended (80627)     Ther. Act (14481) 40 3  Mech. Traction (79211)     Gait (11728)    Dry Needle 1-2 muscle (57420)     Aquatic Therex (58370)    Dry Needle

## 2025-05-21 NOTE — TELEPHONE ENCOUNTER
[facility] physical therapist, called concerned about patient's Lymphedema in both legs, stopped treatment for 5/21, patient says legs are painful/redness, please call Katie [phone].

## 2025-05-22 ENCOUNTER — OFFICE VISIT (OUTPATIENT)
Dept: INTERNAL MEDICINE CLINIC | Age: 70
End: 2025-05-22
Payer: MEDICARE

## 2025-05-22 ENCOUNTER — TELEPHONE (OUTPATIENT)
Dept: INTERNAL MEDICINE CLINIC | Age: 70
End: 2025-05-22

## 2025-05-22 VITALS
DIASTOLIC BLOOD PRESSURE: 68 MMHG | SYSTOLIC BLOOD PRESSURE: 158 MMHG | OXYGEN SATURATION: 99 % | HEART RATE: 77 BPM | TEMPERATURE: 98 F

## 2025-05-22 DIAGNOSIS — L03.90 CELLULITIS, UNSPECIFIED CELLULITIS SITE: ICD-10-CM

## 2025-05-22 DIAGNOSIS — I89.0 LYMPHEDEMA: Primary | ICD-10-CM

## 2025-05-22 PROCEDURE — G8399 PT W/DXA RESULTS DOCUMENT: HCPCS | Performed by: INTERNAL MEDICINE

## 2025-05-22 PROCEDURE — 3077F SYST BP >= 140 MM HG: CPT | Performed by: INTERNAL MEDICINE

## 2025-05-22 PROCEDURE — G8417 CALC BMI ABV UP PARAM F/U: HCPCS | Performed by: INTERNAL MEDICINE

## 2025-05-22 PROCEDURE — 1090F PRES/ABSN URINE INCON ASSESS: CPT | Performed by: INTERNAL MEDICINE

## 2025-05-22 PROCEDURE — 99214 OFFICE O/P EST MOD 30 MIN: CPT | Performed by: INTERNAL MEDICINE

## 2025-05-22 PROCEDURE — 1036F TOBACCO NON-USER: CPT | Performed by: INTERNAL MEDICINE

## 2025-05-22 PROCEDURE — G8427 DOCREV CUR MEDS BY ELIG CLIN: HCPCS | Performed by: INTERNAL MEDICINE

## 2025-05-22 PROCEDURE — 1123F ACP DISCUSS/DSCN MKR DOCD: CPT | Performed by: INTERNAL MEDICINE

## 2025-05-22 PROCEDURE — 3017F COLORECTAL CA SCREEN DOC REV: CPT | Performed by: INTERNAL MEDICINE

## 2025-05-22 PROCEDURE — 3078F DIAST BP <80 MM HG: CPT | Performed by: INTERNAL MEDICINE

## 2025-05-22 PROCEDURE — 1159F MED LIST DOCD IN RCRD: CPT | Performed by: INTERNAL MEDICINE

## 2025-05-22 RX ORDER — TORSEMIDE 20 MG/1
20 TABLET ORAL 2 TIMES DAILY
Qty: 60 TABLET | Refills: 3 | Status: SHIPPED | OUTPATIENT
Start: 2025-05-22 | End: 2025-05-23 | Stop reason: SDUPTHER

## 2025-05-22 RX ORDER — DOXYCYCLINE HYCLATE 100 MG
100 TABLET ORAL 2 TIMES DAILY
Qty: 20 TABLET | Refills: 0 | Status: SHIPPED | OUTPATIENT
Start: 2025-05-22 | End: 2025-05-23 | Stop reason: SDUPTHER

## 2025-05-22 ASSESSMENT — PATIENT HEALTH QUESTIONNAIRE - PHQ9
2. FEELING DOWN, DEPRESSED OR HOPELESS: SEVERAL DAYS
1. LITTLE INTEREST OR PLEASURE IN DOING THINGS: SEVERAL DAYS
SUM OF ALL RESPONSES TO PHQ QUESTIONS 1-9: 2

## 2025-05-22 NOTE — PROGRESS NOTES
Take 2 tablets by mouth 2 times daily 10/8/24 5/22/25 Yes Meena Castelan MD   betamethasone dipropionate 0.05 % ointment Apply topically 2 times daily. 6/14/24  Yes Meena Castelan MD   busPIRone (BUSPAR) 15 MG tablet TAKE 1 TABLET THREE TIMES A DAY AS NEEDED FOR ANXIETY 10/11/23  Yes Meena Castelan MD   ASPIRIN 81 PO Take by mouth   Yes Maggie Gibbs MD   diclofenac sodium 1 % GEL Apply 4 g topically 4 times daily 1/27/20  Yes Piper Graham MD   Continuous Glucose Sensor (FREESTYLE MING 2 SENSOR) MISC Every 2 weeks 1/28/25   Leonel Porras MD   ALPRAZolam (XANAX) 0.25 MG tablet One tab PO as needed before procedure  Patient not taking: Reported on 5/22/2025 1/28/25 1/28/26  Leonel Porras MD   omeprazole (PRILOSEC) 40 MG delayed release capsule Take 1 capsule by mouth every morning (before breakfast)  Patient not taking: Reported on 5/22/2025 3/11/24   Meena Castelan MD   Continuous Blood Gluc  (FREESTYLE MING 2 READER) YOANDY To check glucose levels 11/22/23   Leonel Porras MD   Insulin Pen Needle 32G X 4 MM MISC 1 each by Does not apply route 3 times daily 3/8/22   Leonel Porras MD   Continuous Blood Gluc Sensor (FREESTYLE MING 14 DAY SENSOR) MISC 1 Units by Does not apply route every 14 days 10/28/21   Nevaeh Gomez APRN - CNP   Continuous Blood Gluc  (FREESTYLE MING 14 DAY READER) YOANDY 1 Units by Does not apply route daily 10/28/21   Nevaeh Gomez APRN - CNP       Physical Exam:  Vital Signs: BP (!) 158/68   Pulse 77   Temp 98 °F (36.7 °C) (Temporal)   LMP  (LMP Unknown)   SpO2 99%   General: Patient appears  non-toxic  HENT: Atraumatic, normocephalic, oral mucosa moist  Lungs:  Clear bilaterally  Heart: Regular rate and rhythm  Abdomen: Non-distended, soft, non-tender  Extremities: 3+ edema bilaterally with warmth bilaterally  Neuro: Nonfocal    Medical Decision Making and Plan:  Pertinent Labs & Imaging studies reviewed. (See chart for

## 2025-05-22 NOTE — TELEPHONE ENCOUNTER
Pt called she is following up on a discussion had with Neeta regarding information on Darlyn.  Please call and advise  [phone]

## 2025-05-23 ENCOUNTER — APPOINTMENT (OUTPATIENT)
Dept: PHYSICAL THERAPY | Age: 70
End: 2025-05-23
Payer: MEDICARE

## 2025-05-23 DIAGNOSIS — L03.90 CELLULITIS, UNSPECIFIED CELLULITIS SITE: ICD-10-CM

## 2025-05-23 DIAGNOSIS — I89.0 LYMPHEDEMA: ICD-10-CM

## 2025-05-23 RX ORDER — DOXYCYCLINE HYCLATE 100 MG
100 TABLET ORAL 2 TIMES DAILY
Qty: 20 TABLET | Refills: 0 | OUTPATIENT
Start: 2025-05-23 | End: 2025-06-02

## 2025-05-23 RX ORDER — TORSEMIDE 20 MG/1
20 TABLET ORAL 2 TIMES DAILY
Qty: 60 TABLET | Refills: 3 | Status: SHIPPED | OUTPATIENT
Start: 2025-05-23

## 2025-05-24 PROBLEM — Z00.00 MEDICARE ANNUAL WELLNESS VISIT, SUBSEQUENT: Status: RESOLVED | Noted: 2024-04-22 | Resolved: 2025-05-24

## 2025-05-27 ENCOUNTER — HOSPITAL ENCOUNTER (OUTPATIENT)
Dept: PHYSICAL THERAPY | Age: 70
Setting detail: THERAPIES SERIES
Discharge: HOME | End: 2025-05-27
Payer: MEDICARE

## 2025-05-27 PROCEDURE — 97530 THERAPEUTIC ACTIVITIES: CPT | Performed by: SPECIALIST

## 2025-05-27 NOTE — FLOWSHEET NOTE
Performance Test (78667)    Custom orthotic ()     Other:    Other:    Total Timed Code Tx Minutes 70 5       Total Treatment Minutes 70        Charge Justification:  (30580) THERAPEUTIC ACTIVITY - use of dynamic activities to improve functional performance. (Ex include squatting, ascending/descending stairs, walking, bending, lifting, catching, throwing, pushing, pulling, jumping.)  Direct, one on one contact, billed in 15-minute increments.    GOALS     Patient stated goal: walk better, reduce pain  Status: [] Progressing: [] Met: [] Not Met: [] Adjusted    Therapist goals for Patient:   Short Term Goals: To be achieved in: 2 weeks  Independent in HEP and progression per patient tolerance, in order to progress toward full function and prevent re-injury.    Status: [] Progressing: [] Met: [] Not Met: [] Adjusted  Patient will have a decrease in pain to 4/10 at worst to help facilitate improvement in movement, function, and ADLs as indicated by functional deficits.   Status: [] Progressing: [] Met: [] Not Met: [] Adjusted  Pt will demonstrate independence with skin care.     Status: [] Progressing: [] Met: [] Not Met: [] Adjusted    Long Term Goals: To be achieved in: 6 weeks  Disability index score of 40% or less for the LLIS to assist with reaching prior level of function with activities such as ADLs, IADLs, and work related activities.  [] Progressing: [] Met: [] Not Met: [] Adjusted  Pt will report ability to sleep 7/7 nights/wk without waking up due to B LE pain.      Status: [] Progressing: [] Met: [] Not Met: [] Adjusted  Decrease total girth of L LE by 30.0 cm so that pt can return to functional activities including prolonged walking and standing without increased symptoms or restriction.    Status: [] Progressing: [] Met: [] Not Met: [] Adjusted  Decrease total girth of L LE by 30.0 cm so that pt can return to functional activities including prolonged walking and standing without increased symptoms or

## 2025-05-29 ENCOUNTER — APPOINTMENT (OUTPATIENT)
Dept: PHYSICAL THERAPY | Age: 70
End: 2025-05-29
Payer: MEDICARE

## 2025-05-30 DIAGNOSIS — E11.65 UNCONTROLLED TYPE 2 DIABETES MELLITUS WITH HYPERGLYCEMIA (HCC): ICD-10-CM

## 2025-05-30 RX ORDER — TIRZEPATIDE 2.5 MG/.5ML
INJECTION, SOLUTION SUBCUTANEOUS
Qty: 2 ML | Refills: 0 | Status: SHIPPED | OUTPATIENT
Start: 2025-05-30

## 2025-05-30 RX ORDER — INSULIN LISPRO 100 [IU]/ML
INJECTION, SOLUTION INTRAVENOUS; SUBCUTANEOUS
Qty: 60 ML | Refills: 3 | Status: SHIPPED | OUTPATIENT
Start: 2025-05-30

## 2025-05-30 NOTE — TELEPHONE ENCOUNTER
Medication:   Requested Prescriptions     Pending Prescriptions Disp Refills    MOUNJARO 2.5 MG/0.5ML SOAJ pen [Pharmacy Med Name: MOUNJARO PEN 2.5MG/0.5ML] 2 mL 0     Sig: INJECT THE CONTENTS OF ONE PEN  SUBCUTANEOUSLY WEEKLY AS  DIRECTED    insulin lispro, 1 Unit Dial, (HUMALOG KWIKPEN) 100 UNIT/ML SOPN [Pharmacy Med Name: HumaLOG KwikPen 100 UNIT/ML Subcutaneous Solution Pen-injector] 60 mL 3     Sig: INJECT SUBCUTANEOUSLY 20 UNITS  BEFORE MEALS 3 TIMES DAILY       Last Filled:      Patient Phone Number: [phone] (home)     Last appt: 5/2/2025   Next appt: 8/4/2025    Last Labs DM:   Lab Results   Component Value Date/Time    LABA1C 7.5 04/17/2025 06:47 AM

## 2025-06-03 ENCOUNTER — HOSPITAL ENCOUNTER (OUTPATIENT)
Dept: PHYSICAL THERAPY | Age: 70
Setting detail: THERAPIES SERIES
Discharge: HOME | End: 2025-06-03
Payer: MEDICARE

## 2025-06-03 PROCEDURE — 97530 THERAPEUTIC ACTIVITIES: CPT | Performed by: SPECIALIST

## 2025-06-03 PROCEDURE — 97140 MANUAL THERAPY 1/> REGIONS: CPT | Performed by: SPECIALIST

## 2025-06-03 NOTE — FLOWSHEET NOTE
[facility] - Outpatient Rehabilitation and Therapy: [address], [place] [address] office: [phone] fax: [phone]     Physical Therapy: TREATMENT/PROGRESS NOTE   Patient: Pia El (69 y.o. female)   Examination Date: 2025   :  1955 MRN: [id number]   Visit #:  requested   Insurance Allowable Auth Needed   12 visits approved;   2025 - 2025   [x]Yes - optum  []No    Insurance: Payor: [facility] MEDICARE / Plan: [facility] MEDICARE COMPLETE / Product Type: *No Product type* /   Insurance ID: [id number] - (Medicare Managed)  Secondary Insurance (if applicable):    Treatment Diagnosis:     ICD-10-CM    1. Lymphedema  I89.0       2. Risk for falls  Z91.81       3. Requires assistance with activities of daily living (ADL)  Z74.1          Medical Diagnosis:  Bilateral leg edema [R60.0]  Lymphedema [I89.0]   Referring Physician: Meena Castelan MD  PCP: Meena Castelan MD     Plan of care signed (Y/N): Y    Date of Patient follow up with Physician:      Plan of Care Report: NO  POC update due: (10 visits /OR AUTH LIMITS, whichever is less)  2025                                             Medical History:  Comorbidities:  Diabetes (Type I or II)  Hypertension  Osteoarthritis  Anxiety  Depression  Other: asthma, SOB with prolonged walking  Relevant Medical History:                                          Precautions/ Contra-indications:           Latex allergy:  NO  Pacemaker:    NO  Contraindications for Manipulation: None  Date of Surgery:   Other:    Red Flags:  None    Suicide Screening:   The patient did not verbalize a primary behavioral concern, suicidal ideation, suicidal intent, or demonstrate suicidal behaviors.    Preferred Language for Healthcare:  English    SUBJECTIVE EXAMINATION     Patient stated complaint/comment: 6/3: admits that she has not been wearing her Circaids because they don't fit under her work clothes. She has been using her pump

## 2025-06-05 ENCOUNTER — HOSPITAL ENCOUNTER (OUTPATIENT)
Dept: PHYSICAL THERAPY | Age: 70
Setting detail: THERAPIES SERIES
Discharge: HOME | End: 2025-06-05
Payer: MEDICARE

## 2025-06-05 PROCEDURE — 97140 MANUAL THERAPY 1/> REGIONS: CPT

## 2025-06-05 PROCEDURE — 97530 THERAPEUTIC ACTIVITIES: CPT

## 2025-06-05 NOTE — FLOWSHEET NOTE
[facility] - Outpatient Rehabilitation and Therapy: [address], [address], [place] [address] office: [phone] fax: [phone]     Physical Therapy: TREATMENT/PROGRESS NOTE   Patient: Pia El (69 y.o. female)   Examination Date: 2025   :  1955 MRN: [id number]   Visit #:    Insurance Allowable Auth Needed   12 visits approved;   2025 - 2025   [x]Yes - optum  []No    Insurance: Payor: [facility] MEDICARE / Plan: [facility] MEDICARE COMPLETE / Product Type: *No Product type* /   Insurance ID: [id number] - (Medicare Managed)  Secondary Insurance (if applicable):    Treatment Diagnosis:     ICD-10-CM    1. Lymphedema  I89.0       2. Risk for falls  Z91.81       3. Requires assistance with activities of daily living (ADL)  Z74.1          Medical Diagnosis:  Bilateral leg edema [R60.0]  Lymphedema [I89.0]   Referring Physician: Meena Castelan MD  PCP: Meena Castelan MD     Plan of care signed (Y/N): Y    Date of Patient follow up with Physician:      Plan of Care Report: NO  POC update due: (10 visits /OR AUTH LIMITS, whichever is less)  2025                                             Medical History:  Comorbidities:  Diabetes (Type I or II)  Hypertension  Osteoarthritis  Anxiety  Depression  Other: asthma, SOB with prolonged walking  Relevant Medical History:                                          Precautions/ Contra-indications:           Latex allergy:  NO  Pacemaker:    NO  Contraindications for Manipulation: None  Date of Surgery:   Other:    Red Flags:  None    Suicide Screening:   The patient did not verbalize a primary behavioral concern, suicidal ideation, suicidal intent, or demonstrate suicidal behaviors.    Preferred Language for Healthcare:  English    SUBJECTIVE EXAMINATION     Patient stated complaint/comment: Pt reports she has not tried wearing circaids with her work clothes. Reports some soreness at medial thighs. States she is compliant with pump

## 2025-06-07 ENCOUNTER — HOSPITAL ENCOUNTER (OUTPATIENT)
Dept: MAMMOGRAPHY | Age: 70
Discharge: HOME | End: 2025-06-07
Payer: MEDICARE

## 2025-06-07 VITALS — BODY MASS INDEX: 45.99 KG/M2 | WEIGHT: 293 LBS | HEIGHT: 67 IN

## 2025-06-07 DIAGNOSIS — Z12.31 VISIT FOR SCREENING MAMMOGRAM: ICD-10-CM

## 2025-06-07 PROCEDURE — 77063 BREAST TOMOSYNTHESIS BI: CPT

## 2025-06-09 ENCOUNTER — RESULTS FOLLOW-UP (OUTPATIENT)
Dept: INTERNAL MEDICINE CLINIC | Age: 70
End: 2025-06-09

## 2025-06-11 ENCOUNTER — HOSPITAL ENCOUNTER (OUTPATIENT)
Dept: PHYSICAL THERAPY | Age: 70
Setting detail: THERAPIES SERIES
Discharge: HOME | End: 2025-06-11
Payer: MEDICARE

## 2025-06-11 PROCEDURE — 97530 THERAPEUTIC ACTIVITIES: CPT

## 2025-06-11 PROCEDURE — 97140 MANUAL THERAPY 1/> REGIONS: CPT

## 2025-06-11 NOTE — FLOWSHEET NOTE
pump for 24 hours and if symptoms improve, resume compression and exercise.    PCP office contacted PT following pt's visit - pt scheduled appointment for tomorrow morning          Fitting for reduction kits B LE; initiated training on application of all ordered products for day and night time compression; answered patient and spouse questions to their understanding and satisfaction      Taught self MLD and handout provided; discussed compression plan to wear lower leg Circaids during the day at work (can be worn over her stockings), then apply the thigh pieces at home; continue pump and wear of nighttime garments      Manual Intervention (59581) Time: 38 min   MLD B LE - see chart below              Manual Lymph Drainage (MLD):  MLD to B LE, clearing along alternate pathway of ipsilateral IA to ipsilateral axillary lymph nodes    Clear Nodes 10x each   Neck x   [address] x   Axilla x   Abdomen x   Groin x   Popliteal x2 x   Clear Alternate Pathway 10x each   Re-clear alternate pathway   x5 each position x   Location B IA       Fluid Mobilization 10x each   Re-clear alternate pathway   x5 each position x   Shoulder bracing x   Location B LE       Protein Resorption 10x each   Location B medial thighs        Clear Foot/Ankle or Hand 10x each   Achilles x   Bilateral malleolus x   Fan the cards    Clear dorsum x   Clear through web space    Clear toes/fingers    Fluid mobilization x   Re-clear all positions  X5 each x     Modalities:    No modalities applied this session    Education/Home Exercise Program:     5/21 reviewed anatomy of lymphatic system and role of compression, MLD, and exercise. Educated on fibrotic tissue and using foam in autofit to reduce fibrotic tissue.   Provided with LE lymph CDT handout - discussed reduction and maintenance phase. Advised to continue with vaseline for moisture - do not use lidocaine on legs for pain reduction.   Discussed diet - drink at least 64 oz of water and eat low

## 2025-06-12 ENCOUNTER — HOSPITAL ENCOUNTER (OUTPATIENT)
Dept: PHYSICAL THERAPY | Age: 70
Setting detail: THERAPIES SERIES
Discharge: HOME | End: 2025-06-12
Payer: MEDICARE

## 2025-06-12 PROCEDURE — 97110 THERAPEUTIC EXERCISES: CPT

## 2025-06-12 PROCEDURE — 97530 THERAPEUTIC ACTIVITIES: CPT

## 2025-06-12 NOTE — PLAN OF CARE
been templated and/or copied from initial evaluation, reassessments and prior notes for documentation efficiency.    Note: If patient does not return for scheduled/recommended follow up visits, this note will serve as a discharge from care along with the most recent update on progress.    Lymphedema Evaluation

## 2025-06-18 ENCOUNTER — HOSPITAL ENCOUNTER (OUTPATIENT)
Dept: PHYSICAL THERAPY | Age: 70
Setting detail: THERAPIES SERIES
End: 2025-06-18
Payer: MEDICARE

## 2025-06-25 ENCOUNTER — HOSPITAL ENCOUNTER (OUTPATIENT)
Dept: PHYSICAL THERAPY | Age: 70
Setting detail: THERAPIES SERIES
Discharge: HOME | End: 2025-06-25
Payer: MEDICARE

## 2025-06-25 PROCEDURE — 97140 MANUAL THERAPY 1/> REGIONS: CPT

## 2025-06-25 PROCEDURE — 97530 THERAPEUTIC ACTIVITIES: CPT

## 2025-06-25 NOTE — PLAN OF CARE
down when she is in a seated position. States she has not heard from lymphapress regarding lymphapants.     *REQUEST AUTH NV*     Test used Initial score  25 POC  2025   Pain Summary VAS 6-7/10 5-6/10 - mostly in feet and ankles  Did not rate this date   Functional questionnaire LLIS 63/72 - 87.5% dysfunction  47 - 65% dysfunction     Other:                OBJECTIVE EXAMINATION    improved skin health with reduced skin flaking, improved hydration, and reduced hyperpigmentation on B lower legs     R LE total girth decreased by 38.9 cm compared eval 25  L LE total girth decreased by 35.2 cm compared eval 25 increased edema in trunk, reduced fibrotic tissue B medial thighs, reduced redness, improved skin health with reduced dry skin  6/3: decreased fibrosis in the the upper legs B  : redness resolved in B LE; no palpable warmth in lower legs today; fibrosis noted B LE below the knee   increased redness, warmth, and tenderness B lower legs   25  Observation:  Pittin+ - tissue return 2-3 minutes   Fibrotic tissue: Yes - anterior bilat LE, posterior bilat LE, L medial thigh  Color: red streaks - L shin  Skin texture: dry  Skin temperature: normal  Condition of nailbeds: discolored  Skin changes: Papillomas and Hyperkeratosis  Scars: Yes - R knee surgery   Stemmer sign: Positive    Girth Measurement:   Lower Extremity Right (cm) Left (cm) Right (cm) Left (cm)   Date 5/16 -  - Eval  25   Measurements taken as follows:   0 cm at most distal aspect of calcaneus laterally         [x] Lateral aspect of LE    [] sheet              cm  Widest at hip       cm at waist (at umbilicus)       Great toe       Metatarsal heads 24.1 24.5 23.5 23.5          5 cm above distal aspect of calcaneus    35.2 34.0 34.0 32.3   10 cm above distal aspect of calcaneus  32.6 32.3 32.8 30.1   15 cm above distal aspect of calcaneus 40.5 39.4 37.1 36.2   20 cm above

## 2025-06-26 ENCOUNTER — HOSPITAL ENCOUNTER (OUTPATIENT)
Dept: PHYSICAL THERAPY | Age: 70
Setting detail: THERAPIES SERIES
Discharge: HOME | End: 2025-06-26
Payer: MEDICARE

## 2025-06-26 PROCEDURE — 97140 MANUAL THERAPY 1/> REGIONS: CPT | Performed by: SPECIALIST

## 2025-06-26 NOTE — FLOWSHEET NOTE
[facility] - Outpatient Rehabilitation and Therapy: [address], [place] [address] office: [phone] fax: [phone]       Physical Therapy: TREATMENT/PROGRESS NOTE   Patient: Pia El (69 y.o. female)   Examination Date: 2025   :  1955 MRN: [id number]   Visit #:    Insurance Allowable Auth Needed   12 visits approved;   2025 - 2025    Submitted paperwork for extension 2 x week for 5 weeks with date extension 25   [x]Yes - optum  []No    Insurance: Payor: [facility] MEDICARE / Plan: [facility] MEDICARE COMPLETE / Product Type: *No Product type* /   Insurance ID: [id number] - (Medicare Managed)  Secondary Insurance (if applicable):    Treatment Diagnosis:     ICD-10-CM    1. Lymphedema  I89.0       2. Risk for falls  Z91.81       3. Requires assistance with activities of daily living (ADL)  Z74.1          Medical Diagnosis:  Bilateral leg edema [R60.0]  Lymphedema [I89.0]   Referring Physician: Meena Castelan MD  PCP: Meena Castelan MD     Plan of care signed (Y/N): Y    Date of Patient follow up with Physician:      Plan of Care Report: NO  POC update due: (10 visits /OR AUTH LIMITS, whichever is less)  2025                                             Medical History:  Comorbidities:  Diabetes (Type I or II)  Hypertension  Osteoarthritis  Anxiety  Depression  Other: asthma, SOB with prolonged walking  Relevant Medical History:                                          Precautions/ Contra-indications:           Latex allergy:  NO  Pacemaker:    NO  Contraindications for Manipulation: None  Date of Surgery:   Other:    Red Flags:  None    Suicide Screening:   The patient did not verbalize a primary behavioral concern, suicidal ideation, suicidal intent, or demonstrate suicidal behaviors.    Preferred Language for Healthcare:  English    SUBJECTIVE EXAMINATION     Patient stated complaint/comment: feeling more achy today in both legs, likely from the

## 2025-07-02 ENCOUNTER — APPOINTMENT (OUTPATIENT)
Dept: PHYSICAL THERAPY | Age: 70
End: 2025-07-02
Payer: MEDICARE

## 2025-07-02 ENCOUNTER — PATIENT MESSAGE (OUTPATIENT)
Dept: ENDOCRINOLOGY | Age: 70
End: 2025-07-02

## 2025-07-03 ENCOUNTER — HOSPITAL ENCOUNTER (OUTPATIENT)
Dept: PHYSICAL THERAPY | Age: 70
Setting detail: THERAPIES SERIES
End: 2025-07-03
Payer: MEDICARE

## 2025-07-03 RX ORDER — BLOOD-GLUCOSE SENSOR
1 EACH MISCELLANEOUS
Qty: 6 EACH | Refills: 0 | Status: SHIPPED | OUTPATIENT
Start: 2025-07-03

## 2025-07-03 NOTE — TELEPHONE ENCOUNTER
Medication:   Requested Prescriptions     Pending Prescriptions Disp Refills    Continuous Glucose Sensor (FREESTYLE MING 2 PLUS SENSOR) MISC       Sig: by Does not apply route       Last Filled:      Patient Phone Number: [phone] (home)     Last appt: 5/2/2025   Next appt: 8/4/2025    Last Labs DM:   Lab Results   Component Value Date/Time    LABA1C 7.5 04/17/2025 06:47 AM

## 2025-07-07 DIAGNOSIS — E11.65 UNCONTROLLED TYPE 2 DIABETES MELLITUS WITH HYPERGLYCEMIA (HCC): ICD-10-CM

## 2025-07-08 ENCOUNTER — HOSPITAL ENCOUNTER (OUTPATIENT)
Dept: PHYSICAL THERAPY | Age: 70
Setting detail: THERAPIES SERIES
Discharge: HOME | End: 2025-07-08
Payer: MEDICARE

## 2025-07-08 PROCEDURE — 97140 MANUAL THERAPY 1/> REGIONS: CPT | Performed by: SPECIALIST

## 2025-07-08 PROCEDURE — 97530 THERAPEUTIC ACTIVITIES: CPT | Performed by: SPECIALIST

## 2025-07-08 RX ORDER — BETAMETHASONE DIPROPIONATE 0.05 %
OINTMENT (GRAM) TOPICAL 2 TIMES DAILY
Qty: 90 G | Refills: 3 | Status: SHIPPED | OUTPATIENT
Start: 2025-07-08

## 2025-07-08 NOTE — TELEPHONE ENCOUNTER
Last appointment: 4/24/2025  Next appointment: 8/25/2025        Last refill: (6/14/2024) by Meena Castelan MD

## 2025-07-08 NOTE — FLOWSHEET NOTE
education.  Neuromuscular Re-education (50935) activation and proprioception, including postural re-education.    Manual Therapy (05888) as indicated to include: Manual Lymph Drainage  Modalities as needed that may include: Vasoneumatic Compression  Patient education on activity modification, progression of HEP, and lymphedema    Plan: continue with circaids daily, mobiderm autofit, exercise, skin care, and obtain advanced compression pump     Electronically Signed by Ondina Calloway PT, DPT, SINA  Date: 07/08/2025     Note: Portions of this note have been templated and/or copied from initial evaluation, reassessments and prior notes for documentation efficiency.    Note: If patient does not return for scheduled/recommended follow up visits, this note will serve as a discharge from care along with the most recent update on progress.    Lymphedema Evaluation

## 2025-07-09 ENCOUNTER — HOSPITAL ENCOUNTER (OUTPATIENT)
Dept: PHYSICAL THERAPY | Age: 70
Setting detail: THERAPIES SERIES
Discharge: HOME | End: 2025-07-09
Payer: MEDICARE

## 2025-07-09 PROCEDURE — 97140 MANUAL THERAPY 1/> REGIONS: CPT

## 2025-07-09 PROCEDURE — 97530 THERAPEUTIC ACTIVITIES: CPT

## 2025-07-09 RX ORDER — TIRZEPATIDE 2.5 MG/.5ML
INJECTION, SOLUTION SUBCUTANEOUS
Qty: 2 ML | Refills: 1 | Status: SHIPPED | OUTPATIENT
Start: 2025-07-09

## 2025-07-15 ENCOUNTER — HOSPITAL ENCOUNTER (OUTPATIENT)
Dept: PHYSICAL THERAPY | Age: 70
Setting detail: THERAPIES SERIES
Discharge: HOME | End: 2025-07-15
Payer: MEDICARE

## 2025-07-15 PROCEDURE — 97530 THERAPEUTIC ACTIVITIES: CPT | Performed by: SPECIALIST

## 2025-07-15 PROCEDURE — 97140 MANUAL THERAPY 1/> REGIONS: CPT | Performed by: SPECIALIST

## 2025-07-15 NOTE — FLOWSHEET NOTE
Fever x this afternoon, went up to 104 this evening, tylenol last given 530pm
manual lymphatic drainage, manual traction) for the purpose of modulating pain, promoting relaxation,  increasing ROM, reducing/eliminating soft tissue swelling/inflammation/restriction, improving soft tissue extensibility and allowing for proper ROM for normal function with self care, mobility, lifting and ambulation    GOALS     Patient stated goal: walk better, reduce pain  Status: [] Progressing: [] Met: [x] Not Met: [] Adjusted    Therapist goals for Patient:   Short Term Goals: To be achieved in: 2 weeks  Independent in HEP and progression per patient tolerance, in order to progress toward full function and prevent re-injury.    Status: [x] Progressing: [] Met: [] Not Met: [] Adjusted  Patient will have a decrease in pain to 4/10 at worst to help facilitate improvement in movement, function, and ADLs as indicated by functional deficits.   Status: [x] Progressing: [] Met: [] Not Met: [] Adjusted  Pt will demonstrate independence with skin care.     Status: [x] Progressing: [] Met: [] Not Met: [] Adjusted    Long Term Goals: To be achieved in: 6 weeks  Disability index score of 40% or less for the LLIS to assist with reaching prior level of function with activities such as ADLs, IADLs, and work related activities.  [x] Progressing: [] Met: [] Not Met: [] Adjusted  Pt will report ability to sleep 7/7 nights/wk without waking up due to B LE pain.      Status: [x] Progressing: [] Met: [] Not Met: [] Adjusted  Decrease total girth of L LE by  additional 15.0 cm compared to 6/12 measurements so that pt can return to functional activities including prolonged walking and standing without increased symptoms or restriction.    Status: [] Progressing: [x] Met: [] Not Met: [x] Adjusted  Decrease total girth of L LE by  additional 15.0 cm compared to 6/12 measurements so that pt can return to functional activities including prolonged walking and standing without increased symptoms or restriction.    Status: [] Progressing:

## 2025-07-16 ENCOUNTER — HOSPITAL ENCOUNTER (OUTPATIENT)
Dept: PHYSICAL THERAPY | Age: 70
Setting detail: THERAPIES SERIES
Discharge: HOME | End: 2025-07-16
Payer: MEDICARE

## 2025-07-16 PROCEDURE — 97140 MANUAL THERAPY 1/> REGIONS: CPT

## 2025-07-16 PROCEDURE — 97530 THERAPEUTIC ACTIVITIES: CPT

## 2025-07-16 NOTE — FLOWSHEET NOTE
requires intervention due to being higher risk   Time Up and Go (TUG):   Not Assessed       Exercises/Interventions     Therapeutic Ex (17797)  Resistance Sets/time Reps Notes/Cues/Progressions   Long sit ankle pump   Long sit SLR  Long sit hip abd   LAQ   Seated march                                   [facility] re-education (21224)                            Therapeutic Activity (95382)       Measures taken for POC       Education provided as outlined below; added stockinette and mobiderm inside of the below the knee Circaids after MLD today       7/16 donned compressive undersocks, circaid reduction kit to lower leg, and added mobiderm large block foam - medial R lower leg distally, posterior R lower leg proximally   Demonstrates how to don compressive undersock and use compression donning gloves. PT donned L LE sock and pt's  donned R LE sock     Discussed basic pump vs advanced pump. Advised to trial lymphapants and if she doesn't like them she does not need to get them   X 15 min                 Manual Intervention (25350) Time: 45 min   MLD B LE - see chart below              Manual Lymph Drainage (MLD):  MLD to B LE, clearing along alternate pathway of ipsilateral IA to ipsilateral axillary lymph nodes    Clear Nodes 10x each   Neck x   [address] x   Axilla x   Abdomen x   Groin x   Popliteal x2 x   Clear Alternate Pathway 10x each   Re-clear alternate pathway   x5 each position x   Location B IA       Fluid Mobilization 10x each   Re-clear alternate pathway   x5 each position x   Shoulder bracing x   Location B LE       Protein Resorption 10x each   Location B medial thighs and ankles        Clear Foot/Ankle or Hand 10x each   Achilles x   Bilateral malleolus x   Fan the cards    Clear dorsum x   Clear through web space    Clear toes/fingers    Fluid mobilization x   Re-clear all positions  X5 each x     Modalities:    No modalities applied this session    Education/Home Exercise Program:   7/15: patient

## 2025-07-21 ENCOUNTER — PATIENT MESSAGE (OUTPATIENT)
Dept: INTERNAL MEDICINE CLINIC | Age: 70
End: 2025-07-21

## 2025-07-21 DIAGNOSIS — E11.65 UNCONTROLLED TYPE 2 DIABETES MELLITUS WITH HYPERGLYCEMIA (HCC): Primary | ICD-10-CM

## 2025-07-21 NOTE — TELEPHONE ENCOUNTER
Per patient, [employer] needed clarification on Mounjaro    Medication:   Requested Prescriptions     Pending Prescriptions Disp Refills    Tirzepatide (MOUNJARO) 2.5 MG/0.5ML SOAJ pen 2 mL 0     Sig: Inject 2.5 mg into the skin every 7 days       Last Filled:      Patient Phone Number: [phone] (home)     Last appt: 5/2/2025   Next appt: 8/4/2025    Last Labs DM:   Lab Results   Component Value Date/Time    LABA1C 7.5 04/17/2025 06:47 AM

## 2025-07-22 ENCOUNTER — HOSPITAL ENCOUNTER (OUTPATIENT)
Dept: PHYSICAL THERAPY | Age: 70
Setting detail: THERAPIES SERIES
Discharge: HOME | End: 2025-07-22
Payer: MEDICARE

## 2025-07-22 PROCEDURE — 97140 MANUAL THERAPY 1/> REGIONS: CPT | Performed by: SPECIALIST

## 2025-07-22 NOTE — FLOWSHEET NOTE
[facility] - Outpatient Rehabilitation and Therapy: [facility], [address], [place] [address] office: [phone] fax: [phone]         Physical Therapy: TREATMENT/PROGRESS NOTE   Patient: Pia El (69 y.o. female)   Examination Date: 2025   :  1955 MRN: [id number]   Visit #:     Insurance Allowable Auth Needed   12 visits approved;     Treatment dx code(s): I89.0  Approval dates: 25 TO 25  Approved visits: 10 VISITS   Approved codes: NOT SPECIFIC   Codes that DO NOT require auth: NO  Denied codes: NO  Did auth pend? YES  Auth#: [id number]    [x]Yes - optum  []No    Insurance: Payor: [facility] MEDICARE / Plan: [facility] MEDICARE COMPLETE / Product Type: *No Product type* /   Insurance ID: [id number] - (Medicare Managed)  Secondary Insurance (if applicable):    Treatment Diagnosis:     ICD-10-CM    1. Lymphedema  I89.0       2. Risk for falls  Z91.81       3. Requires assistance with activities of daily living (ADL)  Z74.1          Medical Diagnosis:  Bilateral leg edema [R60.0]  Lymphedema [I89.0]   Referring Physician: Meena Castelan MD  PCP: Meena Castelan MD     Plan of care signed (Y/N): Y    Date of Patient follow up with Physician:      Plan of Care Report: NO  POC update due: (10 visits /OR AUTH LIMITS, whichever is less) 8/15/25                                             Medical History:  Comorbidities:  Diabetes (Type I or II)  Hypertension  Osteoarthritis  Anxiety  Depression  Other: asthma, SOB with prolonged walking  Relevant Medical History:                                          Precautions/ Contra-indications:           Latex allergy:  NO  Pacemaker:    NO  Contraindications for Manipulation: None  Date of Surgery:   Other:    Red Flags:  None    Suicide Screening:   The patient did not verbalize a primary behavioral concern, suicidal ideation, suicidal intent, or demonstrate suicidal behaviors.    Preferred Language for Healthcare:

## 2025-07-23 ENCOUNTER — HOSPITAL ENCOUNTER (OUTPATIENT)
Dept: PHYSICAL THERAPY | Age: 70
Setting detail: THERAPIES SERIES
Discharge: HOME | End: 2025-07-23
Payer: MEDICARE

## 2025-07-23 PROCEDURE — 97140 MANUAL THERAPY 1/> REGIONS: CPT

## 2025-07-23 PROCEDURE — 97530 THERAPEUTIC ACTIVITIES: CPT

## 2025-07-23 NOTE — FLOWSHEET NOTE
[facility] - Outpatient Rehabilitation and Therapy: [facility], [address], [place] [address] office: [phone] fax: [phone]         Physical Therapy: TREATMENT/PROGRESS NOTE   Patient: Pia El (69 y.o. female)   Examination Date: 2025   :  1955 MRN: [id number]   Visit #: 15 /18  *REQUEST ADDITIONAL VISITS NV  Insurance Allowable Auth Needed   12 visits approved;     Treatment dx code(s): I89.0  Approval dates: 25 TO 25  Approved visits: 10 VISITS   Approved codes: NOT SPECIFIC   Codes that DO NOT require auth: NO  Denied codes: NO  Did auth pend? YES  Auth#: [id number]    [x]Yes - optum  []No    Insurance: Payor: [facility] MEDICARE / Plan: [facility] MEDICARE COMPLETE / Product Type: *No Product type* /   Insurance ID: [id number] - (Medicare Managed)  Secondary Insurance (if applicable):    Treatment Diagnosis:     ICD-10-CM    1. Lymphedema  I89.0       2. Risk for falls  Z91.81       3. Requires assistance with activities of daily living (ADL)  Z74.1          Medical Diagnosis:  Bilateral leg edema [R60.0]  Lymphedema [I89.0]   Referring Physician: Meena Castelan MD  PCP: Meena Castelan MD     Plan of care signed (Y/N): Y    Date of Patient follow up with Physician:      Plan of Care Report: NO  POC update due: (10 visits /OR AUTH LIMITS, whichever is less) 8/15/25                                             Medical History:  Comorbidities:  Diabetes (Type I or II)  Hypertension  Osteoarthritis  Anxiety  Depression  Other: asthma, SOB with prolonged walking  Relevant Medical History:                                          Precautions/ Contra-indications:           Latex allergy:  NO  Pacemaker:    NO  Contraindications for Manipulation: None  Date of Surgery:   Other:    Red Flags:  None    Suicide Screening:   The patient did not verbalize a primary behavioral concern, suicidal ideation, suicidal intent, or demonstrate suicidal behaviors.    Preferred Language for

## 2025-07-30 ENCOUNTER — HOSPITAL ENCOUNTER (OUTPATIENT)
Dept: PHYSICAL THERAPY | Age: 70
Setting detail: THERAPIES SERIES
Discharge: HOME | End: 2025-07-30
Payer: MEDICARE

## 2025-07-30 PROCEDURE — 97530 THERAPEUTIC ACTIVITIES: CPT

## 2025-07-30 PROCEDURE — 97140 MANUAL THERAPY 1/> REGIONS: CPT

## 2025-07-30 NOTE — PLAN OF CARE
not think there is an infection, resume compression and pump. If pt is given an antibiotic, hold compression and pump for 24 hours and if symptoms improve, resume compression and exercise.    PCP office contacted PT following pt's visit - pt scheduled appointment for tomorrow morning    5/16/25 Pt educated on anatomy of lymphatic system and diagnosis and prognosis of lymphedema. Discussed PT POC.   Discussed skin care and need for daily lotion - eucerine, vaseline, aquafor   Educated on lymphedema pump - has a lymphapress. Discussed need for lymphapant for fluid mobilization to axillary lymph nodes. Pt is getting training soon and will let PT know if they are leg sleeves only or pants  Discussed importance of elevating legs - avoid sitting with legs in dependent position   Discussed need for compression - day and night time. Wear circaids during the day, use pump, don night compression. Pt provided verbal consent to send PT notes and insurance information to [facility] to obtain compression garments.      Pt presents with B LE lymphedema. Pt requires compression garments for B LE - toes to thigh, to reduce limb volume, prevent progression of lymphedema, and reduce risk for wounds and infection. Pt will require lifetime compression, but garments being ordered will be worn for 6 months. Pt requires garments listed below:    Circaid full leg reduction kit with lobe kit - 1 each leg  Circaid ankle foot wrap - 1 each leg  Circaid toe caps - 1 each leg   [employer] fit full leg - 1 each leg        ASSESSMENT     Today's Assessment: See above    Medical Necessity Documentation:  I certify that this patient meets the below criteria necessary for medical necessity for care and/or justification of therapy services:  The patient has functional impairments and/or activity limitations and would benefit from continued outpatient therapy services to address the deficits outlined in the patients goals    Prognosis for

## 2025-08-04 ENCOUNTER — OFFICE VISIT (OUTPATIENT)
Dept: ENDOCRINOLOGY | Age: 70
End: 2025-08-04
Payer: MEDICARE

## 2025-08-04 VITALS
SYSTOLIC BLOOD PRESSURE: 137 MMHG | DIASTOLIC BLOOD PRESSURE: 78 MMHG | OXYGEN SATURATION: 96 % | HEART RATE: 80 BPM | WEIGHT: 293 LBS | BODY MASS INDEX: 51.53 KG/M2

## 2025-08-04 DIAGNOSIS — E11.65 UNCONTROLLED TYPE 2 DIABETES MELLITUS WITH HYPERGLYCEMIA (HCC): ICD-10-CM

## 2025-08-04 DIAGNOSIS — G62.9 NEUROPATHY: Primary | ICD-10-CM

## 2025-08-04 LAB — HBA1C MFR BLD: 9 %

## 2025-08-04 PROCEDURE — 1159F MED LIST DOCD IN RCRD: CPT | Performed by: INTERNAL MEDICINE

## 2025-08-04 PROCEDURE — 1123F ACP DISCUSS/DSCN MKR DOCD: CPT | Performed by: INTERNAL MEDICINE

## 2025-08-04 PROCEDURE — 3075F SYST BP GE 130 - 139MM HG: CPT | Performed by: INTERNAL MEDICINE

## 2025-08-04 PROCEDURE — G2211 COMPLEX E/M VISIT ADD ON: HCPCS | Performed by: INTERNAL MEDICINE

## 2025-08-04 PROCEDURE — 83036 HEMOGLOBIN GLYCOSYLATED A1C: CPT | Performed by: INTERNAL MEDICINE

## 2025-08-04 PROCEDURE — 3051F HG A1C>EQUAL 7.0%<8.0%: CPT | Performed by: INTERNAL MEDICINE

## 2025-08-04 PROCEDURE — 2022F DILAT RTA XM EVC RTNOPTHY: CPT | Performed by: INTERNAL MEDICINE

## 2025-08-04 PROCEDURE — 1036F TOBACCO NON-USER: CPT | Performed by: INTERNAL MEDICINE

## 2025-08-04 PROCEDURE — G8399 PT W/DXA RESULTS DOCUMENT: HCPCS | Performed by: INTERNAL MEDICINE

## 2025-08-04 PROCEDURE — G8427 DOCREV CUR MEDS BY ELIG CLIN: HCPCS | Performed by: INTERNAL MEDICINE

## 2025-08-04 PROCEDURE — 99214 OFFICE O/P EST MOD 30 MIN: CPT | Performed by: INTERNAL MEDICINE

## 2025-08-04 PROCEDURE — G8417 CALC BMI ABV UP PARAM F/U: HCPCS | Performed by: INTERNAL MEDICINE

## 2025-08-04 PROCEDURE — 1090F PRES/ABSN URINE INCON ASSESS: CPT | Performed by: INTERNAL MEDICINE

## 2025-08-04 PROCEDURE — 3017F COLORECTAL CA SCREEN DOC REV: CPT | Performed by: INTERNAL MEDICINE

## 2025-08-04 PROCEDURE — 3078F DIAST BP <80 MM HG: CPT | Performed by: INTERNAL MEDICINE

## 2025-08-04 RX ORDER — BLOOD-GLUCOSE SENSOR
1 EACH MISCELLANEOUS
Qty: 6 EACH | Refills: 0 | Status: SHIPPED | OUTPATIENT
Start: 2025-08-04

## 2025-08-05 ENCOUNTER — PATIENT MESSAGE (OUTPATIENT)
Dept: ENDOCRINOLOGY | Age: 70
End: 2025-08-05

## 2025-08-13 ENCOUNTER — APPOINTMENT (OUTPATIENT)
Dept: PHYSICAL THERAPY | Age: 70
End: 2025-08-13
Payer: MEDICARE

## 2025-08-19 ENCOUNTER — HOSPITAL ENCOUNTER (OUTPATIENT)
Dept: PHYSICAL THERAPY | Age: 70
Setting detail: THERAPIES SERIES
Discharge: HOME | End: 2025-08-19
Payer: MEDICARE

## 2025-08-19 PROCEDURE — 97530 THERAPEUTIC ACTIVITIES: CPT | Performed by: SPECIALIST

## 2025-08-19 PROCEDURE — 97140 MANUAL THERAPY 1/> REGIONS: CPT | Performed by: SPECIALIST

## 2025-08-26 ENCOUNTER — HOSPITAL ENCOUNTER (OUTPATIENT)
Dept: PHYSICAL THERAPY | Age: 70
Setting detail: THERAPIES SERIES
Discharge: HOME | End: 2025-08-26
Payer: MEDICARE

## 2025-08-26 PROCEDURE — 97140 MANUAL THERAPY 1/> REGIONS: CPT | Performed by: SPECIALIST

## 2025-08-26 PROCEDURE — 97530 THERAPEUTIC ACTIVITIES: CPT | Performed by: SPECIALIST

## 2025-09-02 ENCOUNTER — OFFICE VISIT (OUTPATIENT)
Dept: INTERNAL MEDICINE CLINIC | Age: 70
End: 2025-09-02
Payer: MEDICARE

## 2025-09-02 VITALS
SYSTOLIC BLOOD PRESSURE: 130 MMHG | WEIGHT: 293 LBS | OXYGEN SATURATION: 98 % | DIASTOLIC BLOOD PRESSURE: 100 MMHG | BODY MASS INDEX: 51.15 KG/M2 | HEART RATE: 71 BPM | TEMPERATURE: 99.1 F

## 2025-09-02 DIAGNOSIS — L03.90 CELLULITIS, UNSPECIFIED CELLULITIS SITE: ICD-10-CM

## 2025-09-02 DIAGNOSIS — E11.59 TYPE 2 DIABETES MELLITUS WITH OTHER CIRCULATORY COMPLICATION, WITH LONG-TERM CURRENT USE OF INSULIN (HCC): ICD-10-CM

## 2025-09-02 DIAGNOSIS — I89.0 LYMPHEDEMA: ICD-10-CM

## 2025-09-02 DIAGNOSIS — R06.02 SOB (SHORTNESS OF BREATH): ICD-10-CM

## 2025-09-02 DIAGNOSIS — R60.0 BILATERAL LEG EDEMA: ICD-10-CM

## 2025-09-02 DIAGNOSIS — R06.02 SHORTNESS OF BREATH: ICD-10-CM

## 2025-09-02 DIAGNOSIS — R05.1 ACUTE COUGH: Primary | ICD-10-CM

## 2025-09-02 DIAGNOSIS — Z79.4 TYPE 2 DIABETES MELLITUS WITH OTHER CIRCULATORY COMPLICATION, WITH LONG-TERM CURRENT USE OF INSULIN (HCC): ICD-10-CM

## 2025-09-02 PROCEDURE — G8427 DOCREV CUR MEDS BY ELIG CLIN: HCPCS | Performed by: STUDENT IN AN ORGANIZED HEALTH CARE EDUCATION/TRAINING PROGRAM

## 2025-09-02 PROCEDURE — 1159F MED LIST DOCD IN RCRD: CPT | Performed by: STUDENT IN AN ORGANIZED HEALTH CARE EDUCATION/TRAINING PROGRAM

## 2025-09-02 PROCEDURE — 99214 OFFICE O/P EST MOD 30 MIN: CPT | Performed by: STUDENT IN AN ORGANIZED HEALTH CARE EDUCATION/TRAINING PROGRAM

## 2025-09-02 PROCEDURE — 3075F SYST BP GE 130 - 139MM HG: CPT | Performed by: STUDENT IN AN ORGANIZED HEALTH CARE EDUCATION/TRAINING PROGRAM

## 2025-09-02 PROCEDURE — 1090F PRES/ABSN URINE INCON ASSESS: CPT | Performed by: STUDENT IN AN ORGANIZED HEALTH CARE EDUCATION/TRAINING PROGRAM

## 2025-09-02 PROCEDURE — G8417 CALC BMI ABV UP PARAM F/U: HCPCS | Performed by: STUDENT IN AN ORGANIZED HEALTH CARE EDUCATION/TRAINING PROGRAM

## 2025-09-02 PROCEDURE — 1036F TOBACCO NON-USER: CPT | Performed by: STUDENT IN AN ORGANIZED HEALTH CARE EDUCATION/TRAINING PROGRAM

## 2025-09-02 PROCEDURE — 1123F ACP DISCUSS/DSCN MKR DOCD: CPT | Performed by: STUDENT IN AN ORGANIZED HEALTH CARE EDUCATION/TRAINING PROGRAM

## 2025-09-02 PROCEDURE — G8399 PT W/DXA RESULTS DOCUMENT: HCPCS | Performed by: STUDENT IN AN ORGANIZED HEALTH CARE EDUCATION/TRAINING PROGRAM

## 2025-09-02 PROCEDURE — 3052F HG A1C>EQUAL 8.0%<EQUAL 9.0%: CPT | Performed by: STUDENT IN AN ORGANIZED HEALTH CARE EDUCATION/TRAINING PROGRAM

## 2025-09-02 PROCEDURE — 3017F COLORECTAL CA SCREEN DOC REV: CPT | Performed by: STUDENT IN AN ORGANIZED HEALTH CARE EDUCATION/TRAINING PROGRAM

## 2025-09-02 PROCEDURE — 2022F DILAT RTA XM EVC RTNOPTHY: CPT | Performed by: STUDENT IN AN ORGANIZED HEALTH CARE EDUCATION/TRAINING PROGRAM

## 2025-09-02 PROCEDURE — 3080F DIAST BP >= 90 MM HG: CPT | Performed by: STUDENT IN AN ORGANIZED HEALTH CARE EDUCATION/TRAINING PROGRAM

## 2025-09-02 RX ORDER — AZITHROMYCIN 250 MG/1
TABLET, FILM COATED ORAL
Qty: 6 TABLET | Refills: 0 | Status: SHIPPED | OUTPATIENT
Start: 2025-09-02 | End: 2025-09-12

## 2025-09-03 ENCOUNTER — HOSPITAL ENCOUNTER (OUTPATIENT)
Dept: PHYSICAL THERAPY | Age: 70
Setting detail: THERAPIES SERIES
Discharge: HOME | End: 2025-09-03
Payer: MEDICARE

## 2025-09-03 PROCEDURE — 97530 THERAPEUTIC ACTIVITIES: CPT

## 2025-09-03 PROCEDURE — 97140 MANUAL THERAPY 1/> REGIONS: CPT

## 2025-09-04 ENCOUNTER — TELEPHONE (OUTPATIENT)
Dept: INTERNAL MEDICINE CLINIC | Age: 70
End: 2025-09-04

## (undated) DEVICE — CANNULA SAMP CO2 AD GRN 7FT CO2 AND 7FT O2 TBNG UNIV CONN

## (undated) DEVICE — ERBE NESSY®PLATE 170 SPLIT; 168CM²; CABLE 3M: Brand: ERBE

## (undated) DEVICE — SNARE ENDOSCP ROUNDED 2.4X20X240 MM STIFF CAPTIVATOR II

## (undated) DEVICE — SNARE COLD DIAMOND 10MM THIN

## (undated) DEVICE — SNARE ENDOSCP POLYP SM 2.4 MM 195 CM 13 MM 2.8 MM CAPTIVATOR

## (undated) DEVICE — TRAP SPEC RETRV CLR PLAS POLYP IN LN SUCT QUIK CTCH